# Patient Record
Sex: FEMALE | Race: WHITE | Employment: FULL TIME | URBAN - METROPOLITAN AREA
[De-identification: names, ages, dates, MRNs, and addresses within clinical notes are randomized per-mention and may not be internally consistent; named-entity substitution may affect disease eponyms.]

---

## 2017-01-06 ENCOUNTER — OFFICE VISIT (OUTPATIENT)
Dept: FAMILY MEDICINE CLINIC | Facility: CLINIC | Age: 57
End: 2017-01-06

## 2017-01-06 VITALS
TEMPERATURE: 97 F | SYSTOLIC BLOOD PRESSURE: 104 MMHG | HEART RATE: 85 BPM | HEIGHT: 67 IN | WEIGHT: 218 LBS | DIASTOLIC BLOOD PRESSURE: 67 MMHG | BODY MASS INDEX: 34.21 KG/M2

## 2017-01-06 DIAGNOSIS — G89.29 CHRONIC MIDLINE LOW BACK PAIN WITH LEFT-SIDED SCIATICA: ICD-10-CM

## 2017-01-06 DIAGNOSIS — M54.42 CHRONIC MIDLINE LOW BACK PAIN WITH LEFT-SIDED SCIATICA: ICD-10-CM

## 2017-01-06 DIAGNOSIS — M48.061 FORAMINAL STENOSIS OF LUMBAR REGION: Primary | ICD-10-CM

## 2017-01-06 PROCEDURE — 99212 OFFICE O/P EST SF 10 MIN: CPT | Performed by: FAMILY MEDICINE

## 2017-01-06 PROCEDURE — 99214 OFFICE O/P EST MOD 30 MIN: CPT | Performed by: FAMILY MEDICINE

## 2017-01-10 ENCOUNTER — APPOINTMENT (OUTPATIENT)
Dept: PHYSICAL THERAPY | Age: 57
End: 2017-01-10
Attending: FAMILY MEDICINE
Payer: COMMERCIAL

## 2017-01-12 ENCOUNTER — APPOINTMENT (OUTPATIENT)
Dept: PHYSICAL THERAPY | Age: 57
End: 2017-01-12
Attending: FAMILY MEDICINE
Payer: COMMERCIAL

## 2017-01-13 ENCOUNTER — OFFICE VISIT (OUTPATIENT)
Dept: PHYSICAL THERAPY | Facility: HOSPITAL | Age: 57
End: 2017-01-13
Attending: FAMILY MEDICINE
Payer: COMMERCIAL

## 2017-01-13 DIAGNOSIS — M54.42 CHRONIC MIDLINE LOW BACK PAIN WITH LEFT-SIDED SCIATICA: ICD-10-CM

## 2017-01-13 DIAGNOSIS — M48.061 FORAMINAL STENOSIS OF LUMBAR REGION: Primary | ICD-10-CM

## 2017-01-13 DIAGNOSIS — G89.29 CHRONIC MIDLINE LOW BACK PAIN WITH LEFT-SIDED SCIATICA: ICD-10-CM

## 2017-01-13 PROCEDURE — 97162 PT EVAL MOD COMPLEX 30 MIN: CPT | Performed by: PHYSICAL THERAPIST

## 2017-01-13 PROCEDURE — 97110 THERAPEUTIC EXERCISES: CPT | Performed by: PHYSICAL THERAPIST

## 2017-01-13 NOTE — PROGRESS NOTES
LUMBAR SPINE EVALUATION:   Referring Physician: Dr. Cara Mayberry  Diagnosis: Foraminal stenosis of lumbar region (M99.83)  Chronic midline low back pain with left-sided sciatica (M54.42,G89.29)     Date of Service: 1/13/2017  Visit # 1  Scheduled Visits 6  In 1 UE support  Standing: laterally shifted to the L    ROM:     Trunk           Flexion    75%              Extension 25%   R Sidebend 75%   L Sidebend 75%   R Rotation 100%*   L Rotation 100%   R Sideglide 75%   L Sideglide 75%     Repeated Motion Testing: treatment options and has agreed to actively participate in planning and for this course of care. Thank you for your referral. Please co-sign or sign and return this letter via fax as soon as possible to [phone].  If you have any questions, please c

## 2017-01-16 ENCOUNTER — OFFICE VISIT (OUTPATIENT)
Dept: PHYSICAL THERAPY | Facility: HOSPITAL | Age: 57
End: 2017-01-16
Attending: FAMILY MEDICINE
Payer: COMMERCIAL

## 2017-01-16 ENCOUNTER — APPOINTMENT (OUTPATIENT)
Dept: PHYSICAL THERAPY | Age: 57
End: 2017-01-16
Attending: FAMILY MEDICINE
Payer: COMMERCIAL

## 2017-01-16 PROCEDURE — 97140 MANUAL THERAPY 1/> REGIONS: CPT | Performed by: PHYSICAL THERAPIST

## 2017-01-16 PROCEDURE — 97110 THERAPEUTIC EXERCISES: CPT | Performed by: PHYSICAL THERAPIST

## 2017-01-16 NOTE — PROGRESS NOTES
Foraminal Stenosis of lumbar region  Authorized # of Visits:  9         Next MD visit: none scheduled  Fall Risk: standard         Precautions: n/a           Medication Changes since last visit?: No  Subjective: Pt feels she gets limited relief from HEP, h to be able to walk >20 min in a grocery store  5.  Pt to exhibit increase in lumbar AROM to min loss in all planes for ease of mobility/transfers in order for patient to get into/out of car    Plan: Pt to add PKB, hip ext and hip IR stretching in prone 2x/d

## 2017-01-18 ENCOUNTER — APPOINTMENT (OUTPATIENT)
Dept: PHYSICAL THERAPY | Age: 57
End: 2017-01-18
Attending: FAMILY MEDICINE
Payer: COMMERCIAL

## 2017-01-19 ENCOUNTER — OFFICE VISIT (OUTPATIENT)
Dept: PHYSICAL THERAPY | Facility: HOSPITAL | Age: 57
End: 2017-01-19
Attending: FAMILY MEDICINE
Payer: COMMERCIAL

## 2017-01-19 PROCEDURE — 97110 THERAPEUTIC EXERCISES: CPT

## 2017-01-19 PROCEDURE — 97140 MANUAL THERAPY 1/> REGIONS: CPT

## 2017-01-19 NOTE — PROGRESS NOTES
Foraminal Stenosis of lumbar region  Authorized # of Visits:  3/9         Next MD visit: none scheduled  Fall Risk: standard         Precautions: n/a           Medication Changes since last visit?: No  Subjective: Pt states that she has been in pain since 1. Pt to be independent in their home exercise program, its' progression and management of their symptoms  2. Pt to demo at least 5/5 B LE strength in order to get into/out of car.   3. Pt will exhibit proper body mechanics with floor to waist lifting of

## 2017-01-21 ENCOUNTER — APPOINTMENT (OUTPATIENT)
Dept: PHYSICAL THERAPY | Facility: HOSPITAL | Age: 57
End: 2017-01-21
Attending: FAMILY MEDICINE
Payer: COMMERCIAL

## 2017-01-24 ENCOUNTER — OFFICE VISIT (OUTPATIENT)
Dept: PHYSICAL THERAPY | Facility: HOSPITAL | Age: 57
End: 2017-01-24
Attending: FAMILY MEDICINE
Payer: COMMERCIAL

## 2017-01-24 PROCEDURE — 97110 THERAPEUTIC EXERCISES: CPT

## 2017-01-24 NOTE — PROGRESS NOTES
Foraminal Stenosis of lumbar region  Authorized # of Visits:  4/9         Next MD visit: none scheduled  Fall Risk: standard         Precautions: n/a           Medication Changes since last visit?: No  Subjective: Pt states that she was feeling really good mechanics for golfer's and squat lifts x 6 min         Standing hip ext with red TB x 15 r/l         Mini squats with abd bracing x 15            Assessment: Pt reporting relief with flexion based program.  Pt has decreased postural awareness and requires

## 2017-01-25 ENCOUNTER — APPOINTMENT (OUTPATIENT)
Dept: PHYSICAL THERAPY | Age: 57
End: 2017-01-25
Attending: FAMILY MEDICINE
Payer: COMMERCIAL

## 2017-01-26 ENCOUNTER — OFFICE VISIT (OUTPATIENT)
Dept: PHYSICAL THERAPY | Facility: HOSPITAL | Age: 57
End: 2017-01-26
Attending: FAMILY MEDICINE
Payer: COMMERCIAL

## 2017-01-26 PROCEDURE — 97110 THERAPEUTIC EXERCISES: CPT | Performed by: PHYSICAL THERAPIST

## 2017-01-26 NOTE — PROGRESS NOTES
Foraminal Stenosis of lumbar region  Authorized # of Visits:  5/9         Next MD visit: none scheduled  Fall Risk: standard         Precautions: n/a           Medication Changes since last visit?: No  Subjective: Pt states that she continues to be in pain home exercise program, its' progression and management of their symptoms  2. Pt to demo at least 5/5 B LE strength in order to get into/out of car.   3. Pt will exhibit proper body mechanics with floor to waist lifting of 10# in order to lift laundry basket

## 2017-01-27 ENCOUNTER — APPOINTMENT (OUTPATIENT)
Dept: PHYSICAL THERAPY | Age: 57
End: 2017-01-27
Attending: FAMILY MEDICINE
Payer: COMMERCIAL

## 2017-01-31 ENCOUNTER — OFFICE VISIT (OUTPATIENT)
Dept: PHYSICAL THERAPY | Facility: HOSPITAL | Age: 57
End: 2017-01-31
Attending: FAMILY MEDICINE
Payer: COMMERCIAL

## 2017-01-31 PROCEDURE — 97110 THERAPEUTIC EXERCISES: CPT | Performed by: PHYSICAL THERAPIST

## 2017-01-31 NOTE — PROGRESS NOTES
Foraminal Stenosis of lumbar region  Authorized # of Visits:  6/9         Next MD visit: none scheduled  Fall Risk: standard         Precautions: n/a           Medication Changes since last visit?: No  Subjective: Pt states that she was in a lot pain this squats x20         Assessment: Added B LE strengthening exercises        Plan: Pt to continue with flexion based program and core strengthening        Charges: 3 TE,      Total Timed Treatment: 40 min  Total Treatment Time: 40 min

## 2017-02-02 ENCOUNTER — OFFICE VISIT (OUTPATIENT)
Dept: PHYSICAL THERAPY | Facility: HOSPITAL | Age: 57
End: 2017-02-02
Attending: FAMILY MEDICINE
Payer: COMMERCIAL

## 2017-02-02 PROCEDURE — 97110 THERAPEUTIC EXERCISES: CPT | Performed by: PHYSICAL THERAPIST

## 2017-02-02 NOTE — PROGRESS NOTES
Foraminal Stenosis of lumbar region  Authorized # of Visits:  7/9         Next MD visit: none scheduled  Fall Risk: standard         Precautions: n/a           Medication Changes since last visit?: No  Subjective: Pt states she was sore after last visit. with abd bracing x 15   Mini squats x20         Assessment: Performed ext based exercises after pt stood up she had step too gait and no change in symptoms.  . Recommended pt to make an appointment with the pain center        Plan: Reassessed pt        Meredith

## 2017-02-08 ENCOUNTER — OFFICE VISIT (OUTPATIENT)
Dept: PHYSICAL THERAPY | Facility: HOSPITAL | Age: 57
End: 2017-02-08
Attending: FAMILY MEDICINE
Payer: COMMERCIAL

## 2017-02-08 PROCEDURE — 97110 THERAPEUTIC EXERCISES: CPT

## 2017-02-08 NOTE — PROGRESS NOTES
Foraminal Stenosis of lumbar region  Authorized # of Visits:  8/9         Next MD visit: none scheduled  Fall Risk: standard         Precautions: n/a           Medication Changes since last visit?: No  Subjective: Pt states that she wants to keep it light. (partial)   2 x 10    Repeated seated hip flexion 2x10 - decrease ant thigh/groin pain with walking - better.  DKTC 10\" x 5-better Body mechanics for golfer's and squat lifts x 6 min  Hamstring stretch 30sec x3  Prone quads 30sec x3 Prone quad stretch 30 s

## 2017-02-14 NOTE — PROGRESS NOTES
Patient Name: Riley Schultz, : 6/15/1960, MRN: [id number]   Date:  2017  Referring Physician:  Trey Sterling    Diagnosis: Foraminal Stenosis of lumbar region    Discharge Summary    Pt has attended 8, cancelled 1, and no shown 0 visits in

## 2017-02-15 ENCOUNTER — APPOINTMENT (OUTPATIENT)
Dept: PHYSICAL THERAPY | Facility: HOSPITAL | Age: 57
End: 2017-02-15
Attending: FAMILY MEDICINE
Payer: COMMERCIAL

## 2017-02-16 ENCOUNTER — OFFICE VISIT (OUTPATIENT)
Dept: PAIN CLINIC | Facility: HOSPITAL | Age: 57
End: 2017-02-16
Attending: ANESTHESIOLOGY
Payer: COMMERCIAL

## 2017-02-16 VITALS
WEIGHT: 200 LBS | HEART RATE: 84 BPM | HEIGHT: 67 IN | RESPIRATION RATE: 18 BRPM | SYSTOLIC BLOOD PRESSURE: 148 MMHG | BODY MASS INDEX: 31.39 KG/M2 | DIASTOLIC BLOOD PRESSURE: 98 MMHG

## 2017-02-16 DIAGNOSIS — M48.061 FORAMINAL STENOSIS OF LUMBAR REGION: Primary | ICD-10-CM

## 2017-02-16 DIAGNOSIS — M51.16 LUMBAR DISC DISEASE WITH RADICULOPATHY: ICD-10-CM

## 2017-02-16 PROCEDURE — 99201 HC OUTPT EVAL AND MGNT NEW PT LEVEL 1: CPT

## 2017-02-16 NOTE — CHRONIC PAIN
Initial Consultation Note      HISTORY OF PRESENT ILLNESS:  Clay Perez is a 49year old old female referred to the pain clinic by Dr. Cara Mayberry for lower back pain. The pain radiates into the left lower extremity. No specific inciting event recalled.  No hypertension    • Pilonidal cyst    • Dermatological disorder      per [employer]:  \"removal of cyst under the chin\"   • Dermatological disorder      per [employer]:  \"removal of cyst under left arm\"   • Back problem        FAMILY HISTORY:  Family History Foot EHL 5/5 5/5   Gastrocnemius 5/5 5/5     SLR: positive on left, negative on right  SIJ tenderness negative  Дмитрий's test: negative  Joint Exam: Normal  TPs:  Absent within lumbo-sacral paraspinal muscles    Right Deep tendon reflexes: 2/4   Left Deep foraminal narrowing. L4-L5:   There is a broad left lateralized disc bulge. No significant central stenosis. There is facet arthropathy. There is moderate to severe left, and mild right foraminal narrowing. L5-S1:  Mild disc bulge.  No significant central paralysis, death. The patient desires the proposed neuraxial anesthetic as planned. Patient to return for follow up in 3 weeks, sooner if warranted.

## 2017-02-16 NOTE — PROGRESS NOTES
WORKS IN OFFICE  GOOD SUPPORT SYSTEM    02/16/17  PRESENTS AMBULATORY TO CPM;  NEW CONSULT C/O LLBP WITH LT THIGH RADICULAR PAIN;  MRI INDICATIONS:  Lower back pain that radiates down left leg for 1 year. No injury.  Sciatica despite greater than 6

## 2017-02-23 ENCOUNTER — TELEPHONE (OUTPATIENT)
Dept: FAMILY MEDICINE CLINIC | Facility: CLINIC | Age: 57
End: 2017-02-23

## 2017-02-23 NOTE — TELEPHONE ENCOUNTER
Per pt, she is having pain on her shoulder blade, chest congestion and sometime pt is catching her breath and don't know if she has URI or sinus. Pt would like to know if VS can see her today. No available appt to any drs.    Pt would like to know what to

## 2017-02-23 NOTE — TELEPHONE ENCOUNTER
Reason for Call/Chief Complaint: Patient states has been having neck pain that travels to shoulder blade and possible Upper Respiratory Infection.    Onset: Tuesday  Nursing Assessment/Associated Symptoms: States woke up on Tuesday with neck pain, shoulder

## 2017-02-24 ENCOUNTER — OFFICE VISIT (OUTPATIENT)
Dept: FAMILY MEDICINE CLINIC | Facility: CLINIC | Age: 57
End: 2017-02-24

## 2017-02-24 VITALS
HEIGHT: 67 IN | BODY MASS INDEX: 34.06 KG/M2 | DIASTOLIC BLOOD PRESSURE: 85 MMHG | SYSTOLIC BLOOD PRESSURE: 118 MMHG | HEART RATE: 77 BPM | TEMPERATURE: 97 F | WEIGHT: 217 LBS

## 2017-02-24 DIAGNOSIS — F43.9 EMOTIONAL STRESS REACTION: ICD-10-CM

## 2017-02-24 DIAGNOSIS — S46.819A TRAPEZIUS STRAIN, UNSPECIFIED LATERALITY, INITIAL ENCOUNTER: ICD-10-CM

## 2017-02-24 DIAGNOSIS — S16.1XXA NECK STRAIN, INITIAL ENCOUNTER: Primary | ICD-10-CM

## 2017-02-24 DIAGNOSIS — R07.89 CHEST WALL PAIN: ICD-10-CM

## 2017-02-24 PROCEDURE — 99212 OFFICE O/P EST SF 10 MIN: CPT | Performed by: FAMILY MEDICINE

## 2017-02-24 PROCEDURE — 99214 OFFICE O/P EST MOD 30 MIN: CPT | Performed by: FAMILY MEDICINE

## 2017-02-24 RX ORDER — METHOCARBAMOL 750 MG/1
750 TABLET, FILM COATED ORAL 3 TIMES DAILY
Qty: 40 TABLET | Refills: 0 | Status: SHIPPED | OUTPATIENT
Start: 2017-02-24 | End: 2017-03-06

## 2017-02-24 NOTE — PROGRESS NOTES
Patient ID: Brendan Villegas is a 49year old female. HPI  Patient presents with:  Back Pain: upper back with shouler and neck  Chest Pain    On 2/21/2017 she was getting ready for work and sat down.   She stood up and felt immediate pain in the trapezius bilaterally without step-off or crepitus. Neurological: She is alert and oriented to person, place, and time. She has normal reflexes. Skin: Skin is warm and dry. Psychiatric: Her mood appears anxious. Nursing note and vitals reviewed.     Blood pre understands and agrees to plan.            Trey Sterling, DO  2/24/2017

## 2017-03-08 ENCOUNTER — TELEPHONE (OUTPATIENT)
Dept: PAIN CLINIC | Facility: HOSPITAL | Age: 57
End: 2017-03-08

## 2017-03-09 ENCOUNTER — TELEPHONE (OUTPATIENT)
Dept: PAIN CLINIC | Facility: HOSPITAL | Age: 57
End: 2017-03-09

## 2017-03-09 NOTE — TELEPHONE ENCOUNTER
Pt was instructed to return for follow up. Does she have an upcoming appt?  If not please schedule one, pt has private insurance and requires a PA

## 2017-03-10 ENCOUNTER — OFFICE VISIT (OUTPATIENT)
Dept: PAIN CLINIC | Facility: HOSPITAL | Age: 57
End: 2017-03-10
Attending: NURSE PRACTITIONER
Payer: COMMERCIAL

## 2017-03-10 VITALS
HEIGHT: 67 IN | BODY MASS INDEX: 31.39 KG/M2 | SYSTOLIC BLOOD PRESSURE: 115 MMHG | HEART RATE: 77 BPM | WEIGHT: 200 LBS | DIASTOLIC BLOOD PRESSURE: 77 MMHG

## 2017-03-10 DIAGNOSIS — M54.42 CHRONIC MIDLINE LOW BACK PAIN WITH LEFT-SIDED SCIATICA: ICD-10-CM

## 2017-03-10 DIAGNOSIS — G89.29 CHRONIC MIDLINE LOW BACK PAIN WITH LEFT-SIDED SCIATICA: ICD-10-CM

## 2017-03-10 DIAGNOSIS — M48.07 SPINAL STENOSIS OF LUMBOSACRAL REGION: Primary | ICD-10-CM

## 2017-03-10 PROBLEM — M54.50 LUMBAGO: Status: ACTIVE | Noted: 2017-03-10

## 2017-03-10 PROBLEM — M48.00 SPINAL STENOSIS: Status: ACTIVE | Noted: 2017-03-10

## 2017-03-10 PROCEDURE — 99211 OFF/OP EST MAY X REQ PHY/QHP: CPT

## 2017-03-10 NOTE — CHRONIC PAIN
Follow-up Note    HISTORY OF PRESENT ILLNESS:  Esteban Benton is a 49year old old female, originally referred to the pain clinic by Michoacano Owens, returns to the clinic for follow up.   Patient reports COLLIN provided 100% relief of her low back pain and 50% reli essential hypertension    • Pilonidal cyst    • Dermatological disorder      per [employer]:  \"removal of cyst under the chin\"   • Dermatological disorder      per [employer]:  \"removal of cyst under left arm\"   • Back problem        SURGICAL HISTORY:      P RRR, no m/r/g  Pulmonary: CTAB   Abdomen: soft, non-tender  Gait: Normal;  cane user - No  Spine: Scoliosis    ROM:   LUMBAR SPINE    Degree Pain   Flexion 90 No   Extension 30 No                         MOTOR EXAMINATION:  LOWER EXTREMITY      LEFT RIGHT

## 2017-04-11 ENCOUNTER — HOSPITAL ENCOUNTER (OUTPATIENT)
Dept: GENERAL RADIOLOGY | Age: 57
Discharge: HOME | End: 2017-04-11
Attending: FAMILY MEDICINE
Payer: COMMERCIAL

## 2017-04-11 ENCOUNTER — OFFICE VISIT (OUTPATIENT)
Dept: FAMILY MEDICINE CLINIC | Facility: CLINIC | Age: 57
End: 2017-04-11

## 2017-04-11 VITALS
SYSTOLIC BLOOD PRESSURE: 124 MMHG | TEMPERATURE: 97 F | HEIGHT: 67 IN | DIASTOLIC BLOOD PRESSURE: 85 MMHG | HEART RATE: 89 BPM | WEIGHT: 215 LBS | BODY MASS INDEX: 33.74 KG/M2

## 2017-04-11 DIAGNOSIS — M16.12 PRIMARY OSTEOARTHRITIS OF LEFT HIP: ICD-10-CM

## 2017-04-11 DIAGNOSIS — M48.07 SPINAL STENOSIS OF LUMBOSACRAL REGION: ICD-10-CM

## 2017-04-11 DIAGNOSIS — R05.9 COUGH: ICD-10-CM

## 2017-04-11 DIAGNOSIS — M79.652 LEFT THIGH PAIN: ICD-10-CM

## 2017-04-11 DIAGNOSIS — R05.9 COUGH: Primary | ICD-10-CM

## 2017-04-11 PROCEDURE — 99214 OFFICE O/P EST MOD 30 MIN: CPT | Performed by: FAMILY MEDICINE

## 2017-04-11 PROCEDURE — 71020 XR CHEST PA + LAT CHEST (CPT=71020): CPT

## 2017-04-11 PROCEDURE — 99212 OFFICE O/P EST SF 10 MIN: CPT | Performed by: FAMILY MEDICINE

## 2017-04-11 RX ORDER — BENZONATATE 200 MG/1
200 CAPSULE ORAL 3 TIMES DAILY PRN
Qty: 30 CAPSULE | Refills: 0 | Status: SHIPPED | OUTPATIENT
Start: 2017-04-11 | End: 2017-04-21

## 2017-04-11 RX ORDER — AZITHROMYCIN 250 MG/1
TABLET, FILM COATED ORAL
Qty: 6 TABLET | Refills: 0 | Status: SHIPPED | OUTPATIENT
Start: 2017-04-11 | End: 2017-04-16

## 2017-04-11 NOTE — PROGRESS NOTES
Patient ID: Cindy Black is a 49year old female. HPI  Patient presents with:  Cough    She states for her back she had 2 epidural steroid injections. The last one was on March 20, 2017. She states she still gets pain in the left thigh.   She did ha laporoscopy    OTHER SURGICAL HISTORY      Comment amniocentesis    ELECTROCARDIOGRAM, COMPLETE  06-    Comment Scanned to Media Tab - Date of Service 06-            Current Outpatient Prescriptions:  Losartan Potassium-HCTZ 100-25 MG Oral Ta Tessalon Perles. She defers anything with codeine in it. We will go ahead and do a chest x-ray on her. Spinal stenosis of lumbosacral region  She had 2 epidural steroid injections.   I wonder if the pain is actually coming from the left hip we will go ah

## 2017-04-12 ENCOUNTER — TELEPHONE (OUTPATIENT)
Dept: FAMILY MEDICINE CLINIC | Facility: CLINIC | Age: 57
End: 2017-04-12

## 2017-04-12 NOTE — TELEPHONE ENCOUNTER
----- Message from Yanna Leal DO sent at 4/11/2017  5:40 PM CDT -----  Chest x-ray shows no pneumonia.

## 2017-04-19 ENCOUNTER — TELEPHONE (OUTPATIENT)
Dept: FAMILY MEDICINE CLINIC | Facility: CLINIC | Age: 57
End: 2017-04-19

## 2017-04-19 DIAGNOSIS — M79.652 LEFT THIGH PAIN: ICD-10-CM

## 2017-04-19 DIAGNOSIS — M16.10 ARTHRITIS OF HIP: ICD-10-CM

## 2017-04-19 DIAGNOSIS — M25.552 LEFT HIP PAIN: Primary | ICD-10-CM

## 2017-04-19 NOTE — TELEPHONE ENCOUNTER
Dr. Renie Closs,    Please enter referral for xr aspir/inj major joint w/ fluoro. Also patient requesting return to work letter, letter written please review. Thanks(patient will call us back with work fax num    Manage care pt would like you to follow up with her on daily basis until     Pt had a 10am appointment at [facility] for her xr aspir/inj major joint w/ fluoro LT (hip injection for her osteoarthritis) order entered by Dr. Renie Closs on 4/11/17. Approval for the procedure was not verified from the insurance company. I attempt to get prior authorization for patient today however that was not possible because the order needs a referral itself.

## 2017-04-19 NOTE — TELEPHONE ENCOUNTER
Please call patient asap, she is having a procedure in radiology in 10min.  Was told by her insur that they have no inform on this, she is asking was this approved

## 2017-04-19 NOTE — TELEPHONE ENCOUNTER
Spoke to pt, informed her that according to [employer], a referral is needed for the procedure. Dr. Shahnaz Márquez will sign the referral and send it to [facility] for approval, it will be sent to the insurance company, however, the procedure will have to be rescheduled. Pt is very frustrated but will await approval so that procedure can be rescheduled.

## 2017-04-19 NOTE — TELEPHONE ENCOUNTER
Spoke to pt, she is awaiting a hip injection under flouroscopy. Approval for the procedure was not verifed from [employer]. Spoke to the office, they are currently on the phone awaiting approval for the procedure. Called pt back, left VM that the process is ongoing, the office staff will call her when they have a response.

## 2017-04-21 ENCOUNTER — TELEPHONE (OUTPATIENT)
Dept: FAMILY MEDICINE CLINIC | Facility: CLINIC | Age: 57
End: 2017-04-21

## 2017-04-21 NOTE — TELEPHONE ENCOUNTER
Patient called this morning upset that she has not received daily calls as promised with updates on her request. Referral necessary for approval was coded under DME instead of the correct coding needed for the procedure. Spoke with [facility] (ext [phone]) and was informed that they have not processed request d/t not being placed urgently. Also, message requesting daily calls to patient was never routed to [facility]. Patient requires referral for procedure. Please contact patient with any updates/questions/concerns.

## 2017-04-21 NOTE — TELEPHONE ENCOUNTER
Spoke with patient informed ref was corrected and sent to [facility]. I did also follow up with [facility] to ensure it was rec.

## 2017-04-21 NOTE — TELEPHONE ENCOUNTER
Spoke with Altagracia Rhodes. Advised that I was unable to give her name of person who would follow-up with her regarding the status of this request. BRUNA stated she should call back on Monday to get contact information for person in managed care who could assist her.

## 2017-05-19 ENCOUNTER — HOSPITAL ENCOUNTER (OUTPATIENT)
Dept: GENERAL RADIOLOGY | Facility: HOSPITAL | Age: 57
Discharge: HOME | End: 2017-05-19
Attending: FAMILY MEDICINE
Payer: COMMERCIAL

## 2017-05-19 DIAGNOSIS — M16.10 ARTHRITIS OF HIP: ICD-10-CM

## 2017-05-19 DIAGNOSIS — M25.552 LEFT HIP PAIN: ICD-10-CM

## 2017-05-19 DIAGNOSIS — M79.652 LEFT THIGH PAIN: ICD-10-CM

## 2017-05-19 PROCEDURE — 20610 DRAIN/INJ JOINT/BURSA W/O US: CPT | Performed by: FAMILY MEDICINE

## 2017-05-19 PROCEDURE — 77002 NEEDLE LOCALIZATION BY XRAY: CPT

## 2017-05-19 RX ORDER — METHYLPREDNISOLONE ACETATE 80 MG/ML
80 INJECTION, SUSPENSION INTRA-ARTICULAR; INTRALESIONAL; INTRAMUSCULAR; SOFT TISSUE ONCE
Status: DISCONTINUED | OUTPATIENT
Start: 2017-05-19 | End: 2017-05-23

## 2017-05-19 RX ORDER — LIDOCAINE HYDROCHLORIDE 10 MG/ML
5 INJECTION, SOLUTION EPIDURAL; INFILTRATION; INTRACAUDAL; PERINEURAL ONCE
Status: DISCONTINUED | OUTPATIENT
Start: 2017-05-19 | End: 2017-05-23

## 2017-05-19 RX ORDER — LIDOCAINE HYDROCHLORIDE 10 MG/ML
INJECTION, SOLUTION EPIDURAL; INFILTRATION; INTRACAUDAL; PERINEURAL
Status: DISPENSED
Start: 2017-05-19 | End: 2017-05-19

## 2017-05-19 RX ORDER — METHYLPREDNISOLONE ACETATE 40 MG/ML
INJECTION, SUSPENSION INTRA-ARTICULAR; INTRALESIONAL; INTRAMUSCULAR; SOFT TISSUE
Status: DISCONTINUED
Start: 2017-05-19 | End: 2017-05-19

## 2017-06-28 ENCOUNTER — TELEPHONE (OUTPATIENT)
Dept: INTERNAL MEDICINE CLINIC | Facility: CLINIC | Age: 57
End: 2017-06-28

## 2017-06-28 NOTE — TELEPHONE ENCOUNTER
Actions Requested: Pt requesting Rx for Z Ross  Situation/Background   Problem: dry cough and chest congestion   Onset: 1 week   Associated Symptoms: Pt stts has dry cough and chest congestion. Denies fever,body aches or nasal congestion.     History of Same Fever > 100.5 F (38.1 C) and bedridden (e.g., nursing home patient, stroke, chronic illness, recovering from surgery)  * Increasing ankle swelling  * Wheezing is present  * Severe coughing spells (e.g., whooping sound after coughing, vomiting after coughin

## 2017-06-29 ENCOUNTER — OFFICE VISIT (OUTPATIENT)
Dept: FAMILY MEDICINE CLINIC | Facility: CLINIC | Age: 57
End: 2017-06-29

## 2017-06-29 VITALS
WEIGHT: 214.63 LBS | DIASTOLIC BLOOD PRESSURE: 88 MMHG | HEIGHT: 67 IN | TEMPERATURE: 98 F | BODY MASS INDEX: 33.69 KG/M2 | HEART RATE: 88 BPM | RESPIRATION RATE: 16 BRPM | SYSTOLIC BLOOD PRESSURE: 123 MMHG

## 2017-06-29 DIAGNOSIS — M48.07 SPINAL STENOSIS OF LUMBOSACRAL REGION: ICD-10-CM

## 2017-06-29 DIAGNOSIS — M79.652 PAIN IN LEFT THIGH: ICD-10-CM

## 2017-06-29 DIAGNOSIS — J32.9 SINUSITIS, UNSPECIFIED CHRONICITY, UNSPECIFIED LOCATION: Primary | ICD-10-CM

## 2017-06-29 DIAGNOSIS — R09.82 POST-NASAL DRIP: ICD-10-CM

## 2017-06-29 PROCEDURE — 99212 OFFICE O/P EST SF 10 MIN: CPT | Performed by: FAMILY MEDICINE

## 2017-06-29 PROCEDURE — 99214 OFFICE O/P EST MOD 30 MIN: CPT | Performed by: FAMILY MEDICINE

## 2017-06-29 RX ORDER — FLUTICASONE PROPIONATE 50 MCG
2 SPRAY, SUSPENSION (ML) NASAL DAILY
Qty: 1 BOTTLE | Refills: 3 | Status: SHIPPED | OUTPATIENT
Start: 2017-06-29 | End: 2017-10-27

## 2017-06-29 RX ORDER — AMOXICILLIN AND CLAVULANATE POTASSIUM 875; 125 MG/1; MG/1
1 TABLET, FILM COATED ORAL 2 TIMES DAILY
Qty: 20 TABLET | Refills: 0 | Status: SHIPPED | OUTPATIENT
Start: 2017-06-29 | End: 2017-07-09

## 2017-06-29 NOTE — PROGRESS NOTES
Patient ID: Mariah Peace is a 56year old female. Actions Requested: Pt requesting Rx for Z Ross  Situation/Background   Problem: dry cough and chest congestion   Onset: 1 week   Associated Symptoms: Pt stts has dry cough and chest congestion.  Denies : 200 lb (90.7 kg)  01/06/17 : 218 lb (98.9 kg)      Body mass index is 33.61 kg/m².     BP Readings from Last 6 Encounters:  06/29/17 : 123/88  04/11/17 : 124/85  03/10/17 : 115/77  02/24/17 : 118/85  02/16/17 : 148/98  01/06/17 : 104/67        Review of S rhinorrhea. Turbinates: moderate congestion. Oropharynx: clear post nasal drainage without cobblestoning. Tonsils: normal   Eyes: Conjunctivae and EOM are normal.   Neck: Neck supple. No thyromegaly present.    Cardiovascular: Normal rate, regular rhythm

## 2017-07-06 ENCOUNTER — TELEPHONE (OUTPATIENT)
Dept: INTERNAL MEDICINE CLINIC | Facility: CLINIC | Age: 57
End: 2017-07-06

## 2017-07-06 RX ORDER — AZITHROMYCIN 250 MG/1
TABLET, FILM COATED ORAL
Qty: 6 TABLET | Refills: 0 | Status: SHIPPED | OUTPATIENT
Start: 2017-07-06 | End: 2017-07-13

## 2017-07-06 NOTE — TELEPHONE ENCOUNTER
KASSIDY-Ross sent in but if no better in 1 week see me. I would have expected the antibiotic to work. I again suspect this will be allergy related.

## 2017-07-06 NOTE — TELEPHONE ENCOUNTER
Patient was seen a week ago for a cough and cold. Was given Augmentin and Flonase. She stated she is not any better. She continues to cough up clear phlegm and has constant phlegm in her throat.   She only took the Flonase once and did not like the

## 2017-07-11 ENCOUNTER — TELEPHONE (OUTPATIENT)
Dept: INTERNAL MEDICINE CLINIC | Facility: CLINIC | Age: 57
End: 2017-07-11

## 2017-07-11 NOTE — TELEPHONE ENCOUNTER
LOV 6/29/17and given augementin took for 7 days and did not help. Switched to zpack and completed today. Continues to cough inconsistently and at times clear productive congested. Requesting advise or other meds/ appt for F/U?

## 2017-07-12 NOTE — TELEPHONE ENCOUNTER
See me either Thursday or Friday. Like I thought this must be allergy related. I just do not think it is infectious.

## 2017-07-13 ENCOUNTER — HOSPITAL ENCOUNTER (OUTPATIENT)
Dept: GENERAL RADIOLOGY | Age: 57
Discharge: HOME | End: 2017-07-13
Attending: FAMILY MEDICINE
Payer: COMMERCIAL

## 2017-07-13 ENCOUNTER — OFFICE VISIT (OUTPATIENT)
Dept: FAMILY MEDICINE CLINIC | Facility: CLINIC | Age: 57
End: 2017-07-13

## 2017-07-13 VITALS
BODY MASS INDEX: 33 KG/M2 | WEIGHT: 213 LBS | DIASTOLIC BLOOD PRESSURE: 77 MMHG | TEMPERATURE: 98 F | SYSTOLIC BLOOD PRESSURE: 116 MMHG | HEART RATE: 94 BPM

## 2017-07-13 DIAGNOSIS — R05.9 COUGH: Primary | ICD-10-CM

## 2017-07-13 DIAGNOSIS — R09.89 CHEST CONGESTION: ICD-10-CM

## 2017-07-13 DIAGNOSIS — R05.9 COUGH: ICD-10-CM

## 2017-07-13 PROCEDURE — 99212 OFFICE O/P EST SF 10 MIN: CPT | Performed by: FAMILY MEDICINE

## 2017-07-13 PROCEDURE — 99214 OFFICE O/P EST MOD 30 MIN: CPT | Performed by: FAMILY MEDICINE

## 2017-07-13 PROCEDURE — 71020 XR CHEST PA + LAT CHEST (CPT=71020): CPT | Performed by: FAMILY MEDICINE

## 2017-07-13 RX ORDER — BUDESONIDE AND FORMOTEROL FUMARATE DIHYDRATE 160; 4.5 UG/1; UG/1
2 AEROSOL RESPIRATORY (INHALATION) 2 TIMES DAILY
Qty: 1 INHALER | Refills: 3 | Status: SHIPPED | OUTPATIENT
Start: 2017-07-13 | End: 2017-12-07 | Stop reason: ALTCHOICE

## 2017-07-13 RX ORDER — MONTELUKAST SODIUM 10 MG/1
10 TABLET ORAL DAILY
Qty: 30 TABLET | Refills: 3 | Status: SHIPPED | OUTPATIENT
Start: 2017-07-13 | End: 2018-01-09

## 2017-07-13 NOTE — PROGRESS NOTES
Patient ID: Aruna Melton is a 56year old female. HPI  Patient presents with:  Cough  Chest Congestion    I had her on Augmentin and that we put on Zithromax but she is back due to cough and chest congestion. She still is coughing.   She states it is TONSILLECTOMY       Current Outpatient Prescriptions:  Fluticasone Propionate 50 MCG/ACT Nasal Suspension 2 sprays by Nasal route daily. Disp: 1 Bottle Rfl: 3   Losartan Potassium-HCTZ 100-25 MG Oral Tab Take 1 tablet by mouth once daily.  Disp: 90 tablet R daily and I also want her to see the allergist.  Chest congestion  -     ALLERGY - INTERNAL  -     XR CHEST PA + LAT CHEST (CPT=71020); Future  -     Montelukast Sodium (SINGULAIR) 10 MG Oral Tab; Take 1 tablet (10 mg total) by mouth daily.     Other orders

## 2017-07-15 ENCOUNTER — TELEPHONE (OUTPATIENT)
Dept: FAMILY MEDICINE CLINIC | Facility: CLINIC | Age: 57
End: 2017-07-15

## 2017-09-12 ENCOUNTER — NURSE TRIAGE (OUTPATIENT)
Dept: FAMILY MEDICINE CLINIC | Facility: CLINIC | Age: 57
End: 2017-09-12

## 2017-09-12 NOTE — TELEPHONE ENCOUNTER
Action Requested: Summary for Provider     []  Critical Lab, Recommendations Needed  [x] Need Additional Advice  []   FYI    []   Need Orders  [] Need Medications Sent to Pharmacy  []  Other     SUMMARY: NEEDS IR REFERRAL, left hip pain  Pt states she has

## 2017-09-13 ENCOUNTER — TELEPHONE (OUTPATIENT)
Dept: FAMILY MEDICINE CLINIC | Facility: CLINIC | Age: 57
End: 2017-09-13

## 2017-09-13 DIAGNOSIS — M16.10 DEGENERATIVE JOINT DISEASE OF PELVIC REGION: ICD-10-CM

## 2017-09-13 DIAGNOSIS — M79.652 LEFT THIGH PAIN: ICD-10-CM

## 2017-09-13 DIAGNOSIS — M25.552 LEFT HIP PAIN: Primary | ICD-10-CM

## 2017-09-13 NOTE — TELEPHONE ENCOUNTER
Abigail Caceres, let us hold off on getting this approved because it only helped for 3 weeks. Her pain may be coming from her hip. I want her to see Dr. Stuart Mueller, the physiatrist or 1 of her partners as I asked her sometime ago.   I think 1 of our nurses already discu

## 2017-09-13 NOTE — TELEPHONE ENCOUNTER
Dr. Frankie Gonsales,    Pt called [facility] dept requesting an urgent referral for a hip injection. Per pt had previous injection in May. Please advise and sign off on referral.      Thank you, [facility].

## 2017-09-13 NOTE — TELEPHONE ENCOUNTER
Did she have her see Dr. Babita Cardenas, the physiatrist?  Considering that the shot only lasted 3 weeks, that may mean that there may be a different reason for her pain such as the pain coming from the back instead.   That is why we did the MRI of her low back and sh

## 2017-09-13 NOTE — TELEPHONE ENCOUNTER
Spoke with pt she will call Dr. Donovan Taylor office for an appointment. Thank you, [facility].

## 2017-09-14 NOTE — TELEPHONE ENCOUNTER
Hi Dr. Zulay Bernstein,     The patient would like to know if you are ok if she sees Dr Jasmyne Yoder partner Dr. Burke Avila one of her partners as it was a 3 week wait to see Dr. Alyssa Gomez. Per your note below I advised this would be ok.   I have only heard of Dr. Alyssa Gomez and Dr. Quintanilla Fruits

## 2017-09-18 ENCOUNTER — TELEPHONE (OUTPATIENT)
Dept: NEUROLOGY | Facility: CLINIC | Age: 57
End: 2017-09-18

## 2017-09-19 ENCOUNTER — OFFICE VISIT (OUTPATIENT)
Dept: NEUROLOGY | Facility: CLINIC | Age: 57
End: 2017-09-19

## 2017-09-19 VITALS
DIASTOLIC BLOOD PRESSURE: 84 MMHG | HEIGHT: 66 IN | BODY MASS INDEX: 34.23 KG/M2 | SYSTOLIC BLOOD PRESSURE: 126 MMHG | HEART RATE: 80 BPM | RESPIRATION RATE: 16 BRPM | WEIGHT: 213 LBS

## 2017-09-19 DIAGNOSIS — M16.12 OSTEOARTHRITIS OF LEFT HIP, UNSPECIFIED OSTEOARTHRITIS TYPE: Primary | ICD-10-CM

## 2017-09-19 PROCEDURE — 99204 OFFICE O/P NEW MOD 45 MIN: CPT | Performed by: PHYSICAL MEDICINE & REHABILITATION

## 2017-09-19 RX ORDER — METHYLPREDNISOLONE 4 MG/1
TABLET ORAL
Qty: 1 PACKAGE | Refills: 0 | Status: SHIPPED | OUTPATIENT
Start: 2017-09-19 | End: 2017-10-10 | Stop reason: ALTCHOICE

## 2017-09-19 NOTE — H&P
Larry Mott DO  [address]  [address]  [place] [address]  Rosaline Saucedo DO    PHYSICAL MEDICINE and REHABILITATION NEW PATIENT    Chief Complaint: left hip pain    HPI: Koby Peña is a 56year old female who presents with a chief complaint below the knee   Numbness/Tingling:  No numbness or tingling   Weakness:  None   Worsening factors: Standing aggravates the low back. Walking aggravates both the left hip and the back; this affects the leg far more than the back.  No pain with sitting or pr Rfl: 3   Losartan Potassium-HCTZ 100-25 MG Oral Tab Take 1 tablet by mouth once daily.  Disp: 90 tablet Rfl: 2     No Known Allergies    Social History  Social History   Marital status:   Spouse name: N/A    Years of education: N/A  Number of childre Examination  Ambulation: Normal  Observation: No obvious atrophy in the lower extremity muscles  Lumbar range of motion: Full lumbar flexion and extension without pain   Hip range of motion: Mild pain reported on internal and external rotation of the left There is a mild broad disc bulge. There is effacement of the ventral thecal sac without significant central stenosis. There is facet arthropathy and mild dorsal epidural lipomatosis. There is mild left and moderate to severe right foraminal   narrowing.   L PM  9/19/2017

## 2017-09-19 NOTE — PATIENT INSTRUCTIONS
1) Take the medrol dosepak as directed. 2) If you continue to have pain then we can consider a hip injection. 3) Follow up in 3 weeks as needed.     As of October 6th 2014, the Drug Enforcement Agency [facility]) is reclassifying all hydrocodone combinatio individual in advance and they must present an ID as well. · The name of the person picking up your prescription must be documented in your chart.

## 2017-10-04 DIAGNOSIS — I10 ESSENTIAL HYPERTENSION, BENIGN: ICD-10-CM

## 2017-10-06 RX ORDER — LOSARTAN POTASSIUM AND HYDROCHLOROTHIAZIDE 25; 100 MG/1; MG/1
TABLET ORAL
Qty: 90 TABLET | Refills: 0 | Status: SHIPPED | OUTPATIENT
Start: 2017-10-06 | End: 2018-01-03

## 2017-10-06 NOTE — TELEPHONE ENCOUNTER
Refill fails protocol - please advise.     Pt states she knows she has labs drawn more recently than 2015

## 2017-10-06 NOTE — TELEPHONE ENCOUNTER
Hypertensive Medications  Protocol Criteria:  · Appointment scheduled in the past 6 months or in the next 3 months  · BMP or CMP in the past 12 months  · Creatinine result < 2  Recent Outpatient Visits            2 weeks ago Osteoarthritis of left hip, uns

## 2017-10-10 ENCOUNTER — OFFICE VISIT (OUTPATIENT)
Dept: NEUROLOGY | Facility: CLINIC | Age: 57
End: 2017-10-10

## 2017-10-10 VITALS
DIASTOLIC BLOOD PRESSURE: 72 MMHG | SYSTOLIC BLOOD PRESSURE: 108 MMHG | BODY MASS INDEX: 32.96 KG/M2 | WEIGHT: 210 LBS | RESPIRATION RATE: 16 BRPM | HEIGHT: 67 IN | HEART RATE: 84 BPM

## 2017-10-10 DIAGNOSIS — M25.552 LEFT HIP PAIN: Primary | ICD-10-CM

## 2017-10-10 PROCEDURE — 99213 OFFICE O/P EST LOW 20 MIN: CPT | Performed by: PHYSICAL MEDICINE & REHABILITATION

## 2017-10-10 RX ORDER — DICLOFENAC SODIUM 75 MG/1
75 TABLET, DELAYED RELEASE ORAL 2 TIMES DAILY PRN
Qty: 60 TABLET | Refills: 0 | Status: SHIPPED | OUTPATIENT
Start: 2017-10-10 | End: 2017-12-04

## 2017-10-10 NOTE — PATIENT INSTRUCTIONS
1) Take voltaren 75mg twice a day as needed. Do not take this with advil. Take this with food. 2) I have ordered a left hip injection under x ray guidance. We will contact you with the approval and then you can get scheduled.     As of October 6th 2014, family member will be picking up prescription, office must be given name of individual in advance and they must present an ID as well. · The name of the person picking up your prescription must be documented in your chart.

## 2017-10-10 NOTE — PROGRESS NOTES
Yury Escamilla DO  [address]  [place] [address]  Trip Ignacio DO    Chief Complaint: left hip and thigh pain    HPI:  Edd Kirby is a 56year old female who presents with complaints of Hip Pain (LOV: 9/19/17.  F/U on left hip and t Range of motion: limited lumbar extension without pain. Full lumbar flexion without pain. + thigh pain on external rotation of the left hip. No pain on internal rotation of the left hip.   Palpation: No pain with palpation of the left lumbar paraspinals or

## 2017-10-16 ENCOUNTER — TELEPHONE (OUTPATIENT)
Dept: NEUROLOGY | Facility: CLINIC | Age: 57
End: 2017-10-16

## 2017-10-16 NOTE — TELEPHONE ENCOUNTER
Received inbasket from [email or web address] advising of approval for left intra-articular hip injection for one unit/DOS. Will inform Nursing.

## 2017-11-06 ENCOUNTER — TELEPHONE (OUTPATIENT)
Dept: NEUROLOGY | Facility: CLINIC | Age: 57
End: 2017-11-06

## 2017-11-06 DIAGNOSIS — M25.552 LEFT HIP PAIN: Primary | ICD-10-CM

## 2017-11-06 NOTE — TELEPHONE ENCOUNTER
S/w pt she stated she started taking Diclofenac on 10/10/17 w/ no relief. Pt will call back to schedule injection for next month. Pt would like to know if their is an alternative medication she could try. Please advise.

## 2017-11-07 RX ORDER — MELOXICAM 7.5 MG/1
7.5 TABLET ORAL DAILY
Qty: 60 TABLET | Refills: 0 | Status: SHIPPED | OUTPATIENT
Start: 2017-11-07 | End: 2018-04-23

## 2017-11-07 NOTE — TELEPHONE ENCOUNTER
I prefer mobic 7.5mg; may increase the dose to twice a day if the AM dose does not work. Indocin is a good anti-inflammatory but it has the highest GI side effects, and if she is going to take an NSAID on a regular basis I recommend Mobic over Indocin.  Ple

## 2017-11-07 NOTE — TELEPHONE ENCOUNTER
S/w pt to suggest Tramadol medication. Pt stated she has not tried Tramadol in the past and would prefer to try another anti-inflammatory. Pt would like to know if she could try Indomethacin. Please advise.

## 2017-11-07 NOTE — TELEPHONE ENCOUNTER
S/w pt to notify her of recommendations stated below per Dr. Rena Bowens. Pt stated she would like to take Mobic. Script pended.

## 2017-12-04 ENCOUNTER — TELEPHONE (OUTPATIENT)
Dept: NEUROLOGY | Facility: CLINIC | Age: 57
End: 2017-12-04

## 2017-12-04 NOTE — TELEPHONE ENCOUNTER
Patient has been scheduled for a Left intra-articular hip injection local on 12/13/17 at [address] [place] [address]. Medications and allergies reviewed. Patient informed to hold aspirins, nsaids, blood thinners, vitamins and fish oils 7 days prior to procedure.  Patient informed

## 2017-12-07 ENCOUNTER — OFFICE VISIT (OUTPATIENT)
Dept: FAMILY MEDICINE CLINIC | Facility: CLINIC | Age: 57
End: 2017-12-07

## 2017-12-07 VITALS
DIASTOLIC BLOOD PRESSURE: 63 MMHG | HEART RATE: 106 BPM | BODY MASS INDEX: 31.86 KG/M2 | WEIGHT: 203 LBS | HEIGHT: 67 IN | SYSTOLIC BLOOD PRESSURE: 94 MMHG | TEMPERATURE: 99 F

## 2017-12-07 DIAGNOSIS — J40 BRONCHITIS: Primary | ICD-10-CM

## 2017-12-07 PROCEDURE — 99213 OFFICE O/P EST LOW 20 MIN: CPT | Performed by: NURSE PRACTITIONER

## 2017-12-07 RX ORDER — PREDNISONE 20 MG/1
TABLET ORAL
Qty: 15 TABLET | Refills: 0 | Status: SHIPPED | OUTPATIENT
Start: 2017-12-07 | End: 2018-01-15

## 2017-12-07 RX ORDER — ALBUTEROL SULFATE 90 UG/1
2 AEROSOL, METERED RESPIRATORY (INHALATION) EVERY 4 HOURS PRN
Qty: 18 G | Refills: 5 | Status: SHIPPED | OUTPATIENT
Start: 2017-12-07 | End: 2018-11-20

## 2017-12-07 RX ORDER — CODEINE PHOSPHATE AND GUAIFENESIN 10; 100 MG/5ML; MG/5ML
5 SOLUTION ORAL EVERY 6 HOURS PRN
Qty: 180 ML | Refills: 0 | Status: SHIPPED | OUTPATIENT
Start: 2017-12-07 | End: 2017-12-14

## 2017-12-07 NOTE — PROGRESS NOTES
HPI  Pt here for congestion, sore throat, cough with clear sputum; pt became very short of breath last night. No history of asthma. S/s started about 5 days ago an are worsening.      Review of Systems   Constitutional: Positive for activity change and fat status:   Spouse name: N/A    Years of education: N/A  Number of children: N/A     Occupational History  None on file     Social History Main Topics   Smoking status: Former Smoker  1.00 Packs/day  For 16.00 Years     Types: Cigarettes    Quit date: well-nourished. No distress. HENT:   Head: Normocephalic and atraumatic. Right Ear: External ear normal.   Left Ear: External ear normal.   Nose: Mucosal edema and rhinorrhea present.    Mouth/Throat: Uvula is midline and mucous membranes are normal. Po Inhale 2 puffs into the lungs every 4 (four) hours as needed for Wheezing or Shortness of Breath.           Dispense:  18 g          Refill:  5      Spacer/Aero Chamber Mouthpiece Does not apply Misc          Sig: Use with HFA inhaler as directed          D

## 2017-12-07 NOTE — H&P
HPI  Pt here for congestion, sore throat, cough with clear sputum; pt became very short of breath last night.   No history of asthma    Review of Systems     12/07/17  1027   BP: 94/63   Pulse: 106   Temp: 98.6 °F (37 °C)   TempSrc: Oral   Weight: 203 lb (9 Comment: 20 years ago    Hx of Radiation Treatments No    Regular use of sun block No    Caffeine Concern Yes    Comment: Soda    Exercise No     Social History Narrative    The patient does not use an assistive device. .      The patient does live in a raffy

## 2017-12-07 NOTE — PATIENT INSTRUCTIONS
What Is Acute Bronchitis? Acute bronchitis is when the airways in your lungs (bronchial tubes) become red and swollen (inflamed). It is usually caused by a viral infection. But it can also occur because of a bacteria or allergen.  Symptoms include a coug · A chest X-ray. This is done if your healthcare provider thinks you have pneumonia. · Tests to check for an underlying condition. Other tests may be done to check for things such as allergies, asthma, or COPD (chronic obstructive pulmonary disease).  You · Take the medicines as directed. For instance, some medicines should be taken with food. · Ask about side effects. Ask your provider or pharmacist what side effects are common, and what to do about them.   Follow-up care  You should see your provider preston Colds are due to viral infections and are very common. Sore throat is a prominent, and often the first, symptom. The cough that accompanies most colds is annoying but helps physiologically to protect the lungs and clear them of secretions.  Antibiotics are

## 2017-12-11 ENCOUNTER — TELEPHONE (OUTPATIENT)
Dept: NEUROLOGY | Facility: CLINIC | Age: 57
End: 2017-12-11

## 2017-12-11 NOTE — TELEPHONE ENCOUNTER
Spoke to patient. She states that she was on prednisone last week because she was sick. She wanted to get better before having injection. Cancelled patient for 12/13 and rescheduled for 1/10/18.

## 2018-01-03 DIAGNOSIS — I10 ESSENTIAL HYPERTENSION, BENIGN: ICD-10-CM

## 2018-01-03 RX ORDER — LOSARTAN POTASSIUM AND HYDROCHLOROTHIAZIDE 25; 100 MG/1; MG/1
1 TABLET ORAL
Qty: 90 TABLET | Refills: 0 | Status: SHIPPED | OUTPATIENT
Start: 2018-01-03 | End: 2018-03-29

## 2018-01-03 NOTE — TELEPHONE ENCOUNTER
Failed protocol no recent labs. Appt made for January 27th.      Hypertensive Medications  Protocol Criteria:  · Appointment scheduled in the past 6 months or in the next 3 months  · BMP or CMP in the past 12 months  · Creatinine result < 2  Recent Outpat

## 2018-01-04 ENCOUNTER — TELEPHONE (OUTPATIENT)
Dept: NEUROLOGY | Facility: CLINIC | Age: 58
End: 2018-01-04

## 2018-01-04 NOTE — TELEPHONE ENCOUNTER
Patient requesting [facility] paperwork. Placed on Dr Vivek Grijalva desrussel for review.

## 2018-01-04 NOTE — TELEPHONE ENCOUNTER
Spoke to mark Gill to fill out [place] paperwork. Advised for patient to ask primary care to do so. Message left on patient voice mail with Dr Keith Fritz instructions. [place] paperwork left in office shredded.

## 2018-01-05 ENCOUNTER — TELEPHONE (OUTPATIENT)
Dept: OTHER | Age: 58
End: 2018-01-05

## 2018-01-05 NOTE — TELEPHONE ENCOUNTER
Pt needs to fax form to Dr. Tamara Lopez, provided pt with fax number for [place] office.      Please respond to pool: NERI JUAN ADO LPN/TOBY

## 2018-01-10 ENCOUNTER — OFFICE VISIT (OUTPATIENT)
Dept: SURGERY | Facility: CLINIC | Age: 58
End: 2018-01-10

## 2018-01-10 DIAGNOSIS — M25.552 LEFT HIP PAIN: Primary | ICD-10-CM

## 2018-01-10 PROCEDURE — 77002 NEEDLE LOCALIZATION BY XRAY: CPT | Performed by: PHYSICAL MEDICINE & REHABILITATION

## 2018-01-10 PROCEDURE — 20610 DRAIN/INJ JOINT/BURSA W/O US: CPT | Performed by: PHYSICAL MEDICINE & REHABILITATION

## 2018-01-10 NOTE — PROCEDURES
left intra-articular hip injection    Indication: left hip osteoarthritis, left leg pain    Description of procedure: After discussion of the risks and benefits of the procedure, informed consent was signed and the patient was marked to confirm the correct

## 2018-01-10 NOTE — TELEPHONE ENCOUNTER
Patient calling to check status of FMLA paperwork and has appointment scheduled for 01/15/2018.      Please Advise

## 2018-01-15 ENCOUNTER — OFFICE VISIT (OUTPATIENT)
Dept: FAMILY MEDICINE CLINIC | Facility: CLINIC | Age: 58
End: 2018-01-15

## 2018-01-15 VITALS
RESPIRATION RATE: 16 BRPM | DIASTOLIC BLOOD PRESSURE: 83 MMHG | HEIGHT: 67 IN | TEMPERATURE: 98 F | SYSTOLIC BLOOD PRESSURE: 125 MMHG | HEART RATE: 84 BPM | BODY MASS INDEX: 32.52 KG/M2 | WEIGHT: 207.19 LBS

## 2018-01-15 DIAGNOSIS — R68.89 CHRONIC THROAT CLEARING: Primary | ICD-10-CM

## 2018-01-15 DIAGNOSIS — J30.89 OTHER ALLERGIC RHINITIS: ICD-10-CM

## 2018-01-15 PROCEDURE — 99214 OFFICE O/P EST MOD 30 MIN: CPT | Performed by: FAMILY MEDICINE

## 2018-01-15 PROCEDURE — 99212 OFFICE O/P EST SF 10 MIN: CPT | Performed by: FAMILY MEDICINE

## 2018-01-15 RX ORDER — AMOXICILLIN 875 MG/1
875 TABLET, COATED ORAL 2 TIMES DAILY
Qty: 20 TABLET | Refills: 0 | Status: SHIPPED | OUTPATIENT
Start: 2018-01-15 | End: 2018-01-25

## 2018-01-15 RX ORDER — MONTELUKAST SODIUM 10 MG/1
10 TABLET ORAL NIGHTLY
Qty: 90 TABLET | Refills: 1 | Status: SHIPPED | OUTPATIENT
Start: 2018-01-15 | End: 2021-06-12

## 2018-01-15 NOTE — H&P
[facility]    [facility] and Physical    Liss De Jesus Patient Status:  Outpatient    6/15/1960 MRN [id number]   Location [address] Attending No att. providers found   Hosp Day # 0 PCP Abner South, 3.2 oz (94 kg), not currently breastfeeding.   Physical Exam  Cervical Papanicolaou {Cervical Pap:5397}    Results:     Lab Results  Component Value Date   WBC 9.1 05/30/2015   HGB 14.4 05/30/2015   HCT 42.1 05/30/2015    05/30/2015   CREATSERUM 0.75

## 2018-01-15 NOTE — PROGRESS NOTES
Patient ID: Lisa Scanlon is a 56year old female. HPI  Patient presents with:  Hypertension  Cough    She saw the nurse practitioner Radha Keen on December 7, 2017. She was sick 5 days prior.   She is placed on prednisone and an inhaler along with some Ro HISTORY      Comment: amniocentesis  No date: SKIN SURGERY      Comment: excision of pilonidal cyst X2`  No date: SKIN SURGERY      Comment: several cyst excision   No date: TONSILLECTOMY       Current Outpatient Prescriptions:  Losartan Potassium-HCTZ 100 Diagnoses and all orders for this visit:    Chronic throat clearing  -     amoxicillin 875 MG Oral Tab; Take 1 tablet (875 mg total) by mouth 2 (two) times daily.   He case there is a remnant of a sinus infection I will start her on amoxicillin for 10 d

## 2018-01-16 ENCOUNTER — TELEPHONE (OUTPATIENT)
Dept: ADMINISTRATIVE | Age: 58
End: 2018-01-16

## 2018-01-16 NOTE — TELEPHONE ENCOUNTER
Dr. Omid Werner pending in [place]. Pt is requesting 1-3 days intermittent time off starting on 1/10/18 due to Hip pain for 12 months. Do you approve? Please advise.     Thank you,  [facility]

## 2018-02-01 ENCOUNTER — OFFICE VISIT (OUTPATIENT)
Dept: NEUROLOGY | Facility: CLINIC | Age: 58
End: 2018-02-01

## 2018-02-01 VITALS
RESPIRATION RATE: 16 BRPM | WEIGHT: 207 LBS | HEART RATE: 81 BPM | DIASTOLIC BLOOD PRESSURE: 78 MMHG | HEIGHT: 67 IN | BODY MASS INDEX: 32.49 KG/M2 | SYSTOLIC BLOOD PRESSURE: 104 MMHG

## 2018-02-01 DIAGNOSIS — M25.552 LEFT HIP PAIN: Primary | ICD-10-CM

## 2018-02-01 PROCEDURE — 99213 OFFICE O/P EST LOW 20 MIN: CPT | Performed by: PHYSICAL MEDICINE & REHABILITATION

## 2018-02-01 NOTE — PATIENT INSTRUCTIONS
As of October 6th 2014, the Drug Enforcement Agency [facility]) is reclassifying all hydrocodone combination medications from Schedule III to Schedule II. This includes medications such as Norco, Vicodin, Lortab, Zohydro, and Vicoprofen.     What this means for y

## 2018-02-01 NOTE — PROGRESS NOTES
HPI:    Patient ID: Mariela Contreras is a 56year old female. This is a 56year old female who presents for follow up of left anterior thigh pain.  She underwent the following treatment:    1) left intraarticular hip injection by radiology, 5/2017  2) left 0     Allergies:No Known Allergies   PHYSICAL EXAM:   Physical Exam   Constitutional: She appears well-developed and well-nourished. HENT:   Head: Normocephalic and atraumatic.    Eyes: Conjunctivae and EOM are normal.   Musculoskeletal:   Lumbar range of

## 2018-02-02 ENCOUNTER — TELEPHONE (OUTPATIENT)
Dept: NEUROLOGY | Facility: CLINIC | Age: 58
End: 2018-02-02

## 2018-02-02 NOTE — TELEPHONE ENCOUNTER
Per [facility] referral team patient must have arthrogram done at hospital, need radiologist to perform arthrogram. Left detailed message for patient with above directions.

## 2018-03-29 ENCOUNTER — OFFICE VISIT (OUTPATIENT)
Dept: FAMILY MEDICINE CLINIC | Facility: CLINIC | Age: 58
End: 2018-03-29

## 2018-03-29 ENCOUNTER — HOSPITAL ENCOUNTER (OUTPATIENT)
Dept: GENERAL RADIOLOGY | Age: 58
Discharge: HOME | End: 2018-03-29
Attending: FAMILY MEDICINE
Payer: COMMERCIAL

## 2018-03-29 VITALS
HEIGHT: 67 IN | BODY MASS INDEX: 32.65 KG/M2 | DIASTOLIC BLOOD PRESSURE: 87 MMHG | TEMPERATURE: 98 F | WEIGHT: 208 LBS | SYSTOLIC BLOOD PRESSURE: 129 MMHG | HEART RATE: 84 BPM

## 2018-03-29 DIAGNOSIS — M20.11 HALLUX VALGUS (ACQUIRED), RIGHT FOOT: ICD-10-CM

## 2018-03-29 DIAGNOSIS — I10 ESSENTIAL HYPERTENSION, BENIGN: ICD-10-CM

## 2018-03-29 DIAGNOSIS — M25.552 LEFT HIP PAIN: ICD-10-CM

## 2018-03-29 DIAGNOSIS — B35.1 ONYCHOMYCOSIS OF TOENAIL: ICD-10-CM

## 2018-03-29 DIAGNOSIS — M21.42 PES PLANUS OF BOTH FEET: ICD-10-CM

## 2018-03-29 DIAGNOSIS — M79.671 RIGHT FOOT PAIN: Primary | ICD-10-CM

## 2018-03-29 DIAGNOSIS — M79.671 RIGHT FOOT PAIN: ICD-10-CM

## 2018-03-29 DIAGNOSIS — M21.41 PES PLANUS OF BOTH FEET: ICD-10-CM

## 2018-03-29 DIAGNOSIS — M16.12 PRIMARY OSTEOARTHRITIS OF LEFT HIP: ICD-10-CM

## 2018-03-29 PROCEDURE — 99214 OFFICE O/P EST MOD 30 MIN: CPT | Performed by: FAMILY MEDICINE

## 2018-03-29 PROCEDURE — 99212 OFFICE O/P EST SF 10 MIN: CPT | Performed by: FAMILY MEDICINE

## 2018-03-29 PROCEDURE — 73502 X-RAY EXAM HIP UNI 2-3 VIEWS: CPT | Performed by: FAMILY MEDICINE

## 2018-03-29 PROCEDURE — 73630 X-RAY EXAM OF FOOT: CPT | Performed by: FAMILY MEDICINE

## 2018-03-29 RX ORDER — LOSARTAN POTASSIUM AND HYDROCHLOROTHIAZIDE 25; 100 MG/1; MG/1
1 TABLET ORAL
Qty: 90 TABLET | Refills: 1 | Status: ON HOLD | OUTPATIENT
Start: 2018-03-29 | End: 2018-08-09

## 2018-03-29 NOTE — PROGRESS NOTES
Patient ID: Edd Kirby is a 56year old female. HPI  Patient presents with: Foot Pain  Medication Request    She states yesterday she noticed that the top of right foot was very painful. She had no trauma. She was able to wiggle her toes.   He wa SKIN SURGERY      Comment: excision of pilonidal cyst X2`  No date: SKIN SURGERY      Comment: several cyst excision   No date: TONSILLECTOMY       Current Outpatient Prescriptions:  Montelukast Sodium (SINGULAIR) 10 MG Oral Tab Take 1 tablet (10 mg total) that she may need her inserts but also she may need to discuss the fungus of the toes. Pes planus of both feet  -     PODIATRY - INTERNAL  -     XR FOOT, COMPLETE (MIN 3 VIEWS), RIGHT (CPT=73990);  Future    Hallux valgus (acquired), right foot  -     PODI agrees to plan. No Follow-up on file.       Jermain Mcmahon,   3/29/2018

## 2018-03-29 NOTE — PATIENT INSTRUCTIONS
Have to see the foot doctor come back and see me for a full physical exam.  Come in fasting that day and only a water that morning so we can do all of your blood work.

## 2018-04-03 NOTE — PROGRESS NOTES
Spoke with patient (identified name and ), results reviewed and agrees with plan.   Discussed R foot xray DJD 1 and 2nd metatarsal and relayed Dr Jackelin Pedroza message about L hip xray, referrals are authorized, patient will f/u with Dr Ángela Muniz and Dr Jose Granger

## 2018-04-03 NOTE — PROGRESS NOTES
Spoke with patient (identified name and ), results reviewed and agrees with plan.   Patient plans to f/u with Dr Brayan Rodriguez for Hip pain and Dr Brennen Cook for foot pain, referrals are authorized

## 2018-04-23 ENCOUNTER — OFFICE VISIT (OUTPATIENT)
Dept: FAMILY MEDICINE CLINIC | Facility: CLINIC | Age: 58
End: 2018-04-23

## 2018-04-23 ENCOUNTER — LAB ENCOUNTER (OUTPATIENT)
Dept: LAB | Age: 58
End: 2018-04-23
Attending: FAMILY MEDICINE
Payer: COMMERCIAL

## 2018-04-23 ENCOUNTER — APPOINTMENT (OUTPATIENT)
Dept: LAB | Age: 58
End: 2018-04-23
Attending: FAMILY MEDICINE
Payer: COMMERCIAL

## 2018-04-23 ENCOUNTER — OFFICE VISIT (OUTPATIENT)
Dept: PODIATRY CLINIC | Facility: CLINIC | Age: 58
End: 2018-04-23

## 2018-04-23 VITALS
TEMPERATURE: 98 F | BODY MASS INDEX: 32.83 KG/M2 | HEART RATE: 81 BPM | RESPIRATION RATE: 12 BRPM | WEIGHT: 209.19 LBS | DIASTOLIC BLOOD PRESSURE: 77 MMHG | SYSTOLIC BLOOD PRESSURE: 112 MMHG | HEIGHT: 67 IN

## 2018-04-23 DIAGNOSIS — I10 ESSENTIAL HYPERTENSION, BENIGN: ICD-10-CM

## 2018-04-23 DIAGNOSIS — E78.2 MIXED HYPERLIPIDEMIA: ICD-10-CM

## 2018-04-23 DIAGNOSIS — Z12.11 SCREENING FOR COLON CANCER: ICD-10-CM

## 2018-04-23 DIAGNOSIS — B35.1 ONYCHOMYCOSIS: Primary | ICD-10-CM

## 2018-04-23 DIAGNOSIS — Z00.00 ADULT GENERAL MEDICAL EXAM: ICD-10-CM

## 2018-04-23 DIAGNOSIS — Z12.31 VISIT FOR SCREENING MAMMOGRAM: ICD-10-CM

## 2018-04-23 DIAGNOSIS — Z80.0 FAMILY HISTORY OF COLON CANCER: ICD-10-CM

## 2018-04-23 DIAGNOSIS — E66.09 NON MORBID OBESITY DUE TO EXCESS CALORIES: ICD-10-CM

## 2018-04-23 DIAGNOSIS — Z12.4 CERVICAL CANCER SCREENING: ICD-10-CM

## 2018-04-23 DIAGNOSIS — Z00.00 ADULT GENERAL MEDICAL EXAM: Primary | ICD-10-CM

## 2018-04-23 PROCEDURE — 82306 VITAMIN D 25 HYDROXY: CPT

## 2018-04-23 PROCEDURE — 85025 COMPLETE CBC W/AUTO DIFF WBC: CPT

## 2018-04-23 PROCEDURE — 93010 ELECTROCARDIOGRAM REPORT: CPT | Performed by: FAMILY MEDICINE

## 2018-04-23 PROCEDURE — 80050 GENERAL HEALTH PANEL: CPT

## 2018-04-23 PROCEDURE — 99212 OFFICE O/P EST SF 10 MIN: CPT | Performed by: PODIATRIST

## 2018-04-23 PROCEDURE — 93005 ELECTROCARDIOGRAM TRACING: CPT

## 2018-04-23 PROCEDURE — 80061 LIPID PANEL: CPT

## 2018-04-23 PROCEDURE — 99396 PREV VISIT EST AGE 40-64: CPT | Performed by: FAMILY MEDICINE

## 2018-04-23 PROCEDURE — 99243 OFF/OP CNSLTJ NEW/EST LOW 30: CPT | Performed by: PODIATRIST

## 2018-04-23 PROCEDURE — 36415 COLL VENOUS BLD VENIPUNCTURE: CPT

## 2018-04-23 NOTE — PROGRESS NOTES
Patient ID: Abby Oconnor is a 56year old female. HPI  Patient presents with:  Routine Physical    She states she had to stop smoking in 2006. She is . 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 She has no tonsils.     She has Neurological: Negative for dizziness, seizures, speech difficulty and light-headedness. Hematological: Negative for adenopathy. Does not bruise/bleed easily. Psychiatric/Behavioral: Negative for dysphoric mood and hallucinations.  The patient is not n stairs. Current Outpatient Prescriptions:  Losartan Potassium-HCTZ 100-25 MG Oral Tab Take 1 tablet by mouth once daily. Disp: 90 tablet Rfl: 1   Montelukast Sodium (SINGULAIR) 10 MG Oral Tab Take 1 tablet (10 mg total) by mouth nightly.  Dis Location: Right arm    Patient Position: Sitting    Cuff Size: adult    Pulse: 81    Resp: 12    Temp: 98.2 °F (36.8 °C)    TempSrc: Oral    Weight: 209 lb 3.2 oz (94.9 kg)    Height: 5' 7\" (1.702 m)             ASSESSMENT/PLAN:     Diagnoses and all orde

## 2018-04-23 NOTE — PROGRESS NOTES
HPI:    Patient ID: Nani Rojas is a 56year old female. HPI  This 56-year-old female presents as a new patient to me on consult from [address] Patient states that she is here because of frustrations associated with her toenails.   She also reports present signs of infection. I discussed and reviewed the etiology, progression, and considerations of care for fungus toenails.   After a lengthy discussion and based on the degree of change patient's only option of care is to consider the oral antifungal

## 2018-04-30 ENCOUNTER — OFFICE VISIT (OUTPATIENT)
Dept: ORTHOPEDICS CLINIC | Facility: CLINIC | Age: 58
End: 2018-04-30

## 2018-04-30 DIAGNOSIS — M16.12 ARTHRITIS OF LEFT HIP: Primary | ICD-10-CM

## 2018-04-30 PROCEDURE — 99243 OFF/OP CNSLTJ NEW/EST LOW 30: CPT | Performed by: ORTHOPAEDIC SURGERY

## 2018-04-30 PROCEDURE — 99212 OFFICE O/P EST SF 10 MIN: CPT | Performed by: ORTHOPAEDIC SURGERY

## 2018-04-30 NOTE — H&P
Chief Complaint: Left hip pain    NURSING INTAKE COMMENTS: Patient presents with:  Hip Pain: Left - onset a couple years ago - no injury - has pain in the groin area rated as 5-8/10 with walking, no numbness or tingling, has x-rays in the system       Hist Packs/day: 1.00      Years: 16.00        Types: Cigarettes     Quit date: 7/10/2006  Smokeless tobacco: Never Used                      Alcohol use:  No               Comment: occasionally        A comprehensive 10 point review of systems was completed an back and the hip and the hip injections by far helped more. This pain affects her activities of daily living and she has been dealing with it for many years. She would like a hip replacement. I think that is reasonable.   In addition to the usual risks w

## 2018-05-10 ENCOUNTER — TELEPHONE (OUTPATIENT)
Dept: ORTHOPEDICS CLINIC | Facility: CLINIC | Age: 58
End: 2018-05-10

## 2018-05-10 NOTE — TELEPHONE ENCOUNTER
Call to [place]. No answer. Left voice message. Requesting call back to set up an appointment.  Follow up on nail culture

## 2018-05-11 NOTE — TELEPHONE ENCOUNTER
Call to Luis Manuel [facility]erta   No answer. Left message .  REquested call back for a follow up appointment

## 2018-05-30 ENCOUNTER — TELEPHONE (OUTPATIENT)
Dept: FAMILY MEDICINE CLINIC | Facility: CLINIC | Age: 58
End: 2018-05-30

## 2018-05-30 NOTE — TELEPHONE ENCOUNTER
Pt stts she is going to have hip surgery 6/19 and would like a presurgical visit with Charo Gonsales. When I tried to book the next available appt was on 6/25.  Please advise

## 2018-05-31 NOTE — TELEPHONE ENCOUNTER
Dr Perlita Davis, please advise on lab/testing needed. Patient scheduled for pre-op on Thurs 6/7/18 at 3:40 pm with Dr Alfonzo Banks. She asked about lab work, EKG, tests needed.     Please respond to pool: EM DRAKE ADO LPN/TOBY

## 2018-06-01 ENCOUNTER — TELEPHONE (OUTPATIENT)
Dept: ORTHOPEDICS CLINIC | Facility: CLINIC | Age: 58
End: 2018-06-01

## 2018-06-01 NOTE — TELEPHONE ENCOUNTER
Surgery for 6-19-18  Surgery with Dr. John Ordonez  Left hip arthroplasty  In patient  [facility]  [phone]    Diagnosis code M16.12  Insurance   [employer] advantage [facility]  Referral in system  Authorization pending

## 2018-06-07 ENCOUNTER — OFFICE VISIT (OUTPATIENT)
Dept: FAMILY MEDICINE CLINIC | Facility: CLINIC | Age: 58
End: 2018-06-07

## 2018-06-07 ENCOUNTER — APPOINTMENT (OUTPATIENT)
Dept: LAB | Age: 58
End: 2018-06-07
Attending: FAMILY MEDICINE
Payer: COMMERCIAL

## 2018-06-07 ENCOUNTER — TELEPHONE (OUTPATIENT)
Dept: ORTHOPEDICS CLINIC | Facility: CLINIC | Age: 58
End: 2018-06-07

## 2018-06-07 VITALS
DIASTOLIC BLOOD PRESSURE: 81 MMHG | TEMPERATURE: 98 F | WEIGHT: 207.31 LBS | HEART RATE: 92 BPM | SYSTOLIC BLOOD PRESSURE: 121 MMHG | HEIGHT: 67 IN | BODY MASS INDEX: 32.54 KG/M2 | RESPIRATION RATE: 14 BRPM

## 2018-06-07 DIAGNOSIS — G89.29 CHRONIC HIP PAIN, LEFT: ICD-10-CM

## 2018-06-07 DIAGNOSIS — Z01.818 PREOP EXAMINATION: ICD-10-CM

## 2018-06-07 DIAGNOSIS — M16.12 PRIMARY OSTEOARTHRITIS OF LEFT HIP: Primary | ICD-10-CM

## 2018-06-07 DIAGNOSIS — E78.2 MIXED HYPERLIPIDEMIA: ICD-10-CM

## 2018-06-07 DIAGNOSIS — M25.552 CHRONIC HIP PAIN, LEFT: ICD-10-CM

## 2018-06-07 DIAGNOSIS — R79.1 ELEVATED PARTIAL THROMBOPLASTIN TIME (PTT): ICD-10-CM

## 2018-06-07 DIAGNOSIS — I10 ESSENTIAL HYPERTENSION, BENIGN: ICD-10-CM

## 2018-06-07 PROCEDURE — 87081 CULTURE SCREEN ONLY: CPT

## 2018-06-07 PROCEDURE — 80053 COMPREHEN METABOLIC PANEL: CPT

## 2018-06-07 PROCEDURE — 85610 PROTHROMBIN TIME: CPT

## 2018-06-07 PROCEDURE — 85730 THROMBOPLASTIN TIME PARTIAL: CPT

## 2018-06-07 PROCEDURE — 36415 COLL VENOUS BLD VENIPUNCTURE: CPT

## 2018-06-07 PROCEDURE — 99212 OFFICE O/P EST SF 10 MIN: CPT | Performed by: FAMILY MEDICINE

## 2018-06-07 PROCEDURE — 99244 OFF/OP CNSLTJ NEW/EST MOD 40: CPT | Performed by: FAMILY MEDICINE

## 2018-06-07 PROCEDURE — 81001 URINALYSIS AUTO W/SCOPE: CPT

## 2018-06-07 NOTE — PROGRESS NOTES
Patient ID: Carmela Patricia is a 56year old female. HPI  Patient presents with:  Pre-Op Exam: left hip surgery on 06-19-18    Patient is here for preoperative physical exam for clearance for left hip arthroplasty on June 19, 2018 by Dr. Laya De La Cruz. 04/23/2018   BUNCREA 15.7 04/23/2018   CREATSERUM 0.70 04/23/2018   ANIONGAP 8 04/23/2018   GFRNAA >60 04/23/2018   GFRAA >60 04/23/2018   CA 9.7 04/23/2018   OSMOCALC 287 04/23/2018   ALKPHO 55 04/23/2018   AST 21 04/23/2018   ALT 22 04/23/2018   ALKPHOS for: SAT    No results found for: IRON, IRONTOT, TIBC, IRONSAT, TRANSFERRIN, TIBCP, IRONBIND, SAT, SATUR    No results found for: ANNE      Lab Results  Component Value Date   UQZG58TD 13.1 04/23/2018     No results found for: PSA, QPSA, TOTPSASCREEN    === TONSILLECTOMY      Social History  Social History   Marital status:   Spouse name: N/A    Years of education: N/A  Number of children: N/A     Occupational History  None on file     Social History Main Topics   Smoking status: Former Smoker  1.00 Pa are normal.   Eyes: Conjunctivae and EOM are normal. Pupils are equal, round, and reactive to light. Neck: Normal range of motion. Neck supple. No thyromegaly present. Cardiovascular: Normal rate, regular rhythm and no murmur heard.   Pulmonary/Chest: E PTT I do not think her surgery needs to be postponed. Her INR, CBC, platelets were normal.  Thank you Remi for allowing me to be a part of Vinita's care. Patient may proceed with her surgery on June 19, 2018 for her left hip arthroplasty.     Preop ex

## 2018-06-07 NOTE — TELEPHONE ENCOUNTER
s/w pt and she states she no longer has a job and will lose her insurance by the end of the month.  Made PO appt on 6/28/18 @ 120pm.  KAREN julien

## 2018-06-07 NOTE — TELEPHONE ENCOUNTER
Pt calling to see if she can schedule her post op appt prior to 2 weeks after surgery. Pt would like to come in the last week of June. Sx is scheduled for 6/19. Please advise.

## 2018-06-07 NOTE — TELEPHONE ENCOUNTER
Spoke with patient and told her that it would be very advantageous for her to obtain extended insurance for at least 2 months so that she can have her therapy covered and potentially if any complication were to occur have coverage as well.   She will do cole

## 2018-06-11 NOTE — TELEPHONE ENCOUNTER
Pt called stating pt's insurance coverage will be continued. No break in coverage. Pt requesting a call back.

## 2018-06-12 ENCOUNTER — TELEPHONE (OUTPATIENT)
Dept: ORTHOPEDICS CLINIC | Facility: CLINIC | Age: 58
End: 2018-06-12

## 2018-06-12 ENCOUNTER — TELEPHONE (OUTPATIENT)
Dept: FAMILY MEDICINE CLINIC | Facility: CLINIC | Age: 58
End: 2018-06-12

## 2018-06-12 DIAGNOSIS — R79.1 ELEVATED PARTIAL THROMBOPLASTIN TIME (PTT): Primary | ICD-10-CM

## 2018-06-12 NOTE — TELEPHONE ENCOUNTER
Called patient. She was able to arrange for extended insurance coverage and we will proceed with surgery as scheduled.

## 2018-06-15 ENCOUNTER — TELEPHONE (OUTPATIENT)
Dept: FAMILY MEDICINE CLINIC | Facility: CLINIC | Age: 58
End: 2018-06-15

## 2018-06-15 ENCOUNTER — LAB ENCOUNTER (OUTPATIENT)
Dept: LAB | Age: 58
End: 2018-06-15
Attending: FAMILY MEDICINE
Payer: COMMERCIAL

## 2018-06-15 DIAGNOSIS — Z01.818 PREOP TESTING: ICD-10-CM

## 2018-06-15 DIAGNOSIS — R79.1 ELEVATED PARTIAL THROMBOPLASTIN TIME (PTT): ICD-10-CM

## 2018-06-15 PROCEDURE — 86901 BLOOD TYPING SEROLOGIC RH(D): CPT

## 2018-06-15 PROCEDURE — 85250 CLOT FACTOR IX PTC/CHRSTMAS: CPT

## 2018-06-15 PROCEDURE — 85610 PROTHROMBIN TIME: CPT

## 2018-06-15 PROCEDURE — 85246 CLOT FACTOR VIII VW ANTIGEN: CPT

## 2018-06-15 PROCEDURE — 86900 BLOOD TYPING SEROLOGIC ABO: CPT

## 2018-06-15 PROCEDURE — 85613 RUSSELL VIPER VENOM DILUTED: CPT

## 2018-06-15 PROCEDURE — 85240 CLOT FACTOR VIII AHG 1 STAGE: CPT

## 2018-06-15 PROCEDURE — 85730 THROMBOPLASTIN TIME PARTIAL: CPT

## 2018-06-15 PROCEDURE — 81241 F5 GENE: CPT

## 2018-06-15 PROCEDURE — 86850 RBC ANTIBODY SCREEN: CPT

## 2018-06-15 PROCEDURE — 85390 FIBRINOLYSINS SCREEN I&R: CPT

## 2018-06-15 PROCEDURE — 36415 COLL VENOUS BLD VENIPUNCTURE: CPT

## 2018-06-15 NOTE — TELEPHONE ENCOUNTER
Advised patient of Dr Isabella Ding notes. Patient verbalized understanding. Patient will go to lab now to complete additional bloodwork. Please follow up tomorrow on this.       Notes recorded by Aditya Cherry DO on 6/13/2018 at 10:33 PM CDT  PROTHROMBIN TI

## 2018-06-15 NOTE — TELEPHONE ENCOUNTER
Papi Abreu states that the patient was not aware that the doctor ordered addition testing for her. Papi Abreu would like the patient to be phoned to inform her of this order.  Pt is having surgery on 6-19-18

## 2018-06-16 NOTE — TELEPHONE ENCOUNTER
Patient did additional labs yesterday. Spoke with reference lab [facility] is a send out test and was sent to [facility] [place] today, they test on Monday. Rest of labs are done in house and should be resulted by Monday 6/18/18.   Patient surgery quan

## 2018-06-18 ENCOUNTER — TELEPHONE (OUTPATIENT)
Dept: FAMILY MEDICINE CLINIC | Facility: CLINIC | Age: 58
End: 2018-06-18

## 2018-06-18 NOTE — TELEPHONE ENCOUNTER
Sebas Khalil,      Note is in the computer. Labs are all in the computer as well. Patient can proceed with surgery. Go ahead and take a look at the information below.     Patient's PTT is mildly elevated but antiphospholipid antibody was negative and so far

## 2018-06-18 NOTE — TELEPHONE ENCOUNTER
No, if the antiphospholipid antibody came back positive then definitely yes but it does not look like she is at increased risk for clotting as it was neg.

## 2018-06-19 ENCOUNTER — SURGERY (OUTPATIENT)
Age: 58
End: 2018-06-19

## 2018-06-19 ENCOUNTER — HOSPITAL ENCOUNTER (INPATIENT)
Facility: HOSPITAL | Age: 58
LOS: 2 days | Discharge: HOME HEALTH | DRG: 470 | End: 2018-06-21
Attending: ORTHOPAEDIC SURGERY | Admitting: ORTHOPAEDIC SURGERY
Payer: COMMERCIAL

## 2018-06-19 ENCOUNTER — APPOINTMENT (OUTPATIENT)
Dept: GENERAL RADIOLOGY | Facility: HOSPITAL | Age: 58
DRG: 470 | End: 2018-06-19
Attending: ORTHOPAEDIC SURGERY
Payer: COMMERCIAL

## 2018-06-19 ENCOUNTER — ANESTHESIA (OUTPATIENT)
Dept: SURGERY | Facility: HOSPITAL | Age: 58
DRG: 470 | End: 2018-06-19
Payer: COMMERCIAL

## 2018-06-19 ENCOUNTER — ANESTHESIA EVENT (OUTPATIENT)
Dept: SURGERY | Facility: HOSPITAL | Age: 58
DRG: 470 | End: 2018-06-19
Payer: COMMERCIAL

## 2018-06-19 DIAGNOSIS — M16.12 ARTHRITIS OF LEFT HIP: ICD-10-CM

## 2018-06-19 DIAGNOSIS — Z01.818 PREOP TESTING: Primary | ICD-10-CM

## 2018-06-19 PROCEDURE — 73501 X-RAY EXAM HIP UNI 1 VIEW: CPT | Performed by: ORTHOPAEDIC SURGERY

## 2018-06-19 PROCEDURE — 0SRB04A REPLACEMENT OF LEFT HIP JOINT WITH CERAMIC ON POLYETHYLENE SYNTHETIC SUBSTITUTE, UNCEMENTED, OPEN APPROACH: ICD-10-PCS | Performed by: ORTHOPAEDIC SURGERY

## 2018-06-19 PROCEDURE — 99232 SBSQ HOSP IP/OBS MODERATE 35: CPT | Performed by: HOSPITALIST

## 2018-06-19 PROCEDURE — 76001 XR C-ARM FLUORO >1 HOUR  (CPT=76001): CPT | Performed by: ORTHOPAEDIC SURGERY

## 2018-06-19 DEVICE — PINNACLE GRIPTION ACETABULAR SHELL SECTOR 52MM OD
Type: IMPLANTABLE DEVICE | Site: HIP | Status: FUNCTIONAL
Brand: PINNACLE GRIPTION

## 2018-06-19 DEVICE — PINNACLE HIP SOLUTIONS ALTRX POLYETHYLENE ACETABULAR LINER NEUTRAL 36MM ID 52MM OD
Type: IMPLANTABLE DEVICE | Site: HIP | Status: FUNCTIONAL
Brand: PINNACLE ALTRX

## 2018-06-19 DEVICE — CORAIL HIP SYSTEM CEMENTLESS FEMORAL STEM 12/14 AMT 135 DEGREES KS SIZE 10 HA COATED STANDARD NO COLLAR
Type: IMPLANTABLE DEVICE | Site: HIP | Status: FUNCTIONAL
Brand: CORAIL

## 2018-06-19 DEVICE — BIOLOX DELTA CERAMIC FEMORAL HEAD +1.5 36MM DIA 12/14 TAPER
Type: IMPLANTABLE DEVICE | Site: HIP | Status: FUNCTIONAL
Brand: BIOLOX DELTA

## 2018-06-19 RX ORDER — SODIUM PHOSPHATE, DIBASIC AND SODIUM PHOSPHATE, MONOBASIC 7; 19 G/133ML; G/133ML
1 ENEMA RECTAL ONCE AS NEEDED
Status: DISCONTINUED | OUTPATIENT
Start: 2018-06-19 | End: 2018-06-21

## 2018-06-19 RX ORDER — ASPIRIN 325 MG
325 TABLET ORAL 2 TIMES DAILY
Qty: 30 TABLET | Refills: 0 | Status: SHIPPED | OUTPATIENT
Start: 2018-06-19 | End: 2018-11-20

## 2018-06-19 RX ORDER — ASPIRIN 325 MG
325 TABLET, DELAYED RELEASE (ENTERIC COATED) ORAL ONCE
Status: COMPLETED | OUTPATIENT
Start: 2018-06-19 | End: 2018-06-19

## 2018-06-19 RX ORDER — FAMOTIDINE 20 MG/1
20 TABLET ORAL ONCE
Status: COMPLETED | OUTPATIENT
Start: 2018-06-19 | End: 2018-06-19

## 2018-06-19 RX ORDER — CELECOXIB 200 MG/1
200 CAPSULE ORAL EVERY 12 HOURS SCHEDULED
Status: DISCONTINUED | OUTPATIENT
Start: 2018-06-19 | End: 2018-06-21

## 2018-06-19 RX ORDER — HALOPERIDOL 5 MG/ML
0.25 INJECTION INTRAMUSCULAR ONCE AS NEEDED
Status: DISCONTINUED | OUTPATIENT
Start: 2018-06-19 | End: 2018-06-19 | Stop reason: HOSPADM

## 2018-06-19 RX ORDER — ACETAMINOPHEN 500 MG
1000 TABLET ORAL ONCE
Status: DISCONTINUED | OUTPATIENT
Start: 2018-06-19 | End: 2018-06-19

## 2018-06-19 RX ORDER — KETOROLAC TROMETHAMINE 15 MG/ML
15 INJECTION, SOLUTION INTRAMUSCULAR; INTRAVENOUS EVERY 8 HOURS SCHEDULED
Status: COMPLETED | OUTPATIENT
Start: 2018-06-20 | End: 2018-06-20

## 2018-06-19 RX ORDER — OXYCODONE HCL 10 MG/1
10 TABLET, FILM COATED, EXTENDED RELEASE ORAL ONCE
Status: COMPLETED | OUTPATIENT
Start: 2018-06-19 | End: 2018-06-19

## 2018-06-19 RX ORDER — SODIUM CHLORIDE, SODIUM LACTATE, POTASSIUM CHLORIDE, CALCIUM CHLORIDE 600; 310; 30; 20 MG/100ML; MG/100ML; MG/100ML; MG/100ML
INJECTION, SOLUTION INTRAVENOUS CONTINUOUS
Status: DISCONTINUED | OUTPATIENT
Start: 2018-06-19 | End: 2018-06-19

## 2018-06-19 RX ORDER — SODIUM CHLORIDE, SODIUM LACTATE, POTASSIUM CHLORIDE, CALCIUM CHLORIDE 600; 310; 30; 20 MG/100ML; MG/100ML; MG/100ML; MG/100ML
INJECTION, SOLUTION INTRAVENOUS CONTINUOUS
Status: DISCONTINUED | OUTPATIENT
Start: 2018-06-19 | End: 2018-06-19 | Stop reason: HOSPADM

## 2018-06-19 RX ORDER — DEXAMETHASONE SODIUM PHOSPHATE 4 MG/ML
VIAL (ML) INJECTION AS NEEDED
Status: DISCONTINUED | OUTPATIENT
Start: 2018-06-19 | End: 2018-06-19 | Stop reason: SURG

## 2018-06-19 RX ORDER — POLYETHYLENE GLYCOL 3350 17 G/17G
17 POWDER, FOR SOLUTION ORAL DAILY PRN
Status: DISCONTINUED | OUTPATIENT
Start: 2018-06-19 | End: 2018-06-21

## 2018-06-19 RX ORDER — ACETAMINOPHEN 500 MG
1000 TABLET ORAL ONCE
Status: COMPLETED | OUTPATIENT
Start: 2018-06-19 | End: 2018-06-19

## 2018-06-19 RX ORDER — SENNOSIDES 8.6 MG
17.2 TABLET ORAL NIGHTLY
Status: DISCONTINUED | OUTPATIENT
Start: 2018-06-19 | End: 2018-06-21

## 2018-06-19 RX ORDER — METOCLOPRAMIDE HYDROCHLORIDE 5 MG/ML
10 INJECTION INTRAMUSCULAR; INTRAVENOUS EVERY 6 HOURS PRN
Status: DISCONTINUED | OUTPATIENT
Start: 2018-06-19 | End: 2018-06-21

## 2018-06-19 RX ORDER — CEFAZOLIN SODIUM/WATER 2 G/20 ML
2 SYRINGE (ML) INTRAVENOUS EVERY 8 HOURS
Status: COMPLETED | OUTPATIENT
Start: 2018-06-19 | End: 2018-06-20

## 2018-06-19 RX ORDER — MAGNESIUM HYDROXIDE 1200 MG/15ML
LIQUID ORAL CONTINUOUS PRN
Status: DISCONTINUED | OUTPATIENT
Start: 2018-06-19 | End: 2018-06-19

## 2018-06-19 RX ORDER — CELECOXIB 200 MG/1
200 CAPSULE ORAL 2 TIMES DAILY
Qty: 30 CAPSULE | Refills: 0 | Status: ON HOLD | OUTPATIENT
Start: 2018-06-19 | End: 2018-08-09

## 2018-06-19 RX ORDER — ASPIRIN 325 MG
325 TABLET ORAL 2 TIMES DAILY
Status: DISCONTINUED | OUTPATIENT
Start: 2018-06-19 | End: 2018-06-21

## 2018-06-19 RX ORDER — DIPHENHYDRAMINE HYDROCHLORIDE 50 MG/ML
25 INJECTION INTRAMUSCULAR; INTRAVENOUS ONCE AS NEEDED
Status: ACTIVE | OUTPATIENT
Start: 2018-06-19 | End: 2018-06-19

## 2018-06-19 RX ORDER — DIPHENHYDRAMINE HCL 25 MG
25 CAPSULE ORAL EVERY 4 HOURS PRN
Status: DISCONTINUED | OUTPATIENT
Start: 2018-06-19 | End: 2018-06-21

## 2018-06-19 RX ORDER — LABETALOL HYDROCHLORIDE 5 MG/ML
INJECTION, SOLUTION INTRAVENOUS AS NEEDED
Status: DISCONTINUED | OUTPATIENT
Start: 2018-06-19 | End: 2018-06-19 | Stop reason: SURG

## 2018-06-19 RX ORDER — LOSARTAN POTASSIUM AND HYDROCHLOROTHIAZIDE 25; 100 MG/1; MG/1
1 TABLET ORAL
Status: DISCONTINUED | OUTPATIENT
Start: 2018-06-19 | End: 2018-06-19

## 2018-06-19 RX ORDER — SODIUM CHLORIDE 0.9 % (FLUSH) 0.9 %
10 SYRINGE (ML) INJECTION AS NEEDED
Status: DISCONTINUED | OUTPATIENT
Start: 2018-06-19 | End: 2018-06-21

## 2018-06-19 RX ORDER — NALOXONE HYDROCHLORIDE 0.4 MG/ML
80 INJECTION, SOLUTION INTRAMUSCULAR; INTRAVENOUS; SUBCUTANEOUS AS NEEDED
Status: DISCONTINUED | OUTPATIENT
Start: 2018-06-19 | End: 2018-06-19 | Stop reason: HOSPADM

## 2018-06-19 RX ORDER — HYDROMORPHONE HYDROCHLORIDE 1 MG/ML
0.6 INJECTION, SOLUTION INTRAMUSCULAR; INTRAVENOUS; SUBCUTANEOUS EVERY 5 MIN PRN
Status: DISCONTINUED | OUTPATIENT
Start: 2018-06-19 | End: 2018-06-19 | Stop reason: HOSPADM

## 2018-06-19 RX ORDER — METOCLOPRAMIDE 10 MG/1
10 TABLET ORAL ONCE
Status: COMPLETED | OUTPATIENT
Start: 2018-06-19 | End: 2018-06-19

## 2018-06-19 RX ORDER — HYDROCODONE BITARTRATE AND ACETAMINOPHEN 5; 325 MG/1; MG/1
2 TABLET ORAL AS NEEDED
Status: DISCONTINUED | OUTPATIENT
Start: 2018-06-19 | End: 2018-06-19 | Stop reason: HOSPADM

## 2018-06-19 RX ORDER — HYDROMORPHONE HYDROCHLORIDE 1 MG/ML
0.4 INJECTION, SOLUTION INTRAMUSCULAR; INTRAVENOUS; SUBCUTANEOUS EVERY 5 MIN PRN
Status: DISCONTINUED | OUTPATIENT
Start: 2018-06-19 | End: 2018-06-19 | Stop reason: HOSPADM

## 2018-06-19 RX ORDER — DEXTROSE, SODIUM CHLORIDE, AND POTASSIUM CHLORIDE 5; .45; .15 G/100ML; G/100ML; G/100ML
INJECTION INTRAVENOUS CONTINUOUS
Status: DISCONTINUED | OUTPATIENT
Start: 2018-06-19 | End: 2018-06-21

## 2018-06-19 RX ORDER — MIDAZOLAM HYDROCHLORIDE 1 MG/ML
INJECTION INTRAMUSCULAR; INTRAVENOUS AS NEEDED
Status: DISCONTINUED | OUTPATIENT
Start: 2018-06-19 | End: 2018-06-19 | Stop reason: SURG

## 2018-06-19 RX ORDER — HYDROCODONE BITARTRATE AND ACETAMINOPHEN 10; 325 MG/1; MG/1
2 TABLET ORAL EVERY 4 HOURS PRN
Status: DISCONTINUED | OUTPATIENT
Start: 2018-06-19 | End: 2018-06-21

## 2018-06-19 RX ORDER — MONTELUKAST SODIUM 10 MG/1
10 TABLET ORAL NIGHTLY
Status: DISCONTINUED | OUTPATIENT
Start: 2018-06-19 | End: 2018-06-21

## 2018-06-19 RX ORDER — ONDANSETRON 2 MG/ML
INJECTION INTRAMUSCULAR; INTRAVENOUS AS NEEDED
Status: DISCONTINUED | OUTPATIENT
Start: 2018-06-19 | End: 2018-06-19 | Stop reason: SURG

## 2018-06-19 RX ORDER — ALBUTEROL SULFATE 90 UG/1
2 AEROSOL, METERED RESPIRATORY (INHALATION) EVERY 4 HOURS PRN
Status: DISCONTINUED | OUTPATIENT
Start: 2018-06-19 | End: 2018-06-21

## 2018-06-19 RX ORDER — ONDANSETRON 2 MG/ML
4 INJECTION INTRAMUSCULAR; INTRAVENOUS EVERY 4 HOURS PRN
Status: DISCONTINUED | OUTPATIENT
Start: 2018-06-19 | End: 2018-06-21

## 2018-06-19 RX ORDER — CEFAZOLIN SODIUM/WATER 2 G/20 ML
2 SYRINGE (ML) INTRAVENOUS ONCE
Status: COMPLETED | OUTPATIENT
Start: 2018-06-19 | End: 2018-06-19

## 2018-06-19 RX ORDER — ACETAMINOPHEN 325 MG/1
650 TABLET ORAL EVERY 4 HOURS PRN
Status: DISCONTINUED | OUTPATIENT
Start: 2018-06-19 | End: 2018-06-21

## 2018-06-19 RX ORDER — HYDROCODONE BITARTRATE AND ACETAMINOPHEN 10; 325 MG/1; MG/1
1 TABLET ORAL EVERY 4 HOURS PRN
Status: DISCONTINUED | OUTPATIENT
Start: 2018-06-19 | End: 2018-06-21

## 2018-06-19 RX ORDER — HYDROMORPHONE HYDROCHLORIDE 1 MG/ML
0.2 INJECTION, SOLUTION INTRAMUSCULAR; INTRAVENOUS; SUBCUTANEOUS EVERY 5 MIN PRN
Status: DISCONTINUED | OUTPATIENT
Start: 2018-06-19 | End: 2018-06-19 | Stop reason: HOSPADM

## 2018-06-19 RX ORDER — HYDROCODONE BITARTRATE AND ACETAMINOPHEN 5; 325 MG/1; MG/1
1 TABLET ORAL AS NEEDED
Status: DISCONTINUED | OUTPATIENT
Start: 2018-06-19 | End: 2018-06-19 | Stop reason: HOSPADM

## 2018-06-19 RX ORDER — BISACODYL 10 MG
10 SUPPOSITORY, RECTAL RECTAL
Status: DISCONTINUED | OUTPATIENT
Start: 2018-06-19 | End: 2018-06-21

## 2018-06-19 RX ORDER — DIPHENHYDRAMINE HYDROCHLORIDE 50 MG/ML
12.5 INJECTION INTRAMUSCULAR; INTRAVENOUS EVERY 4 HOURS PRN
Status: DISCONTINUED | OUTPATIENT
Start: 2018-06-19 | End: 2018-06-21

## 2018-06-19 RX ORDER — GABAPENTIN 300 MG/1
600 CAPSULE ORAL ONCE
Status: COMPLETED | OUTPATIENT
Start: 2018-06-19 | End: 2018-06-19

## 2018-06-19 RX ORDER — CELECOXIB 200 MG/1
200 CAPSULE ORAL ONCE
Status: COMPLETED | OUTPATIENT
Start: 2018-06-19 | End: 2018-06-19

## 2018-06-19 RX ORDER — ONDANSETRON 2 MG/ML
4 INJECTION INTRAMUSCULAR; INTRAVENOUS ONCE AS NEEDED
Status: DISCONTINUED | OUTPATIENT
Start: 2018-06-19 | End: 2018-06-19 | Stop reason: HOSPADM

## 2018-06-19 RX ORDER — GABAPENTIN 300 MG/1
300 CAPSULE ORAL NIGHTLY
Status: DISCONTINUED | OUTPATIENT
Start: 2018-06-19 | End: 2018-06-21

## 2018-06-19 RX ORDER — DOCUSATE SODIUM 100 MG/1
100 CAPSULE, LIQUID FILLED ORAL 2 TIMES DAILY
Status: DISCONTINUED | OUTPATIENT
Start: 2018-06-19 | End: 2018-06-21

## 2018-06-19 RX ORDER — HYDROCODONE BITARTRATE AND ACETAMINOPHEN 10; 325 MG/1; MG/1
1-2 TABLET ORAL EVERY 6 HOURS PRN
Qty: 60 TABLET | Refills: 0 | Status: SHIPPED | OUTPATIENT
Start: 2018-06-19 | End: 2018-07-10

## 2018-06-19 RX ADMIN — SODIUM CHLORIDE, SODIUM LACTATE, POTASSIUM CHLORIDE, CALCIUM CHLORIDE: 600; 310; 30; 20 INJECTION, SOLUTION INTRAVENOUS at 16:43:00

## 2018-06-19 RX ADMIN — SODIUM CHLORIDE, SODIUM LACTATE, POTASSIUM CHLORIDE, CALCIUM CHLORIDE: 600; 310; 30; 20 INJECTION, SOLUTION INTRAVENOUS at 13:42:00

## 2018-06-19 RX ADMIN — ONDANSETRON 4 MG: 2 INJECTION INTRAMUSCULAR; INTRAVENOUS at 16:15:00

## 2018-06-19 RX ADMIN — DEXAMETHASONE SODIUM PHOSPHATE 4 MG: 4 MG/ML VIAL (ML) INJECTION at 14:11:00

## 2018-06-19 RX ADMIN — MIDAZOLAM HYDROCHLORIDE 2 MG: 1 INJECTION INTRAMUSCULAR; INTRAVENOUS at 13:42:00

## 2018-06-19 RX ADMIN — LABETALOL HYDROCHLORIDE 5 MG: 5 INJECTION, SOLUTION INTRAVENOUS at 15:15:00

## 2018-06-19 RX ADMIN — SODIUM CHLORIDE, SODIUM LACTATE, POTASSIUM CHLORIDE, CALCIUM CHLORIDE: 600; 310; 30; 20 INJECTION, SOLUTION INTRAVENOUS at 15:05:00

## 2018-06-19 RX ADMIN — CEFAZOLIN SODIUM/WATER 2 G: 2 G/20 ML SYRINGE (ML) INTRAVENOUS at 14:13:00

## 2018-06-19 NOTE — ANESTHESIA POSTPROCEDURE EVALUATION
Patient: Paulo Murphy    Procedure Summary     Date:  06/19/18 Room / Location:  [facility] OR 06 / [facility] OR    Anesthesia Start:  1593 Anesthesia Stop:  9028    Procedure:  HIP TOTAL ANTERIOR APPROACH (Left ) Diagnosis:       Arthritis of left hip

## 2018-06-19 NOTE — INTERVAL H&P NOTE
Pre-op Diagnosis: Arthritis of left hip [M16.12]    The above referenced H&P was reviewed by Ana Nunes MD on 6/19/2018, the patient was examined and no significant changes have occurred in the patient's condition since the H&P was performed.   I d

## 2018-06-19 NOTE — ANESTHESIA PREPROCEDURE EVALUATION
Anesthesia PreOp Note    HPI:     Angi Edwards is a 66year old female who presents for preoperative consultation requested by:  Chapis Hill MD    Date of Surgery: 6/19/2018    Procedure(s):  HIP TOTAL ANTERIOR APPROACH  Indication: Arthritis o Montelukast Sodium (SINGULAIR) 10 MG Oral Tab Take 1 tablet (10 mg total) by mouth nightly.  (Patient taking differently: Take 10 mg by mouth nightly.  ) Disp: 90 tablet Rfl: 1 6/15/2018   Albuterol Sulfate  (90 Base) MCG/ACT Inhalation Aero Soln I tobacco: Never Used    Alcohol use No    Comment: occasionally    Drug use: No    Sexual activity: Not on file     Other Topics Concern    Currently spends a great deal of time in the sun No    Past Sunlamp Treatments for Acne No    History of tanning No Cardiovascular - negative ROS and normal exam  Exercise tolerance: good  (+) hypertension well controlled,     Neuro/Psych - negative ROS     GI/Hepatic/Renal - negative ROS     Endo/Other - negative ROS   Abdominal  - normal exam             Anesthesia Pl

## 2018-06-19 NOTE — BRIEF OP NOTE
Pre-Operative Diagnosis: Arthritis of left hip [M16.12]     Post-Operative Diagnosis: Arthritis of left hip [M16.12]      Procedure Performed:   Procedure(s):  LEFT TOTAL HIP REPLACEMENT, ANTERIOR APPROACH    Surgeon(s) and Role:     * Paulo Valencia,

## 2018-06-19 NOTE — TELEPHONE ENCOUNTER
Advised patient of Dr. Silvano Daigle notes. Patient verbalized understanding. Notes recorded by Yury Escamilla DO on 6/18/2018 at 1:54 PM CDT  Von Willebrand factor antigen was also negative.  You will be able to proceed with surgery.   ------    Notes rec

## 2018-06-19 NOTE — ANESTHESIA PROCEDURE NOTES
Airway  Date/Time: 6/19/2018 1:59 PM  Urgency: elective    Airway not difficult    General Information and Staff    Patient location during procedure: OR  Anesthesiologist: Lee Hernandez  Resident/CRNA: Truman Glynn  Performed: anesthesiologist

## 2018-06-19 NOTE — H&P
Copy of Dr. Osmar Rodriguez preop H&P from June 6. Elevated PTT worked up. Unremarkable at this time.       Reason for Visit     Pre-Op Exam left hip surgery on 06-19-18   Reason for Visit History   Addendum Note        Addended by: Aileen Rivas on: 6/18/2018 05/30/2015   EOS 0.3 05/30/2015   BASO 0.0 05/30/2015   NEPERCENT 61 04/23/2018   LYPERCENT 29 04/23/2018   MOPERCENT 6 04/23/2018   EOPERCENT 4 04/23/2018   BAPERCENT 1 04/23/2018   NE 5.1 04/23/2018   LYMABS 2.4 04/23/2018   MOABSO 0.5 04/23/2018   EOABS TRIG 51 04/23/2018   HDL 77 04/23/2018    (H) 04/23/2018   NONHDLC 158 (H) 04/23/2018   CALCNONHDL 168 (H) 05/30/2015   CALCNONHDL 192 (H) 07/24/2014             TSH (uIU/mL)   Date Value   04/23/2018 1.84   ----------      TSH (S) (uIU/mL)   Date       Past Surgical History:  06-: ELECTROCARDIOGRAM, COMPLETE      Comment: Scanned to Media Tab - Date of Service                06-  No date: OTHER SURGICAL HISTORY      Comment: laporoscopy  No date: OTHER SURGICAL HISTORY      Comment: (SINGULAIR) 10 MG Oral Tab Take 1 tablet (10 mg total) by mouth nightly.  Disp: 90 tablet Rfl: 1   Albuterol Sulfate  (90 Base) MCG/ACT Inhalation Aero Soln Inhale 2 puffs into the lungs every 4 (four) hours as needed for Wheezing or Shortness of [place] 6/15/2018 6/15/2018           3:21 PM  3:21 PM   Lupus Anticoag Screen Interp       Conclusion: Negative . . .      PT      11.8 - 14.5 seconds 13.7     APTT      23.2 - 35.3 seconds 39.7 (H) 39.7 (H)   Hexagonal Phase Staclot LA      Negative Negative

## 2018-06-19 NOTE — PROGRESS NOTES
[facility]    Progress Note    Julian Knowles Patient Status:  Surgery Admit    6/15/1960 MRN [id number]   Location [address] [facility] Attending Parish Jones MD   Hosp Day # 0 PCP [address] [place] [address],      Kasey Bhatt WBC 8.3 04/23/2018   HGB 14.0 04/23/2018   HCT 42.2 04/23/2018    04/23/2018   CREATSERUM 0.88 06/07/2018   BUN 15 06/07/2018    06/07/2018   K 3.8 06/07/2018    06/07/2018   CO2 28 06/07/2018   GLU 93 06/07/2018   CA 9.6 06/07/2018

## 2018-06-20 PROCEDURE — 99232 SBSQ HOSP IP/OBS MODERATE 35: CPT | Performed by: HOSPITALIST

## 2018-06-20 RX ORDER — 0.9 % SODIUM CHLORIDE 0.9 %
VIAL (ML) INJECTION
Status: COMPLETED
Start: 2018-06-20 | End: 2018-06-20

## 2018-06-20 RX ORDER — PSEUDOEPHEDRINE HCL 30 MG
100 TABLET ORAL 2 TIMES DAILY
Qty: 60 CAPSULE | Refills: 0 | Status: SHIPPED
Start: 2018-06-20 | End: 2018-07-02 | Stop reason: CLARIF

## 2018-06-20 NOTE — CM/SW NOTE
DE met with the pt. At bedside. The pt. Lives with her  and dtr in a split level home with 6 stairs between levels. The pt. Reports being independent prior to admission with adls, ambulaiton and driving. The pt.  Is planning to return home when me

## 2018-06-20 NOTE — PHYSICAL THERAPY NOTE
PHYSICAL THERAPY HIP TREATMENT NOTE - INPATIENT    Room Number: 423/423-A       Number of Visits to Meet Established Goals: 8    Presenting Problem:  (L Ant TRAVIS)    Problem List  Principal Problem:    Primary osteoarthritis of left hip  Active Problems: chair (including a wheelchair)?: A Little   -   Need to walk in hospital room?: A Little   -   Climbing 3-5 steps with a railing?: A Little     AM-PAC Score:  Raw Score: 20   PT Approx Degree of Impairment Score: 35.83%   Standardized Score (AM-PAC Scale):

## 2018-06-20 NOTE — DISCHARGE SUMMARY
[facility]  Discharge Summary     Mo Rodrigues  : 6/15/1960    Status: Inpatient  Day #: 1    Attending: Jhonatan Hill MD  PCP: Didier Funez DO     Date of Admission: 2018  Date of Discharge: 2018     Hospital Discharge Diagn celecoxib 200 MG Caps  Commonly known as:  CELEBREX      Take 1 capsule (200 mg total) by mouth 2 (two) times daily.    Quantity:  30 capsule  Refills:  0     docusate sodium 100 MG Caps  Commonly known as:  COLACE      Take 100 mg by mouth 2 (two) times Low Risk. TCM Follow-Up Recommendation:  LACE < 29: Low Risk of readmission after discharge from the hospital. No TCM follow-up needed. I spent >30 minutes on this discharge, counseling patient and discussing discharge plans.  All questions answer

## 2018-06-20 NOTE — OPERATIVE REPORT
[facility]    PATIENT'S NAME: Shandra Spicer   ATTENDING PHYSICIAN: Dustin Garland MD   OPERATING PHYSICIAN: Phebe Bernheim, MD   PATIENT ACCOUNT#:   [id number]    LOCATION:  [address] [place] [address] RECORD #:   [id number]       DATE OF BIRTH expressed full understanding of my explanations and plan of care. Informed consent was obtained. FINDINGS:  Left hip degenerative joint disease with femoral head flattening, osteophytes, joint space narrowing, and labral tearing.     PROCEDURE:  The pa found good canal fit, stability, leg length, and offset with a standard neck and a +1.5, 36 mm trial femoral head. We removed all trial instrumentation from the proximal femur. The true instrumentation was placed and the hip reduced.   We closed the capsu

## 2018-06-20 NOTE — PHYSICAL THERAPY NOTE
PHYSICAL THERAPY HIP EVALUATION - INPATIENT     Room Number: 423/423-A  Evaluation Date: 6/20/2018  Type of Evaluation: Initial  Physician Order: PT Eval and Treat    Presenting Problem:  (L Ant TRAVIS)  Reason for Therapy: Mobility Dysfunction and Discharge pilonidal cyst X2`  No date: SKIN SURGERY      Comment: several cyst excision   No date: TONSILLECTOMY    HOME SITUATION      Home Layout: Multi-level  Stairs to Enter :  (0)     Stairs to Bedroom:  (6)  Railing: Yes       Drives: Yes  Patient Owned Equipm Assessment   Gait Assistance: Supervision  Distance (ft):  (250)  Assistive Device: Rolling walker        Comment : L heel wip    Bed Mobility: SBA    Transfers: SBA    Exercise/Education Provided:  Bed mobility  Gait training  Strengthening  Lower therape

## 2018-06-20 NOTE — PROGRESS NOTES
Jigna Benavidez Patient Status:  Inpatient    6/15/1960 MRN [id number]   Location [facility] 4W/SW/SE Attending Roxann Smith MD   Hosp Day # 1 PCP Capo Willis DO     SUBJECTIVE:  Interval History: Patient has no current complaints.   Pain: 3

## 2018-06-20 NOTE — PROGRESS NOTES
[facility]  Progress Note     Pauline Bobby  : 6/15/1960    Status: Inpatient  Day #: 1    Attending: Anna Townsend MD  PCP: Porsche East,       Assessment and Plan     Primary osteoarthritis of the left hip  -s/p left hip anterior rubio View) Unilateral Em    Result Date: 6/19/2018  CONCLUSION:  1. Status post left hip arthroplasty.      Dictated by (CST): Vivi Fleming MD on 6/19/2018 at 16:39     Approved by (CST): Blossom Pabon MD on 6/19/2018 at 16:40          Xr Hi

## 2018-06-20 NOTE — OCCUPATIONAL THERAPY NOTE
OCCUPATIONAL THERAPY EVALUATION - INPATIENT      Room Number: 423/423-A  Evaluation Date: 6/20/2018  Type of Evaluation: Initial       Physician Order: IP Consult to Occupational Therapy  Reason for Therapy: ADL/IADL Dysfunction and Discharge Planning    O chin\"   • Dermatological disorder     per [employer]:  \"removal of cyst under left arm\"   • High blood pressure    • Osteoarthritis    • Pilonidal cyst    • Unspecified essential hypertension        Past Surgical History  Past Surgical History:  06- currently need…  -   Putting on and taking off regular lower body clothing?: A Little  -   Bathing (including washing, rinsing, drying)?: A Little  -   Toileting, which includes using toilet, bedpan or urinal? : None  -   Putting on and taking off regular

## 2018-06-21 VITALS
DIASTOLIC BLOOD PRESSURE: 69 MMHG | RESPIRATION RATE: 18 BRPM | TEMPERATURE: 98 F | HEIGHT: 67 IN | BODY MASS INDEX: 32.33 KG/M2 | OXYGEN SATURATION: 98 % | SYSTOLIC BLOOD PRESSURE: 119 MMHG | WEIGHT: 206 LBS | HEART RATE: 97 BPM

## 2018-06-21 PROCEDURE — 99239 HOSP IP/OBS DSCHRG MGMT >30: CPT | Performed by: HOSPITALIST

## 2018-06-21 NOTE — PHYSICAL THERAPY NOTE
PHYSICAL THERAPY HIP TREATMENT NOTE - INPATIENT    Room Number: 423/423-A       Number of Visits to Meet Established Goals: 8    Presenting Problem:  (L Ant TRAVIS)    Problem List  Principal Problem:    Primary osteoarthritis of left hip  Active Problems: commode, etc.): None   -   Moving from lying on back to sitting on the side of the bed?: None   How much help from another person does the patient currently need. ..   -   Moving to and from a bed to a chair (including a wheelchair)?: None   -   Need to wal precautions and safety concerns independently   Goal #5   Current Status In progress   Goal #6 Patient independently performs home exercise program for ROM/strengthening per the instructions provided in preparation for discharge.    Goal #6  Current Status

## 2018-06-21 NOTE — HOME CARE LIAISON
Met with patient at the bedside. Patient is agreeable to [facility]. Brochure and liaison's business card provided with contact information. All questions addressed and answered.

## 2018-06-21 NOTE — CM/SW NOTE
Plan is for discharge home today 6/21 with [facility]. [facility] is aware of discharge and [facility] orders.        [facility] ext [phone]

## 2018-06-22 ENCOUNTER — TELEPHONE (OUTPATIENT)
Dept: ORTHOPEDICS CLINIC | Facility: CLINIC | Age: 58
End: 2018-06-22

## 2018-06-22 ENCOUNTER — TELEPHONE (OUTPATIENT)
Dept: FAMILY MEDICINE CLINIC | Facility: CLINIC | Age: 58
End: 2018-06-22

## 2018-06-22 RX ORDER — ONDANSETRON HYDROCHLORIDE 8 MG/1
8 TABLET, FILM COATED ORAL EVERY 8 HOURS PRN
Qty: 20 TABLET | Refills: 0 | Status: SHIPPED | OUTPATIENT
Start: 2018-06-22 | End: 2018-06-29

## 2018-06-22 RX ORDER — ONDANSETRON 4 MG/1
4 TABLET, FILM COATED ORAL EVERY 8 HOURS PRN
Qty: 20 TABLET | Refills: 0 | Status: SHIPPED | OUTPATIENT
Start: 2018-06-22 | End: 2018-11-20

## 2018-06-22 NOTE — TELEPHONE ENCOUNTER
Felecia anna [facility] ci wene to see patient today and patient is having lots of nausea and constant belching and is uncomfortable. Felecia is requesting if something can be prescribed.      Please advise

## 2018-06-22 NOTE — TELEPHONE ENCOUNTER
S/w pt and she states nausea started 6/21/18 evening. She denies any emesis or any other complaints. She admits she hasn't been eating much due to nausea. I did advise her that the Nabil Cage can cause some nausea and she should try to take with food.  Advised h

## 2018-06-22 NOTE — TELEPHONE ENCOUNTER
[facility] Felecia on Dr. Cortney Mack script. Nurse verbalized understanding. She said that she'll speak with patient and family about the 2 scripts--4 mg and 8 mg--and see which one patient wants to fill.

## 2018-06-22 NOTE — TELEPHONE ENCOUNTER
Called Patient and offered and appointment for a Hospital Follow up, Patient declined and stated she will see Dr Nabil Fox this coming week,for post op.

## 2018-06-22 NOTE — TELEPHONE ENCOUNTER
Felecia WEBER RN calling. pt is 6/19 post op hip replacement. Asking for rx to help with nausea. Thank you.

## 2018-06-28 ENCOUNTER — OFFICE VISIT (OUTPATIENT)
Dept: ORTHOPEDICS CLINIC | Facility: CLINIC | Age: 58
End: 2018-06-28

## 2018-06-28 ENCOUNTER — TELEPHONE (OUTPATIENT)
Dept: ORTHOPEDICS CLINIC | Facility: CLINIC | Age: 58
End: 2018-06-28

## 2018-06-28 DIAGNOSIS — Z96.642 S/P HIP REPLACEMENT, LEFT: Primary | ICD-10-CM

## 2018-06-28 PROCEDURE — 99024 POSTOP FOLLOW-UP VISIT: CPT | Performed by: ORTHOPAEDIC SURGERY

## 2018-06-28 PROCEDURE — 99212 OFFICE O/P EST SF 10 MIN: CPT | Performed by: ORTHOPAEDIC SURGERY

## 2018-06-28 NOTE — TELEPHONE ENCOUNTER
Pt had appt today - forgot to ask if she still needs to take celebrex and asprin     Asking if she needs referral for outpt PT

## 2018-06-28 NOTE — PROGRESS NOTES
NURSING INTAKE COMMENTS: Patient presents with:  Post-Op: Pt is here for a left total hip Surgery on 6-20-18. Pt's s1st post operative Pain scale 1-2. Pt has not taken pain medication for  1 day. Pt using walker.  Therapy in her home      Patient presents 1

## 2018-06-28 NOTE — TELEPHONE ENCOUNTER
Pt notified of VT message and that ref may take wk or so to process. It should be sent via [employer] once approved.

## 2018-06-29 ENCOUNTER — TELEPHONE (OUTPATIENT)
Dept: ORTHOPEDICS CLINIC | Facility: CLINIC | Age: 58
End: 2018-06-29

## 2018-06-29 NOTE — TELEPHONE ENCOUNTER
Pt asking if her inpatient PT appt for Monday will still be covered or should she cancel it - because  is having her start outpt PT soon

## 2018-07-02 ENCOUNTER — OFFICE VISIT (OUTPATIENT)
Dept: ORTHOPEDICS CLINIC | Facility: CLINIC | Age: 58
End: 2018-07-02

## 2018-07-02 ENCOUNTER — TELEPHONE (OUTPATIENT)
Dept: ORTHOPEDICS CLINIC | Facility: CLINIC | Age: 58
End: 2018-07-02

## 2018-07-02 DIAGNOSIS — Z96.642 S/P HIP REPLACEMENT, LEFT: Primary | ICD-10-CM

## 2018-07-02 PROCEDURE — 99212 OFFICE O/P EST SF 10 MIN: CPT | Performed by: ORTHOPAEDIC SURGERY

## 2018-07-02 PROCEDURE — 99024 POSTOP FOLLOW-UP VISIT: CPT | Performed by: ORTHOPAEDIC SURGERY

## 2018-07-02 NOTE — TELEPHONE ENCOUNTER
RN calling to inform that the Pt. has low grade fever of 99.6 and pt. has drainage at top of hip area. RN states that the hip area is covered and that the pt. Is sched to come in today 7/2, for her post op appt.  At 1:00pm. I tried to reach RN, but not kesha

## 2018-07-02 NOTE — TELEPHONE ENCOUNTER
S/w [place] RN- she states pt had temp of 99.6 today and pt reported d/c at top of incision, but RN did not remove bandage. She reports increased pain today.  Pt has appt today @ 120pm. CLOTILDE HOWARD

## 2018-07-02 NOTE — PROGRESS NOTES
Patient presents for follow-up. Since I last saw her, she had increased drainage from her wound over the weekend. The more she walks the more the wound drains. It is relatively okay when she is lying down. She did have a low level fever of 99.6 today.

## 2018-07-03 ENCOUNTER — ANESTHESIA (OUTPATIENT)
Dept: SURGERY | Facility: HOSPITAL | Age: 58
DRG: 464 | End: 2018-07-03
Payer: COMMERCIAL

## 2018-07-03 ENCOUNTER — APPOINTMENT (OUTPATIENT)
Dept: GENERAL RADIOLOGY | Facility: HOSPITAL | Age: 58
DRG: 464 | End: 2018-07-03
Attending: ORTHOPAEDIC SURGERY
Payer: COMMERCIAL

## 2018-07-03 ENCOUNTER — ANESTHESIA EVENT (OUTPATIENT)
Dept: SURGERY | Facility: HOSPITAL | Age: 58
DRG: 464 | End: 2018-07-03
Payer: COMMERCIAL

## 2018-07-03 ENCOUNTER — HOSPITAL ENCOUNTER (INPATIENT)
Facility: HOSPITAL | Age: 58
LOS: 4 days | Discharge: NURSING, REHAB OR LONG-TERM CARE | DRG: 464 | End: 2018-07-07
Attending: ORTHOPAEDIC SURGERY | Admitting: ORTHOPAEDIC SURGERY
Payer: COMMERCIAL

## 2018-07-03 ENCOUNTER — SURGERY (OUTPATIENT)
Age: 58
End: 2018-07-03

## 2018-07-03 DIAGNOSIS — M25.559 HIP PAIN: ICD-10-CM

## 2018-07-03 PROBLEM — M96.842 POSTOPERATIVE SEROMA OF MUSCULOSKELETAL STRUCTURE AFTER MUSCULOSKELETAL PROCEDURE: Status: ACTIVE | Noted: 2018-07-03

## 2018-07-03 PROCEDURE — 73501 X-RAY EXAM HIP UNI 1 VIEW: CPT | Performed by: ORTHOPAEDIC SURGERY

## 2018-07-03 PROCEDURE — 0SPB0JZ REMOVAL OF SYNTHETIC SUBSTITUTE FROM LEFT HIP JOINT, OPEN APPROACH: ICD-10-PCS | Performed by: ORTHOPAEDIC SURGERY

## 2018-07-03 PROCEDURE — 76001 XR C-ARM FLUORO >1 HOUR  (CPT=76001): CPT | Performed by: ORTHOPAEDIC SURGERY

## 2018-07-03 PROCEDURE — 0SHB08Z INSERTION OF SPACER INTO LEFT HIP JOINT, OPEN APPROACH: ICD-10-PCS | Performed by: ORTHOPAEDIC SURGERY

## 2018-07-03 DEVICE — BONE CEMENT HI VISCOSITY R: Type: IMPLANTABLE DEVICE | Site: HIP | Status: FUNCTIONAL

## 2018-07-03 DEVICE — IMPLANTABLE DEVICE: Type: IMPLANTABLE DEVICE | Site: HIP | Status: FUNCTIONAL

## 2018-07-03 RX ORDER — BISACODYL 10 MG
10 SUPPOSITORY, RECTAL RECTAL
Status: DISCONTINUED | OUTPATIENT
Start: 2018-07-03 | End: 2018-07-07

## 2018-07-03 RX ORDER — GLYCOPYRROLATE 0.2 MG/ML
INJECTION, SOLUTION INTRAMUSCULAR; INTRAVENOUS AS NEEDED
Status: DISCONTINUED | OUTPATIENT
Start: 2018-07-03 | End: 2018-07-03 | Stop reason: SURG

## 2018-07-03 RX ORDER — METOCLOPRAMIDE HYDROCHLORIDE 5 MG/ML
10 INJECTION INTRAMUSCULAR; INTRAVENOUS EVERY 6 HOURS PRN
Status: DISPENSED | OUTPATIENT
Start: 2018-07-03 | End: 2018-07-05

## 2018-07-03 RX ORDER — ERGOCALCIFEROL 1.25 MG/1
50000 CAPSULE ORAL WEEKLY
Status: DISCONTINUED | OUTPATIENT
Start: 2018-07-09 | End: 2018-07-07

## 2018-07-03 RX ORDER — DIPHENHYDRAMINE HYDROCHLORIDE 50 MG/ML
25 INJECTION INTRAMUSCULAR; INTRAVENOUS ONCE AS NEEDED
Status: ACTIVE | OUTPATIENT
Start: 2018-07-03 | End: 2018-07-03

## 2018-07-03 RX ORDER — SODIUM CHLORIDE 0.9 % (FLUSH) 0.9 %
10 SYRINGE (ML) INJECTION AS NEEDED
Status: DISCONTINUED | OUTPATIENT
Start: 2018-07-03 | End: 2018-07-07

## 2018-07-03 RX ORDER — HYDROCODONE BITARTRATE AND ACETAMINOPHEN 10; 325 MG/1; MG/1
2 TABLET ORAL EVERY 4 HOURS PRN
Status: DISCONTINUED | OUTPATIENT
Start: 2018-07-03 | End: 2018-07-04

## 2018-07-03 RX ORDER — DEXTROSE, SODIUM CHLORIDE, AND POTASSIUM CHLORIDE 5; .45; .15 G/100ML; G/100ML; G/100ML
INJECTION INTRAVENOUS CONTINUOUS
Status: DISCONTINUED | OUTPATIENT
Start: 2018-07-03 | End: 2018-07-07

## 2018-07-03 RX ORDER — ACETAMINOPHEN 500 MG
1000 TABLET ORAL ONCE
Status: COMPLETED | OUTPATIENT
Start: 2018-07-03 | End: 2018-07-03

## 2018-07-03 RX ORDER — NALOXONE HYDROCHLORIDE 0.4 MG/ML
80 INJECTION, SOLUTION INTRAMUSCULAR; INTRAVENOUS; SUBCUTANEOUS AS NEEDED
Status: DISCONTINUED | OUTPATIENT
Start: 2018-07-03 | End: 2018-07-03 | Stop reason: HOSPADM

## 2018-07-03 RX ORDER — MONTELUKAST SODIUM 10 MG/1
10 TABLET ORAL NIGHTLY
Status: DISCONTINUED | OUTPATIENT
Start: 2018-07-03 | End: 2018-07-06

## 2018-07-03 RX ORDER — DIPHENHYDRAMINE HYDROCHLORIDE 50 MG/ML
12.5 INJECTION INTRAMUSCULAR; INTRAVENOUS EVERY 4 HOURS PRN
Status: DISCONTINUED | OUTPATIENT
Start: 2018-07-03 | End: 2018-07-07

## 2018-07-03 RX ORDER — CEFAZOLIN SODIUM 1 G/3ML
INJECTION, POWDER, FOR SOLUTION INTRAMUSCULAR; INTRAVENOUS AS NEEDED
Status: DISCONTINUED | OUTPATIENT
Start: 2018-07-03 | End: 2018-07-03 | Stop reason: SURG

## 2018-07-03 RX ORDER — PHENYLEPHRINE HCL 10 MG/ML
VIAL (ML) INJECTION AS NEEDED
Status: DISCONTINUED | OUTPATIENT
Start: 2018-07-03 | End: 2018-07-03 | Stop reason: SURG

## 2018-07-03 RX ORDER — CELECOXIB 200 MG/1
200 CAPSULE ORAL DAILY
Status: DISCONTINUED | OUTPATIENT
Start: 2018-07-03 | End: 2018-07-04

## 2018-07-03 RX ORDER — HYDROCODONE BITARTRATE AND ACETAMINOPHEN 5; 325 MG/1; MG/1
1 TABLET ORAL AS NEEDED
Status: DISCONTINUED | OUTPATIENT
Start: 2018-07-03 | End: 2018-07-03 | Stop reason: HOSPADM

## 2018-07-03 RX ORDER — ALBUTEROL SULFATE 90 UG/1
2 AEROSOL, METERED RESPIRATORY (INHALATION) EVERY 4 HOURS PRN
Status: DISCONTINUED | OUTPATIENT
Start: 2018-07-03 | End: 2018-07-07

## 2018-07-03 RX ORDER — SODIUM CHLORIDE, SODIUM LACTATE, POTASSIUM CHLORIDE, CALCIUM CHLORIDE 600; 310; 30; 20 MG/100ML; MG/100ML; MG/100ML; MG/100ML
INJECTION, SOLUTION INTRAVENOUS CONTINUOUS
Status: DISCONTINUED | OUTPATIENT
Start: 2018-07-03 | End: 2018-07-03 | Stop reason: HOSPADM

## 2018-07-03 RX ORDER — HYDROMORPHONE HYDROCHLORIDE 1 MG/ML
0.6 INJECTION, SOLUTION INTRAMUSCULAR; INTRAVENOUS; SUBCUTANEOUS EVERY 5 MIN PRN
Status: DISCONTINUED | OUTPATIENT
Start: 2018-07-03 | End: 2018-07-03 | Stop reason: HOSPADM

## 2018-07-03 RX ORDER — MIDAZOLAM HYDROCHLORIDE 1 MG/ML
INJECTION INTRAMUSCULAR; INTRAVENOUS AS NEEDED
Status: DISCONTINUED | OUTPATIENT
Start: 2018-07-03 | End: 2018-07-03 | Stop reason: SURG

## 2018-07-03 RX ORDER — DEXAMETHASONE SODIUM PHOSPHATE 4 MG/ML
VIAL (ML) INJECTION AS NEEDED
Status: DISCONTINUED | OUTPATIENT
Start: 2018-07-03 | End: 2018-07-03 | Stop reason: SURG

## 2018-07-03 RX ORDER — FAMOTIDINE 20 MG/1
20 TABLET ORAL ONCE
Status: COMPLETED | OUTPATIENT
Start: 2018-07-03 | End: 2018-07-03

## 2018-07-03 RX ORDER — LOSARTAN POTASSIUM AND HYDROCHLOROTHIAZIDE 25; 100 MG/1; MG/1
1 TABLET ORAL
Status: DISCONTINUED | OUTPATIENT
Start: 2018-07-03 | End: 2018-07-03

## 2018-07-03 RX ORDER — HYDROMORPHONE HYDROCHLORIDE 1 MG/ML
0.2 INJECTION, SOLUTION INTRAMUSCULAR; INTRAVENOUS; SUBCUTANEOUS EVERY 2 HOUR PRN
Status: DISCONTINUED | OUTPATIENT
Start: 2018-07-03 | End: 2018-07-07

## 2018-07-03 RX ORDER — HYDROCODONE BITARTRATE AND ACETAMINOPHEN 10; 325 MG/1; MG/1
1 TABLET ORAL EVERY 4 HOURS PRN
Status: DISCONTINUED | OUTPATIENT
Start: 2018-07-03 | End: 2018-07-04

## 2018-07-03 RX ORDER — HYDROMORPHONE HYDROCHLORIDE 1 MG/ML
0.8 INJECTION, SOLUTION INTRAMUSCULAR; INTRAVENOUS; SUBCUTANEOUS EVERY 2 HOUR PRN
Status: DISCONTINUED | OUTPATIENT
Start: 2018-07-03 | End: 2018-07-07

## 2018-07-03 RX ORDER — HALOPERIDOL 5 MG/ML
0.25 INJECTION INTRAMUSCULAR ONCE AS NEEDED
Status: DISCONTINUED | OUTPATIENT
Start: 2018-07-03 | End: 2018-07-03 | Stop reason: HOSPADM

## 2018-07-03 RX ORDER — ONDANSETRON 2 MG/ML
4 INJECTION INTRAMUSCULAR; INTRAVENOUS ONCE AS NEEDED
Status: COMPLETED | OUTPATIENT
Start: 2018-07-03 | End: 2018-07-03

## 2018-07-03 RX ORDER — DOCUSATE SODIUM 100 MG/1
100 CAPSULE, LIQUID FILLED ORAL 2 TIMES DAILY
Status: DISCONTINUED | OUTPATIENT
Start: 2018-07-03 | End: 2018-07-07

## 2018-07-03 RX ORDER — DIPHENHYDRAMINE HCL 25 MG
25 CAPSULE ORAL EVERY 4 HOURS PRN
Status: DISCONTINUED | OUTPATIENT
Start: 2018-07-03 | End: 2018-07-07

## 2018-07-03 RX ORDER — MAGNESIUM HYDROXIDE 1200 MG/15ML
LIQUID ORAL CONTINUOUS PRN
Status: DISCONTINUED | OUTPATIENT
Start: 2018-07-03 | End: 2018-07-03

## 2018-07-03 RX ORDER — ONDANSETRON 2 MG/ML
4 INJECTION INTRAMUSCULAR; INTRAVENOUS EVERY 4 HOURS PRN
Status: DISPENSED | OUTPATIENT
Start: 2018-07-03 | End: 2018-07-05

## 2018-07-03 RX ORDER — HYDROCODONE BITARTRATE AND ACETAMINOPHEN 5; 325 MG/1; MG/1
2 TABLET ORAL AS NEEDED
Status: DISCONTINUED | OUTPATIENT
Start: 2018-07-03 | End: 2018-07-03 | Stop reason: HOSPADM

## 2018-07-03 RX ORDER — ONDANSETRON 2 MG/ML
INJECTION INTRAMUSCULAR; INTRAVENOUS AS NEEDED
Status: DISCONTINUED | OUTPATIENT
Start: 2018-07-03 | End: 2018-07-03 | Stop reason: SURG

## 2018-07-03 RX ORDER — ZOLPIDEM TARTRATE 5 MG/1
5 TABLET ORAL NIGHTLY PRN
Status: DISCONTINUED | OUTPATIENT
Start: 2018-07-03 | End: 2018-07-07

## 2018-07-03 RX ORDER — HYDROMORPHONE HYDROCHLORIDE 1 MG/ML
0.4 INJECTION, SOLUTION INTRAMUSCULAR; INTRAVENOUS; SUBCUTANEOUS EVERY 2 HOUR PRN
Status: DISCONTINUED | OUTPATIENT
Start: 2018-07-03 | End: 2018-07-07

## 2018-07-03 RX ORDER — SODIUM PHOSPHATE, DIBASIC AND SODIUM PHOSPHATE, MONOBASIC 7; 19 G/133ML; G/133ML
1 ENEMA RECTAL ONCE AS NEEDED
Status: DISCONTINUED | OUTPATIENT
Start: 2018-07-03 | End: 2018-07-07

## 2018-07-03 RX ORDER — SENNOSIDES 8.6 MG
17.2 TABLET ORAL NIGHTLY
Status: DISCONTINUED | OUTPATIENT
Start: 2018-07-03 | End: 2018-07-07

## 2018-07-03 RX ORDER — LIDOCAINE HYDROCHLORIDE 10 MG/ML
INJECTION, SOLUTION EPIDURAL; INFILTRATION; INTRACAUDAL; PERINEURAL AS NEEDED
Status: DISCONTINUED | OUTPATIENT
Start: 2018-07-03 | End: 2018-07-03 | Stop reason: SURG

## 2018-07-03 RX ORDER — METOCLOPRAMIDE 10 MG/1
10 TABLET ORAL ONCE
Status: COMPLETED | OUTPATIENT
Start: 2018-07-03 | End: 2018-07-03

## 2018-07-03 RX ORDER — TOBRAMYCIN 1.2 G/30ML
INJECTION, POWDER, LYOPHILIZED, FOR SOLUTION INTRAVENOUS AS NEEDED
Status: DISCONTINUED | OUTPATIENT
Start: 2018-07-03 | End: 2018-07-03

## 2018-07-03 RX ORDER — POLYETHYLENE GLYCOL 3350 17 G/17G
17 POWDER, FOR SOLUTION ORAL DAILY PRN
Status: DISCONTINUED | OUTPATIENT
Start: 2018-07-03 | End: 2018-07-07

## 2018-07-03 RX ORDER — HYDROMORPHONE HYDROCHLORIDE 1 MG/ML
0.2 INJECTION, SOLUTION INTRAMUSCULAR; INTRAVENOUS; SUBCUTANEOUS EVERY 5 MIN PRN
Status: DISCONTINUED | OUTPATIENT
Start: 2018-07-03 | End: 2018-07-03 | Stop reason: HOSPADM

## 2018-07-03 RX ORDER — HYDROMORPHONE HYDROCHLORIDE 1 MG/ML
0.4 INJECTION, SOLUTION INTRAMUSCULAR; INTRAVENOUS; SUBCUTANEOUS EVERY 5 MIN PRN
Status: DISCONTINUED | OUTPATIENT
Start: 2018-07-03 | End: 2018-07-03 | Stop reason: HOSPADM

## 2018-07-03 RX ORDER — SODIUM CHLORIDE, SODIUM LACTATE, POTASSIUM CHLORIDE, CALCIUM CHLORIDE 600; 310; 30; 20 MG/100ML; MG/100ML; MG/100ML; MG/100ML
INJECTION, SOLUTION INTRAVENOUS CONTINUOUS
Status: DISCONTINUED | OUTPATIENT
Start: 2018-07-03 | End: 2018-07-07 | Stop reason: ALTCHOICE

## 2018-07-03 RX ORDER — ACETAMINOPHEN 325 MG/1
650 TABLET ORAL EVERY 4 HOURS PRN
Status: DISCONTINUED | OUTPATIENT
Start: 2018-07-03 | End: 2018-07-07

## 2018-07-03 RX ADMIN — PHENYLEPHRINE HCL 100 MCG: 10 MG/ML VIAL (ML) INJECTION at 14:53:00

## 2018-07-03 RX ADMIN — MIDAZOLAM HYDROCHLORIDE 2 MG: 1 INJECTION INTRAMUSCULAR; INTRAVENOUS at 12:14:00

## 2018-07-03 RX ADMIN — ONDANSETRON 4 MG: 2 INJECTION INTRAMUSCULAR; INTRAVENOUS at 12:18:00

## 2018-07-03 RX ADMIN — GLYCOPYRROLATE 0.4 MG: 0.2 INJECTION, SOLUTION INTRAMUSCULAR; INTRAVENOUS at 12:18:00

## 2018-07-03 RX ADMIN — DEXAMETHASONE SODIUM PHOSPHATE 4 MG: 4 MG/ML VIAL (ML) INJECTION at 12:18:00

## 2018-07-03 RX ADMIN — LIDOCAINE HYDROCHLORIDE 100 MG: 10 INJECTION, SOLUTION EPIDURAL; INFILTRATION; INTRACAUDAL; PERINEURAL at 12:18:00

## 2018-07-03 RX ADMIN — SODIUM CHLORIDE, SODIUM LACTATE, POTASSIUM CHLORIDE, CALCIUM CHLORIDE: 600; 310; 30; 20 INJECTION, SOLUTION INTRAVENOUS at 14:42:00

## 2018-07-03 RX ADMIN — SODIUM CHLORIDE, SODIUM LACTATE, POTASSIUM CHLORIDE, CALCIUM CHLORIDE: 600; 310; 30; 20 INJECTION, SOLUTION INTRAVENOUS at 14:27:00

## 2018-07-03 RX ADMIN — CEFAZOLIN SODIUM 2 G: 1 INJECTION, POWDER, FOR SOLUTION INTRAMUSCULAR; INTRAVENOUS at 12:54:00

## 2018-07-03 RX ADMIN — SODIUM CHLORIDE, SODIUM LACTATE, POTASSIUM CHLORIDE, CALCIUM CHLORIDE: 600; 310; 30; 20 INJECTION, SOLUTION INTRAVENOUS at 12:14:00

## 2018-07-03 NOTE — ANESTHESIA PROCEDURE NOTES
Airway  Urgency: elective      General Information and Staff    Patient location during procedure: OR  Anesthesiologist: Nati Bill  Resident/CRNA: Spring Rodrigues  Performed: CRNA     Indications and Patient Condition  Indications for airway manag

## 2018-07-03 NOTE — OPERATIVE REPORT
[facility]    PATIENT'S NAME: Rosendo Veronica   ATTENDING PHYSICIAN: Julia Fields MD   OPERATING PHYSICIAN: Julia Fields MD   PATIENT ACCOUNT#:   [id number]    LOCATION:  [place] [address]  MEDICAL RECORD #:   [id number] expressed understanding of my explanations of plan of care. Informed consent was obtained. PROCEDURE:  The patient was met in the preoperative holding area and the correct site was identified with her help.   She was then brought back to the operating r remove the polyethylene liner; however, when removing the polyethylene liner, the acetabular shell was found to be grossly loose. The acetabular shell and liner were then removed easily. There was biofilm in the acetabular region.       At this point in t place, as there seemed to be a good press fit. We took some of the cement that we mixed and placed it around the calcar to fill the proximal femur and also secure the modular head to the stem. We copiously irrigated and drained the area.   We used Aricept

## 2018-07-03 NOTE — ANESTHESIA PREPROCEDURE EVALUATION
Anesthesia PreOp Note    HPI:     Melonie Mirza is a 66year old female who presents for preoperative consultation requested by:  Charli Camacho MD    Date of Surgery: 7/3/2018    Procedure(s):  EXTREMITY LOWER IRRIGATION & DEBRIDEMENT  Indication: Tab Take 1-2 tablets by mouth every 6 (six) hours as needed. Disp: 60 tablet Rfl: 0 Not Taking   aspirin 325 MG Oral Tab Take 1 tablet (325 mg total) by mouth 2 (two) times daily.  Disp: 30 tablet Rfl: 0 Not Taking   celecoxib (CELEBREX) 200 MG Oral Cap [place] sun No    Past Sunlamp Treatments for Acne No    History of tanning No    Hx of Spending [address] of Time in [place] No    Bad sunburns in the past No    Tanning Salons in the Past Yes    Comment: 20 years ago    Hx of Radiation Treatments No    Regular use legal guardian or family member of the nature of the anesthetic plan, benefits, risks including possible dental damage if relevant, major complications, and any alternative forms of anesthetic management.    All of the patient's questions were answered to t

## 2018-07-03 NOTE — BRIEF OP NOTE
Pre-Operative Diagnosis: Left hip pain [M25.559], hip seroma     Post-Operative Diagnosis: Left hip periprosthetic infection     Procedure Performed:   Procedure(s):  left hip revision with antibiotic spacer placement    Surgeon(s) and Role:     * Sincere

## 2018-07-03 NOTE — CONSULTS
INFECTIOUS DISEASE CONSULT NOTE    Bruce Dash Patient Status:  [facility]    6/15/1960 MRN [id number]   [place] [address] Attending Helen Marrufo Father      CAD (cause of death)      reports that she quit smoking about 11 years ago. Her smoking use included Cigarettes. She has a 16.00 pack-year smoking history.  She has never used smokeless tobacco. She reports that she does not drink alcohol or use pressure or chest discomfort  RESPIRATORY:  No shortness of breath, cough or sputum. GASTROINTESTINAL:  No anorexia, nausea, vomiting or diarrhea. No abdominal pain or blood. GENITOURINARY:  No Burning on urination.    NEUROLOGICAL:  No headache  MUSCULOS early onset with surgery 6/20/18 now with OR 7/3/18 with gross purulence intra-op tracking to prosthesis s/p KAYKAY and spacer placement - OR cx pending    PLAN:    - start IV vancomycin and cefepime  - place PICC  - will need IV abx on discharge  - follow up

## 2018-07-03 NOTE — ANESTHESIA POSTPROCEDURE EVALUATION
Patient: Vazquez Meneses    Procedure Summary     Date:  07/03/18 Room / Location:  [address] [place] [address] MAIN OR [address] / [address] [place] [address] MAIN OR    Anesthesia Start:  2046 Anesthesia Stop:  8422    Procedure:  EXTREMITY LOWER IRRIGATION & DEBRIDEMENT (Left Hip) Diagnosis:       Hip pain

## 2018-07-03 NOTE — H&P
CC: Post operative drainage and seroma from a prior left total hip arthroplasty    HPI: 66yo female approximately 2 weeks post a left hip replacement with persistent drainage and increased pain.     Past Medical History:   Diagnosis Date   • Anxiety state 108 (90 Base) MCG/ACT Inhalation Aero Soln Inhale 2 puffs into the lungs every 4 (four) hours as needed for Wheezing or Shortness of Breath.  Disp: 18 g Rfl: 5   HYDROcodone-acetaminophen (NORCO)  MG Oral Tab Take 1-2 tablets by mouth every 6 (six) ho patient does live in a home with stairs. A comprehensive 10 point review of systems was completed. Pertinent positives and negatives noted in the the HPI.     Heart, lung, and abdomen are normal: The patient has good peripheral perfusion, non-labor

## 2018-07-04 LAB
ANION GAP SERPL CALC-SCNC: 3 MMOL/L (ref 0–18)
BUN SERPL-MCNC: 13 MG/DL (ref 8–20)
BUN/CREAT SERPL: 14.1 (ref 10–20)
CALCIUM SERPL-MCNC: 8 MG/DL (ref 8.5–10.5)
CHLORIDE SERPL-SCNC: 103 MMOL/L (ref 95–110)
CO2 SERPL-SCNC: 27 MMOL/L (ref 22–32)
CREAT SERPL-MCNC: 0.92 MG/DL (ref 0.5–1.5)
ERYTHROCYTE [DISTWIDTH] IN BLOOD BY AUTOMATED COUNT: 12.9 % (ref 11–15)
GLUCOSE SERPL-MCNC: 158 MG/DL (ref 70–99)
HCT VFR BLD AUTO: 24.8 % (ref 35–48)
HGB BLD-MCNC: 8 G/DL (ref 12–16)
MCH RBC QN AUTO: 30.4 PG (ref 27–32)
MCHC RBC AUTO-ENTMCNC: 32.2 G/DL (ref 32–37)
MCV RBC AUTO: 94.4 FL (ref 80–100)
OSMOLALITY UR CALC.SUM OF ELEC: 279 MOSM/KG (ref 275–295)
PLATELET # BLD AUTO: 396 K/UL (ref 140–400)
PMV BLD AUTO: 7.9 FL (ref 7.4–10.3)
POTASSIUM SERPL-SCNC: 4.1 MMOL/L (ref 3.3–5.1)
RBC # BLD AUTO: 2.62 M/UL (ref 3.7–5.4)
SODIUM SERPL-SCNC: 133 MMOL/L (ref 136–144)
WBC # BLD AUTO: 15.9 K/UL (ref 4–11)

## 2018-07-04 PROCEDURE — 99233 SBSQ HOSP IP/OBS HIGH 50: CPT | Performed by: HOSPITALIST

## 2018-07-04 RX ORDER — DIAZEPAM 5 MG/1
5 TABLET ORAL EVERY 6 HOURS PRN
Status: DISCONTINUED | OUTPATIENT
Start: 2018-07-04 | End: 2018-07-07

## 2018-07-04 RX ORDER — CYCLOBENZAPRINE HCL 10 MG
10 TABLET ORAL EVERY 8 HOURS PRN
Status: DISCONTINUED | OUTPATIENT
Start: 2018-07-04 | End: 2018-07-04

## 2018-07-04 RX ORDER — MAGNESIUM HYDROXIDE/ALUMINUM HYDROXICE/SIMETHICONE 120; 1200; 1200 MG/30ML; MG/30ML; MG/30ML
30 SUSPENSION ORAL 4 TIMES DAILY PRN
Status: DISCONTINUED | OUTPATIENT
Start: 2018-07-04 | End: 2018-07-07

## 2018-07-04 RX ORDER — KETOROLAC TROMETHAMINE 15 MG/ML
15 INJECTION, SOLUTION INTRAMUSCULAR; INTRAVENOUS EVERY 6 HOURS
Status: COMPLETED | OUTPATIENT
Start: 2018-07-04 | End: 2018-07-04

## 2018-07-04 RX ORDER — CELECOXIB 200 MG/1
200 CAPSULE ORAL DAILY
Status: DISCONTINUED | OUTPATIENT
Start: 2018-07-05 | End: 2018-07-07

## 2018-07-04 RX ORDER — OXYCODONE AND ACETAMINOPHEN 10; 325 MG/1; MG/1
1 TABLET ORAL EVERY 4 HOURS PRN
Status: DISCONTINUED | OUTPATIENT
Start: 2018-07-04 | End: 2018-07-07

## 2018-07-04 RX ORDER — OXYCODONE AND ACETAMINOPHEN 10; 325 MG/1; MG/1
2 TABLET ORAL EVERY 6 HOURS PRN
Status: DISCONTINUED | OUTPATIENT
Start: 2018-07-04 | End: 2018-07-07

## 2018-07-04 NOTE — PROGRESS NOTES
[facility] ID PROGRESS NOTE    Lenette Gain Patient Status:  Inpatient    6/15/1960 MRN [id number]   Location [facility] 4W/SW/SE Attending Bg Simeon MD   Hosp Day # 1 PCP Nico Herr DO     Subjective:  Afebrile. NAD. Awake.  Eating w were sent.   Given IV cefazolin perioperatively.     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

## 2018-07-04 NOTE — PLAN OF CARE
PAIN - ADULT    • Verbalizes/displays adequate comfort level or patient's stated pain goal Not Progressing          DISCHARGE PLANNING    • Discharge to home or other facility with appropriate resources Progressing        Patient Centered Care    • Patient

## 2018-07-04 NOTE — PROGRESS NOTES
Duong Riggs Patient Status:  Inpatient    6/15/1960 MRN [id number]   Location [facility] 4W/SW/SE Attending Ulisses Castillo MD   Hosp Day # 1 PCP Jordan Clancy,      SUBJECTIVE:  Interval History: Patient has no current complaints.

## 2018-07-04 NOTE — PROGRESS NOTES
[facility] dosing service    Initial Pharmacokinetic Consult for Vancomycin Dosing     Krysta Mcrae is a 66year old female admitted on 7/3 who is being treated for left hip infection .   Pharmacy has been asked to dose Vancomycin by Dr. Adrian Martinez dose, followed by 1 gm Q 12 hours  based upon, adjusted weight, renal function, and pharmacokinetics. 2.  Pharmacy ordered Vancomycin trough level(s) prior to 4th dose. Goal trough level 15-20 ug/mL.     3.  Pharmacy will need BUN/Scr daily while on Va

## 2018-07-04 NOTE — PHYSICAL THERAPY NOTE
PHYSICAL THERAPY HIP EVALUATION - INPATIENT     Room Number: 425/425-A  Evaluation Date: 7/4/2018  Type of Evaluation: Initial  Physician Order: PT Eval and Treat    Presenting Problem: post-op seroma of musculoskeletal structure s/ L TRAVIS with persistent d High fall risk    WEIGHT BEARING RESTRICTION  Weight Bearing Restriction: L lower extremity           L Lower Extremity: Partial Weight Bearing (30%)    PAIN ASSESSMENT  Rating: 10  Location: L hip  Management Techniques:  Activity promotion;Relaxation;Repo sets  [facility] sets  Transfer training    Patient End of Session: Up in chair;Needs met;Call light within reach;RN aware of session/findings; All patient questions and concerns addressed; Alarm set    ASSESSMENT   Patient is a 66year old female admitted on Goal #4    Patient will negotiate 6 stairs, 1 rail, min A   Goal #5    Patient verbalizes and/or demonstrates all precautions and safety concerns independently   Goal #6        Goal Comments: Goals established on 7/4/2018

## 2018-07-04 NOTE — PROGRESS NOTES
[facility]    Progress Note    Jigna Tolbert Patient Status:  Inpatient    6/15/1960 MRN [id number]   Location [facility] 4W/SW/SE Attending Gabrielle Bautista MD   Hosp Day # 1 PCP Donna Dugan DO       Subjective:   Felix Marino 7/3/2018  CONCLUSION:   Postsurgical changes of left hip arthroplasty with radiographically uncomplicated hardware.      Dictated by (CST): Anju Hale MD on 7/03/2018 at 16:11     Approved by (CST): Anju Hale MD on 7/03/2018 at 16:13          Xr

## 2018-07-04 NOTE — PROGRESS NOTES
07/04/18 1150   Clinical Encounter Type   Visited With Patient   Routine Visit Follow-up   Continue Visiting Yes   Surgical Visit Post-op   Referral From    Referral To    Patient Spiritual Encounters   Spiritual Assessment Completed Yes

## 2018-07-04 NOTE — OCCUPATIONAL THERAPY NOTE
OCCUPATIONAL THERAPY EVALUATION - INPATIENT      Room Number: 425/425-A  Evaluation Date: 7/4/2018  Type of Evaluation: Initial  Presenting Problem:  (Left Hip spacer placement )    Physician Order: IP Consult to Occupational Therapy  Reason for Therapy: A education;Patient/Family training;Neuromuscluar reeducation; Compensatory technique education       OCCUPATIONAL THERAPY MEDICAL/SOCIAL HISTORY     Problem List   Active Problems:    Postoperative seroma of musculoskeletal structure after musculoskeletal pr ACTIVITY TOLERANCE  Poor d/t anxious behavior and muscle spasm    COGNITION  Able to redirect   Alert and O x 4    RANGE OF MOTION   Upper extremity ROM is within functional limits     STRENGTH ASSESSMENT  Upper extremity strength is within functional MOD I    Comment:    Patient will independently recall global prec   Comment:         Goals  on:    Frequency:  5 x week

## 2018-07-05 ENCOUNTER — TELEPHONE (OUTPATIENT)
Dept: ORTHOPEDICS CLINIC | Facility: CLINIC | Age: 58
End: 2018-07-05

## 2018-07-05 LAB
ERYTHROCYTE [DISTWIDTH] IN BLOOD BY AUTOMATED COUNT: 12.9 % (ref 11–15)
HCT VFR BLD AUTO: 21.4 % (ref 35–48)
HGB BLD-MCNC: 7.1 G/DL (ref 12–16)
MCH RBC QN AUTO: 31.6 PG (ref 27–32)
MCHC RBC AUTO-ENTMCNC: 33.4 G/DL (ref 32–37)
MCV RBC AUTO: 94.4 FL (ref 80–100)
PLATELET # BLD AUTO: 398 K/UL (ref 140–400)
PMV BLD AUTO: 8.4 FL (ref 7.4–10.3)
RBC # BLD AUTO: 2.26 M/UL (ref 3.7–5.4)
VANCOMYCIN TROUGH SERPL-MCNC: 17.2 MCG/ML (ref 10–20)
WBC # BLD AUTO: 9.4 K/UL (ref 4–11)

## 2018-07-05 PROCEDURE — 99232 SBSQ HOSP IP/OBS MODERATE 35: CPT | Performed by: HOSPITALIST

## 2018-07-05 PROCEDURE — 02HV33Z INSERTION OF INFUSION DEVICE INTO SUPERIOR VENA CAVA, PERCUTANEOUS APPROACH: ICD-10-PCS | Performed by: HOSPITALIST

## 2018-07-05 RX ORDER — CEFAZOLIN SODIUM/WATER 2 G/20 ML
2 SYRINGE (ML) INTRAVENOUS EVERY 8 HOURS
Status: DISCONTINUED | OUTPATIENT
Start: 2018-07-05 | End: 2018-07-07

## 2018-07-05 RX ORDER — LIDOCAINE HYDROCHLORIDE 10 MG/ML
0.5 INJECTION, SOLUTION INFILTRATION; PERINEURAL ONCE AS NEEDED
Status: ACTIVE | OUTPATIENT
Start: 2018-07-05 | End: 2018-07-05

## 2018-07-05 RX ORDER — SODIUM CHLORIDE 0.9 % (FLUSH) 0.9 %
10 SYRINGE (ML) INJECTION AS NEEDED
Status: DISCONTINUED | OUTPATIENT
Start: 2018-07-05 | End: 2018-07-07

## 2018-07-05 NOTE — OCCUPATIONAL THERAPY NOTE
OCCUPATIONAL THERAPY TREATMENT NOTE - INPATIENT    Room Number: 425/425-A         Presenting Problem:  (Left Hip spacer placement )     Problem List  Active Problems:    Postoperative seroma of musculoskeletal structure after musculoskeletal procedure    H RESTRICTION  Weight Bearing Restriction: L lower extremity           L Lower Extremity: Partial Weight Bearing    PAIN ASSESSMENT  Rating: Unable to rate  Location:  (L hip-- fear of anticipated pain)        ACTIVITY TOLERANCE  O2 Saturation: 100%  Room ai session/findings; Family present    OT Goals:    Patient will complete LE dressing with MOD I   Comment: min assist with instruction in use of LH reacher and sockaid with mod encouragement    Patient will complete toilet transfer with MOD I   Comment: min a

## 2018-07-05 NOTE — CM/SW NOTE
DE met with the pt. And her dtr. At bedside. The pt. Lives with her  and dtr. In a split level home with 7 stairs between levels. The pt. Reports being independent prior to admission with adls, ambulation and driving (but not recently). The pt.  H

## 2018-07-05 NOTE — PROGRESS NOTES
Melonie Mirza Patient Status:  Inpatient    6/15/1960 MRN [id number]   Location [facility] 4W/SW/SE Attending Pawel Figueredo MD   Hosp Day # 2 PCP Marlena Pillai DO     SUBJECTIVE:  Interval History: Patient has no current complaints.   Pain my clinic in 2 weeks, call [phone] to schedule    Briana Renee  7/5/2018  7:52 AM

## 2018-07-05 NOTE — PHYSICAL THERAPY NOTE
PHYSICAL THERAPY HIP TREATMENT NOTE - INPATIENT    Room Number: 425/425-A       Number of Visits to Meet Established Goals:  (7-14-18)    Presenting Problem: post-op seroma of musculoskeletal structure s/ L TRAVIS with persistent drainage and increased pain Total     AM-PAC Score:  Raw Score: 12   PT Approx Degree of Impairment Score: 68.66%   Standardized Score (AM-PAC Scale): 35.33   CMS Modifier (G-Code): CL    FUNCTIONAL ABILITY STATUS  Gait Assessment   Gait Assistance: Minimum assistance  Distance (ft):

## 2018-07-05 NOTE — PROGRESS NOTES
[facility]    Progress Note    Marley  Patient Status:  Inpatient    6/15/1960 MRN [id number]   Location [facility] 4W/SW/SE Attending Bishnu Curran MD   [facility] Day # 2 PCP Mihaela Min, DO       Subjective:   Duy Postsurgical changes of left hip arthroplasty with radiographically uncomplicated hardware.      Dictated by (CST): Cara Coker MD on 7/03/2018 at 16:11     Approved by (CST): Cara Coker MD on 7/03/2018 at 16:13          Xr Hip W Or Wo Pelvis 1 Vie

## 2018-07-06 LAB
ERYTHROCYTE [DISTWIDTH] IN BLOOD BY AUTOMATED COUNT: 13.2 % (ref 11–15)
HCT VFR BLD AUTO: 21.1 % (ref 35–48)
HGB BLD-MCNC: 7.2 G/DL (ref 12–16)
MCH RBC QN AUTO: 31.8 PG (ref 27–32)
MCHC RBC AUTO-ENTMCNC: 34.1 G/DL (ref 32–37)
MCV RBC AUTO: 93.3 FL (ref 80–100)
PLATELET # BLD AUTO: 413 K/UL (ref 140–400)
PMV BLD AUTO: 8.2 FL (ref 7.4–10.3)
RBC # BLD AUTO: 2.26 M/UL (ref 3.7–5.4)
WBC # BLD AUTO: 8.5 K/UL (ref 4–11)

## 2018-07-06 PROCEDURE — 99232 SBSQ HOSP IP/OBS MODERATE 35: CPT | Performed by: HOSPITALIST

## 2018-07-06 RX ORDER — ONDANSETRON 4 MG/1
8 TABLET, FILM COATED ORAL ONCE
Status: COMPLETED | OUTPATIENT
Start: 2018-07-06 | End: 2018-07-06

## 2018-07-06 RX ORDER — MONTELUKAST SODIUM 10 MG/1
10 TABLET ORAL NIGHTLY PRN
Status: DISCONTINUED | OUTPATIENT
Start: 2018-07-06 | End: 2018-07-07

## 2018-07-06 RX ORDER — OXYCODONE AND ACETAMINOPHEN 10; 325 MG/1; MG/1
1-2 TABLET ORAL EVERY 6 HOURS PRN
Qty: 60 TABLET | Refills: 0 | Status: ON HOLD | OUTPATIENT
Start: 2018-07-06 | End: 2018-08-09

## 2018-07-06 NOTE — CM/SW NOTE
415pm- SW received a call from [place] with the pt's insurance and she stated she will contact [facility] with approval to transfer to rehab over the weekend if medically stable.   DE notified liaison Laverne De León of the above.    ----------------------------------

## 2018-07-06 NOTE — PROGRESS NOTES
[facility]  Progress Note    Donnamae Levels Patient Status:  Inpatient    6/15/1960 MRN [id number]   Location [facility] 4W/SW/SE Attending Kelsey Bernal MD   Hosp Day # 3 PCP Chandrakant Kelley DO     SUBJECTIVE:  INTERVAL HISTORY: PO qd. Script in chart  3. Continue PT/OT  4. Discharge planning: rehab when medically stable  5. Continue medical management  6. Dressing changes with mepilex prn   7.  Follow up in office with Dixon Cushing, MD in 2 weeks at [facility]

## 2018-07-06 NOTE — OCCUPATIONAL THERAPY NOTE
OCCUPATIONAL THERAPY TREATMENT NOTE - INPATIENT    Room Number: 425/425-A    Presenting Problem:  (Left Hip spacer placement )     Problem List  Active Problems:    Postoperative seroma of musculoskeletal structure after musculoskeletal procedure    Hip pa air    ACTIVITIES OF DAILY LIVING ASSESSMENT  AM-PAC ‘6-Clicks’ Inpatient Daily Activity Short Form  How much help from another person does the patient currently need…  -   Putting on and taking off regular lower body clothing?: A Lot  -   Bathing (serjioi

## 2018-07-06 NOTE — PROGRESS NOTES
[facility] ID PROGRESS NOTE    Cyndi Wilde Patient Status:  Inpatient    6/15/1960 MRN [id number]   Location [facility] 4W/SW/SE Attending Jessica Higgins MD   Hosp Day # 3 PCP Chente Bui DO     Subjective:  Afebrile. LLE pain.  No fevers, and tobramycin were placed. Cultures were sent.   Given IV cefazolin perioperatively.     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

## 2018-07-06 NOTE — PHYSICAL THERAPY NOTE
PHYSICAL THERAPY HIP TREATMENT NOTE - INPATIENT    Room Number: 425/425-A       Number of Visits to Meet Established Goals:  (7-14-18)    Presenting Problem: post-op seroma of musculoskeletal structure s/ L TRAVIS with persistent drainage and increased pain Approx Degree of Impairment Score: 68.66%   Standardized Score (AM-PAC Scale): 35.33   CMS Modifier (G-Code): CL    FUNCTIONAL ABILITY STATUS  Gait Assessment   Gait Assistance: Not tested  Distance (ft): 20  Assistive Device: Rolling walker     Stoop/Curb

## 2018-07-06 NOTE — PROGRESS NOTES
[facility] ID PROGRESS NOTE    Yodit Pruett Patient Status:  Inpatient    6/15/1960 MRN [id number]   Location [facility] 4W/SW/SE Attending Rosa Maria Butt MD   Hosp Day # 2 PCP Eileen Rolle DO     Subjective:  Afebrile. Tolerating abx.  Naus onset with surgery 6/20/18 now with OR 7/3/18 with gross purulence intra-op tracking to prosthesis s/p KAYKAY and spacer placement - OR cx MSSA     PLAN:     - discontinue IV vancomycin and cefepime  - start cefazolin - discharge on same - EOT 8/14/18  - foll

## 2018-07-07 VITALS
HEIGHT: 67 IN | WEIGHT: 197 LBS | BODY MASS INDEX: 30.92 KG/M2 | TEMPERATURE: 98 F | HEART RATE: 73 BPM | DIASTOLIC BLOOD PRESSURE: 67 MMHG | RESPIRATION RATE: 20 BRPM | OXYGEN SATURATION: 97 % | SYSTOLIC BLOOD PRESSURE: 109 MMHG

## 2018-07-07 LAB
BASOPHILS # BLD: 0.1 K/UL (ref 0–0.2)
BASOPHILS NFR BLD: 1 %
EOSINOPHIL # BLD: 0.6 K/UL (ref 0–0.7)
EOSINOPHIL NFR BLD: 7 %
ERYTHROCYTE [DISTWIDTH] IN BLOOD BY AUTOMATED COUNT: 13.1 % (ref 11–15)
HCT VFR BLD AUTO: 23.1 % (ref 35–48)
HGB BLD-MCNC: 7.8 G/DL (ref 12–16)
LYMPHOCYTES # BLD: 2 K/UL (ref 1–4)
LYMPHOCYTES NFR BLD: 23 %
MCH RBC QN AUTO: 31.8 PG (ref 27–32)
MCHC RBC AUTO-ENTMCNC: 33.8 G/DL (ref 32–37)
MCV RBC AUTO: 94 FL (ref 80–100)
MONOCYTES # BLD: 0.6 K/UL (ref 0–1)
MONOCYTES NFR BLD: 7 %
NEUTROPHILS # BLD AUTO: 5.5 K/UL (ref 1.8–7.7)
NEUTROPHILS NFR BLD: 63 %
PLATELET # BLD AUTO: 429 K/UL (ref 140–400)
PMV BLD AUTO: 8 FL (ref 7.4–10.3)
RBC # BLD AUTO: 2.45 M/UL (ref 3.7–5.4)
WBC # BLD AUTO: 8.8 K/UL (ref 4–11)

## 2018-07-07 PROCEDURE — 99239 HOSP IP/OBS DSCHRG MGMT >30: CPT | Performed by: HOSPITALIST

## 2018-07-07 NOTE — CM/SW NOTE
11:54am Update- RN states a dc order has been obtained. SW confirmed bed at [facility] for today. They are prepared for next available transport. Recommendation by RN is for [address] RN will inform pt of the costs ($30 plus $3/mile).  This has been arrange

## 2018-07-07 NOTE — PHYSICAL THERAPY NOTE
PHYSICAL THERAPY HIP TREATMENT NOTE - INPATIENT    Room Number: 425/425-A       Number of Visits to Meet Established Goals:  (7-14-18)    Presenting Problem: post-op seroma of musculoskeletal structure s/ L TRAVIS with persistent drainage and increased pain room?: A Lot   -   Climbing 3-5 steps with a railing?: Total     AM-PAC Score:  Raw Score: 12   PT Approx Degree of Impairment Score: 68.66%   Standardized Score (AM-PAC Scale): 35.33   CMS Modifier (G-Code): CL    FUNCTIONAL ABILITY STATUS  Gait Assessmen

## 2018-07-07 NOTE — DISCHARGE SUMMARY
[facility]    Discharge Summary    Connie Shelby Patient Status:  Inpatient    6/15/1960 MRN [id number]   Location [facility] 4W/SW/SE Attending No att. providers found   Hosp Day # 4 PCP Micaela Hargrove, DO     Date of Admissi revision TRAVIS with antibiotic spacer placement POD 4  - drain removed 7/5  - pain control adequate  - Xarelto for DVT proph   - initially was on IV vanc + cefepime, now started on ancef  - PICC line to be placed 7/5  - OR cx growing MSSA  - deemed stable fo Potassium-HCTZ 100-25 MG Tabs  Commonly known as:  HYZAAR      Take 1 tablet by mouth once daily. Quantity:  90 tablet  Refills:  1     Montelukast Sodium 10 MG Tabs  Commonly known as:  SINGULAIR      Take 1 tablet (10 mg total) by mouth nightly.    Rashida Shelby

## 2018-07-09 ENCOUNTER — TELEPHONE (OUTPATIENT)
Dept: ORTHOPEDICS CLINIC | Facility: CLINIC | Age: 58
End: 2018-07-09

## 2018-07-09 NOTE — TELEPHONE ENCOUNTER
pt called,  sx 7/3/18 with VT. She is asking when she can go home and when should she should follow up with VT. Thank you.

## 2018-07-09 NOTE — TELEPHONE ENCOUNTER
Patient can go home from rehab once her pain is well controlled and she is able to get up and move around well. The doctors over there should make that determination.   As far as follow-up goes, I should see her on July 18

## 2018-07-09 NOTE — TELEPHONE ENCOUNTER
Pt notified per VT message. F/u appt made 7/18/18 @ 210pm. Pt inquired about antibiotics via PICC line when she gets d/c and per VT that would be ordered through rehab or ID and rehab should correlate that for pt.  She had no further q's

## 2018-07-10 ENCOUNTER — SNF VISIT (OUTPATIENT)
Dept: INTERNAL MEDICINE CLINIC | Facility: SKILLED NURSING FACILITY | Age: 58
End: 2018-07-10

## 2018-07-10 ENCOUNTER — TELEPHONE (OUTPATIENT)
Dept: ORTHOPEDICS CLINIC | Facility: CLINIC | Age: 58
End: 2018-07-10

## 2018-07-10 VITALS
SYSTOLIC BLOOD PRESSURE: 127 MMHG | TEMPERATURE: 97 F | DIASTOLIC BLOOD PRESSURE: 68 MMHG | RESPIRATION RATE: 18 BRPM | HEART RATE: 84 BPM

## 2018-07-10 DIAGNOSIS — R53.81 PHYSICAL DECONDITIONING: ICD-10-CM

## 2018-07-10 DIAGNOSIS — I10 ESSENTIAL HYPERTENSION, BENIGN: ICD-10-CM

## 2018-07-10 DIAGNOSIS — M96.842 POSTOPERATIVE SEROMA OF MUSCULOSKELETAL STRUCTURE AFTER MUSCULOSKELETAL PROCEDURE: Primary | ICD-10-CM

## 2018-07-10 DIAGNOSIS — M25.552 PAIN OF LEFT HIP JOINT: ICD-10-CM

## 2018-07-10 DIAGNOSIS — M25.552 LEFT HIP PAIN: ICD-10-CM

## 2018-07-10 PROCEDURE — 99310 SBSQ NF CARE HIGH MDM 45: CPT | Performed by: NURSE PRACTITIONER

## 2018-07-10 NOTE — TELEPHONE ENCOUNTER
Marv Rodriguez and spoke to [place], [place]. Per [place], bandage is being changed about once per day. States that ABD pad over site that was changed today was fully soaked and went through to her pants. States drainage is clear brown.  Denies any odor t

## 2018-07-10 NOTE — TELEPHONE ENCOUNTER
Spoke to pt and she states she is concerned about leakage from surgical site. Pt states that the nurses have been changing her bandages once per day. Pt not sure about color or amount of leakage. Pt not sure about status of surgical site or fever.    Pt sta

## 2018-07-10 NOTE — PROGRESS NOTES
Sanjay Phillips  : 6/15/1960  Age 66year old  female patient is admitted to [place] [address] for strengthening and rehab on 18    Chief complaint: L hip wound infection s/p revision TRAVIS w/ antibiotic spacer/ and Discharge     Admitted at [facility]: 7/3/201 • Unspecified essential hypertension      Past Surgical History:  06-: ELECTROCARDIOGRAM, COMPLETE      Comment: Scanned to Media Tab - Date of Service                06-  No date: OTHER SURGICAL HISTORY      Comment: laporoscopy  No date: ergocalciferol 09429 units Oral Cap Take 1 capsule (50,000 Units total) by mouth once a week. For the next 3 months. Disp: 12 capsule Rfl: 0   Losartan Potassium-HCTZ 100-25 MG Oral Tab Take 1 tablet by mouth once daily.  Disp: 90 tablet Rfl: 1   [place] PERRLA, EOMI, sclera anicteric, conjunctiva normal; there is no nystagmus, no drainage from eyes  HENT: normocephalic; normal nose, no nasal drainage, mucous membranes pink, moist, pharynx no exudate, no visible cerumen.   NECK: supple; FROM; no JVD, no TMG infection  -F/U with Dr Aubrey Elder on 7/11/18      This is a 35 minute visit and greater than 50% of the time was spent counseling the patient and/or coordinating care.     JULIANNE Galeano  07/10/18   12:22 PM

## 2018-07-10 NOTE — TELEPHONE ENCOUNTER
Spoke to pt and informed her of VT message. Informed pt that I will call her nurse, Eugenio Tapia, and notify her as well and for her to get transportation arranged for appt. Pt verbalized understanding.

## 2018-07-10 NOTE — TELEPHONE ENCOUNTER
Stephanie and spoke to Yanet Lilly [place]. Informed her of VT message. Yanet Lilly to arrange for transportation tomorrow for appt with VT at 1:00 PM at [address] address and discussed directions to [facility]. Yanet Lilly verbalized understanding.

## 2018-07-11 ENCOUNTER — OFFICE VISIT (OUTPATIENT)
Dept: ORTHOPEDICS CLINIC | Facility: CLINIC | Age: 58
End: 2018-07-11

## 2018-07-11 DIAGNOSIS — Z96.649 INFECTION OF PROSTHETIC HIP JOINT, INITIAL ENCOUNTER (HCC): ICD-10-CM

## 2018-07-11 DIAGNOSIS — Z96.642 S/P HIP REPLACEMENT, LEFT: Primary | ICD-10-CM

## 2018-07-11 DIAGNOSIS — T84.59XA INFECTION OF PROSTHETIC HIP JOINT, INITIAL ENCOUNTER (HCC): ICD-10-CM

## 2018-07-11 PROCEDURE — 99212 OFFICE O/P EST SF 10 MIN: CPT | Performed by: ORTHOPAEDIC SURGERY

## 2018-07-11 PROCEDURE — 99024 POSTOP FOLLOW-UP VISIT: CPT | Performed by: ORTHOPAEDIC SURGERY

## 2018-07-11 NOTE — PROGRESS NOTES
Patient presents 1 week after revision left hip replacement with conversion with an antibiotic spacer due to deep infection. She currently is on IV antibiotics for MSSA. There had been some drainage noted from the wound and she is here for evaluation.

## 2018-07-12 ENCOUNTER — TELEPHONE (OUTPATIENT)
Dept: FAMILY MEDICINE CLINIC | Facility: CLINIC | Age: 58
End: 2018-07-12

## 2018-07-12 DIAGNOSIS — IMO0001: Primary | ICD-10-CM

## 2018-07-12 NOTE — TELEPHONE ENCOUNTER
Dr. Michael Matamoros,     Pt called requesting a referral for Dr. Blaze Lala. Per pt Dr. Andreas Dubin is referring her. Pt had surgery last week. Please advise and sign off on referral if you agree.     Thank you, James Nicole Small-Referral Specialist.

## 2018-07-12 NOTE — TELEPHONE ENCOUNTER
Per Ania/RN, started [facility] for pt today, would like to know if Dr Bree Chris will sign [facility] Orders.

## 2018-07-16 ENCOUNTER — TELEPHONE (OUTPATIENT)
Dept: ORTHOPEDICS CLINIC | Facility: CLINIC | Age: 58
End: 2018-07-16

## 2018-07-16 ENCOUNTER — LAB REQUISITION (OUTPATIENT)
Dept: LAB | Facility: HOSPITAL | Age: 58
End: 2018-07-16
Payer: COMMERCIAL

## 2018-07-16 DIAGNOSIS — I10 ESSENTIAL (PRIMARY) HYPERTENSION: ICD-10-CM

## 2018-07-16 LAB
ALBUMIN SERPL BCP-MCNC: 2.9 G/DL (ref 3.5–4.8)
ALBUMIN/GLOB SERPL: 0.9 {RATIO} (ref 1–2)
ALP SERPL-CCNC: 78 U/L (ref 32–100)
ALT SERPL-CCNC: 9 U/L (ref 14–54)
ANION GAP SERPL CALC-SCNC: 8 MMOL/L (ref 0–18)
AST SERPL-CCNC: 31 U/L (ref 15–41)
BASOPHILS # BLD: 0 K/UL (ref 0–0.2)
BASOPHILS NFR BLD: 1 %
BILIRUB SERPL-MCNC: 0.4 MG/DL (ref 0.3–1.2)
BUN SERPL-MCNC: 8 MG/DL (ref 8–20)
BUN/CREAT SERPL: 9.6 (ref 10–20)
CALCIUM SERPL-MCNC: 9.3 MG/DL (ref 8.5–10.5)
CHLORIDE SERPL-SCNC: 105 MMOL/L (ref 95–110)
CO2 SERPL-SCNC: 28 MMOL/L (ref 22–32)
CREAT SERPL-MCNC: 0.83 MG/DL (ref 0.5–1.5)
EOSINOPHIL # BLD: 0.4 K/UL (ref 0–0.7)
EOSINOPHIL NFR BLD: 5 %
ERYTHROCYTE [DISTWIDTH] IN BLOOD BY AUTOMATED COUNT: 14.7 % (ref 11–15)
GLOBULIN PLAS-MCNC: 3.1 G/DL (ref 2.5–3.7)
GLUCOSE SERPL-MCNC: 103 MG/DL (ref 70–99)
HCT VFR BLD AUTO: 27.4 % (ref 35–48)
HGB BLD-MCNC: 8.8 G/DL (ref 12–16)
LYMPHOCYTES # BLD: 2.2 K/UL (ref 1–4)
LYMPHOCYTES NFR BLD: 25 %
MCH RBC QN AUTO: 30.9 PG (ref 27–32)
MCHC RBC AUTO-ENTMCNC: 32.2 G/DL (ref 32–37)
MCV RBC AUTO: 96.1 FL (ref 80–100)
MONOCYTES # BLD: 0.5 K/UL (ref 0–1)
MONOCYTES NFR BLD: 5 %
NEUTROPHILS # BLD AUTO: 5.6 K/UL (ref 1.8–7.7)
NEUTROPHILS NFR BLD: 64 %
OSMOLALITY UR CALC.SUM OF ELEC: 291 MOSM/KG (ref 275–295)
PLATELET # BLD AUTO: 298 K/UL (ref 140–400)
PMV BLD AUTO: 8.6 FL (ref 7.4–10.3)
POTASSIUM SERPL-SCNC: 2.8 MMOL/L (ref 3.3–5.1)
PROT SERPL-MCNC: 6 G/DL (ref 5.9–8.4)
RBC # BLD AUTO: 2.85 M/UL (ref 3.7–5.4)
SODIUM SERPL-SCNC: 141 MMOL/L (ref 136–144)
WBC # BLD AUTO: 8.8 K/UL (ref 4–11)

## 2018-07-16 PROCEDURE — 85025 COMPLETE CBC W/AUTO DIFF WBC: CPT | Performed by: INTERNAL MEDICINE

## 2018-07-16 PROCEDURE — 80053 COMPREHEN METABOLIC PANEL: CPT | Performed by: INTERNAL MEDICINE

## 2018-07-16 RX ORDER — OXYCODONE HYDROCHLORIDE AND ACETAMINOPHEN 5; 325 MG/1; MG/1
1 TABLET ORAL EVERY 4 HOURS PRN
Qty: 40 TABLET | Refills: 0 | Status: SHIPPED | OUTPATIENT
Start: 2018-07-16 | End: 2018-08-02

## 2018-07-16 NOTE — TELEPHONE ENCOUNTER
Pt called stating pt had surgery and after went to a rehab center. Pt has a post op appt 7-18-18 but will be out of the pain medication 7-17-18 morning. Oxycodone. Can rx. Be called into the pharm.   Please call pt

## 2018-07-16 NOTE — TELEPHONE ENCOUNTER
Okay, refill ordered. Prescription at [facility] for her or her family to .

## 2018-07-16 NOTE — TELEPHONE ENCOUNTER
S/w pt and she states she has 5 tabs of percocet left. She is taking about 4 tabs daily( 1 Q6hrs) and will be out before her appt on 7/18. VT- please advise?

## 2018-07-18 ENCOUNTER — OFFICE VISIT (OUTPATIENT)
Dept: ORTHOPEDICS CLINIC | Facility: CLINIC | Age: 58
End: 2018-07-18

## 2018-07-18 DIAGNOSIS — T84.59XA INFECTION OF PROSTHETIC HIP JOINT, INITIAL ENCOUNTER (HCC): Primary | ICD-10-CM

## 2018-07-18 DIAGNOSIS — Z96.642 S/P HIP REPLACEMENT, LEFT: ICD-10-CM

## 2018-07-18 DIAGNOSIS — Z96.649 INFECTION OF PROSTHETIC HIP JOINT, INITIAL ENCOUNTER (HCC): Primary | ICD-10-CM

## 2018-07-18 PROCEDURE — 99212 OFFICE O/P EST SF 10 MIN: CPT | Performed by: ORTHOPAEDIC SURGERY

## 2018-07-18 PROCEDURE — 99024 POSTOP FOLLOW-UP VISIT: CPT | Performed by: ORTHOPAEDIC SURGERY

## 2018-07-18 NOTE — PROGRESS NOTES
Patient presents 2 weeks status post left hip exchange revision arthroplasty with antibiotic spacer placement for infection. She is on IV antibiotics. She has been ambulating with the walker and wheelchair. She is taking Percocet for pain.   Drainage fro

## 2018-07-19 ENCOUNTER — TELEPHONE (OUTPATIENT)
Dept: FAMILY MEDICINE CLINIC | Facility: CLINIC | Age: 58
End: 2018-07-19

## 2018-07-19 ENCOUNTER — TELEPHONE (OUTPATIENT)
Dept: OTHER | Age: 58
End: 2018-07-19

## 2018-07-19 NOTE — TELEPHONE ENCOUNTER
Gracia Mcclelland from [address] contact Pt start home health therapy Pt told Gracia Mcclelland that  wanted to hold off because of her womb     Gracia Mcclelland would like a verbal that its what  requested         Please advise

## 2018-07-19 NOTE — TELEPHONE ENCOUNTER
Dr. Charmayne Maser, please see labs ordered by Dr. Valentine Malik. Rec'd call from Alma De Jesus RN from the I.D. office.  wanted to make you aware of K+ 2.8 and Hgb 8.8. Please advise.

## 2018-07-20 NOTE — TELEPHONE ENCOUNTER
I have not seen this patient since she was hospitalized. It looks like she was seen at the infectious disease doctor and had a low potassium and some anemia.   Please call [facility] and asked them to draw a BMP and a CBC with differential with diagnosis

## 2018-07-20 NOTE — TELEPHONE ENCOUNTER
I have not even seen this patient and have not seen the wound. I do not know if this is the infectious disease doctor that told her to hold home health or the orthopedic doctor.

## 2018-07-20 NOTE — TELEPHONE ENCOUNTER
Hayden Walton I called [facility] at [phone] and spoke to [facility] and inform her of Dr. Bry Das message below. She wanted to know if it was ok to have it drawn tomorrow?

## 2018-07-20 NOTE — TELEPHONE ENCOUNTER
Left Ivette a message ok to have it drawn tomorrow. Louisa Cue I believe in your message below you meant to say ok to have it drawn tomorrow?

## 2018-07-23 ENCOUNTER — OFFICE VISIT (OUTPATIENT)
Dept: ORTHOPEDICS CLINIC | Facility: CLINIC | Age: 58
End: 2018-07-23

## 2018-07-23 ENCOUNTER — LAB REQUISITION (OUTPATIENT)
Dept: LAB | Facility: HOSPITAL | Age: 58
End: 2018-07-23
Payer: COMMERCIAL

## 2018-07-23 DIAGNOSIS — B95.61 METHICILLIN SUSCEPTIBLE STAPHYLOCOCCUS AUREUS INFECTION AS THE CAUSE OF DISEASES CLASSIFIED ELSEWHERE: ICD-10-CM

## 2018-07-23 DIAGNOSIS — Z96.649 INFECTION OF PROSTHETIC HIP JOINT, INITIAL ENCOUNTER (HCC): Primary | ICD-10-CM

## 2018-07-23 DIAGNOSIS — T84.59XA INFECTION OF PROSTHETIC HIP JOINT, INITIAL ENCOUNTER (HCC): Primary | ICD-10-CM

## 2018-07-23 DIAGNOSIS — T81.40XA INFECTION FOLLOWING PROCEDURE: ICD-10-CM

## 2018-07-23 DIAGNOSIS — Z96.642 S/P HIP REPLACEMENT, LEFT: ICD-10-CM

## 2018-07-23 DIAGNOSIS — Z79.2 LONG TERM CURRENT USE OF ANTIBIOTICS: ICD-10-CM

## 2018-07-23 LAB
ALBUMIN SERPL BCP-MCNC: 3.7 G/DL (ref 3.5–4.8)
ALBUMIN/GLOB SERPL: 1.2 {RATIO} (ref 1–2)
ALP SERPL-CCNC: 86 U/L (ref 32–100)
ALT SERPL-CCNC: 14 U/L (ref 14–54)
ANION GAP SERPL CALC-SCNC: 10 MMOL/L (ref 0–18)
AST SERPL-CCNC: 35 U/L (ref 15–41)
BASOPHILS # BLD: 0 K/UL (ref 0–0.2)
BASOPHILS NFR BLD: 1 %
BILIRUB SERPL-MCNC: 0.6 MG/DL (ref 0.3–1.2)
BUN SERPL-MCNC: 12 MG/DL (ref 8–20)
BUN/CREAT SERPL: 16.4 (ref 10–20)
CALCIUM SERPL-MCNC: 9.8 MG/DL (ref 8.5–10.5)
CHLORIDE SERPL-SCNC: 104 MMOL/L (ref 95–110)
CO2 SERPL-SCNC: 25 MMOL/L (ref 22–32)
CREAT SERPL-MCNC: 0.73 MG/DL (ref 0.5–1.5)
EOSINOPHIL # BLD: 0.4 K/UL (ref 0–0.7)
EOSINOPHIL NFR BLD: 4 %
ERYTHROCYTE [DISTWIDTH] IN BLOOD BY AUTOMATED COUNT: 14.5 % (ref 11–15)
GLOBULIN PLAS-MCNC: 3 G/DL (ref 2.5–3.7)
GLUCOSE SERPL-MCNC: 92 MG/DL (ref 70–99)
HCT VFR BLD AUTO: 32.6 % (ref 35–48)
HGB BLD-MCNC: 10.3 G/DL (ref 12–16)
LYMPHOCYTES # BLD: 1.8 K/UL (ref 1–4)
LYMPHOCYTES NFR BLD: 19 %
MCH RBC QN AUTO: 30.4 PG (ref 27–32)
MCHC RBC AUTO-ENTMCNC: 31.6 G/DL (ref 32–37)
MCV RBC AUTO: 96.3 FL (ref 80–100)
MONOCYTES # BLD: 0.5 K/UL (ref 0–1)
MONOCYTES NFR BLD: 5 %
NEUTROPHILS # BLD AUTO: 6.8 K/UL (ref 1.8–7.7)
NEUTROPHILS NFR BLD: 71 %
OSMOLALITY UR CALC.SUM OF ELEC: 287 MOSM/KG (ref 275–295)
PLATELET # BLD AUTO: 256 K/UL (ref 140–400)
PMV BLD AUTO: 9.6 FL (ref 7.4–10.3)
POTASSIUM SERPL-SCNC: 4.1 MMOL/L (ref 3.3–5.1)
PROT SERPL-MCNC: 6.7 G/DL (ref 5.9–8.4)
RBC # BLD AUTO: 3.38 M/UL (ref 3.7–5.4)
SODIUM SERPL-SCNC: 139 MMOL/L (ref 136–144)
WBC # BLD AUTO: 9.6 K/UL (ref 4–11)

## 2018-07-23 PROCEDURE — 85025 COMPLETE CBC W/AUTO DIFF WBC: CPT | Performed by: FAMILY MEDICINE

## 2018-07-23 PROCEDURE — 80053 COMPREHEN METABOLIC PANEL: CPT | Performed by: FAMILY MEDICINE

## 2018-07-23 PROCEDURE — 99212 OFFICE O/P EST SF 10 MIN: CPT | Performed by: ORTHOPAEDIC SURGERY

## 2018-07-23 PROCEDURE — 99024 POSTOP FOLLOW-UP VISIT: CPT | Performed by: ORTHOPAEDIC SURGERY

## 2018-07-23 RX ORDER — OXYCODONE HYDROCHLORIDE AND ACETAMINOPHEN 5; 325 MG/1; MG/1
1 TABLET ORAL EVERY 4 HOURS PRN
Qty: 40 TABLET | Refills: 0 | Status: ON HOLD | OUTPATIENT
Start: 2018-07-23 | End: 2018-08-09

## 2018-07-23 RX ORDER — OXYCODONE HYDROCHLORIDE AND ACETAMINOPHEN 5; 325 MG/1; MG/1
1 TABLET ORAL EVERY 4 HOURS PRN
Qty: 40 TABLET | Refills: 0 | Status: SHIPPED | OUTPATIENT
Start: 2018-07-23 | End: 2018-07-23

## 2018-07-25 ENCOUNTER — TELEPHONE (OUTPATIENT)
Dept: FAMILY MEDICINE CLINIC | Facility: CLINIC | Age: 58
End: 2018-07-25

## 2018-07-25 NOTE — TELEPHONE ENCOUNTER
Gracia Mcclelland [facility] OT, ci due to OT has been placed on hold until 08/01/2018 due to Excessive wound drainage and approval needed to move the order for OT and PTto after 08/01/2018.      Please advise

## 2018-07-30 ENCOUNTER — OFFICE VISIT (OUTPATIENT)
Dept: ORTHOPEDICS CLINIC | Facility: CLINIC | Age: 58
End: 2018-07-30

## 2018-07-30 ENCOUNTER — LAB REQUISITION (OUTPATIENT)
Dept: LAB | Facility: HOSPITAL | Age: 58
End: 2018-07-30
Payer: COMMERCIAL

## 2018-07-30 DIAGNOSIS — Z96.642 S/P HIP REPLACEMENT, LEFT: ICD-10-CM

## 2018-07-30 DIAGNOSIS — Z96.649 INFECTION OF PROSTHETIC HIP JOINT, INITIAL ENCOUNTER (HCC): Primary | ICD-10-CM

## 2018-07-30 DIAGNOSIS — T84.59XA INFECTION OF PROSTHETIC HIP JOINT, INITIAL ENCOUNTER (HCC): Primary | ICD-10-CM

## 2018-07-30 DIAGNOSIS — B95.61 METHICILLIN SUSCEPTIBLE STAPHYLOCOCCUS AUREUS INFECTION AS THE CAUSE OF DISEASES CLASSIFIED ELSEWHERE: ICD-10-CM

## 2018-07-30 LAB
ALBUMIN SERPL BCP-MCNC: 3.5 G/DL (ref 3.5–4.8)
ALBUMIN/GLOB SERPL: 1.1 {RATIO} (ref 1–2)
ALP SERPL-CCNC: 67 U/L (ref 32–100)
ALT SERPL-CCNC: 7 U/L (ref 14–54)
ANION GAP SERPL CALC-SCNC: 7 MMOL/L (ref 0–18)
AST SERPL-CCNC: 19 U/L (ref 15–41)
BASOPHILS # BLD: 0.1 K/UL (ref 0–0.2)
BASOPHILS NFR BLD: 1 %
BILIRUB SERPL-MCNC: 0.3 MG/DL (ref 0.3–1.2)
BUN SERPL-MCNC: 15 MG/DL (ref 8–20)
BUN/CREAT SERPL: 20.3 (ref 10–20)
CALCIUM SERPL-MCNC: 9.3 MG/DL (ref 8.5–10.5)
CHLORIDE SERPL-SCNC: 104 MMOL/L (ref 95–110)
CO2 SERPL-SCNC: 26 MMOL/L (ref 22–32)
CREAT SERPL-MCNC: 0.74 MG/DL (ref 0.5–1.5)
CRP SERPL-MCNC: 0.6 MG/DL (ref 0–0.9)
EOSINOPHIL # BLD: 0.4 K/UL (ref 0–0.7)
EOSINOPHIL NFR BLD: 6 %
ERYTHROCYTE [DISTWIDTH] IN BLOOD BY AUTOMATED COUNT: 14.1 % (ref 11–15)
ERYTHROCYTE [SEDIMENTATION RATE] IN BLOOD: 25 MM/HR (ref 0–30)
GLOBULIN PLAS-MCNC: 3.2 G/DL (ref 2.5–3.7)
GLUCOSE SERPL-MCNC: 97 MG/DL (ref 70–99)
HCT VFR BLD AUTO: 31.9 % (ref 35–48)
HGB BLD-MCNC: 10.3 G/DL (ref 12–16)
LYMPHOCYTES # BLD: 2 K/UL (ref 1–4)
LYMPHOCYTES NFR BLD: 26 %
MCH RBC QN AUTO: 30.5 PG (ref 27–32)
MCHC RBC AUTO-ENTMCNC: 32.3 G/DL (ref 32–37)
MCV RBC AUTO: 94.5 FL (ref 80–100)
MONOCYTES # BLD: 0.7 K/UL (ref 0–1)
MONOCYTES NFR BLD: 8 %
NEUTROPHILS # BLD AUTO: 4.6 K/UL (ref 1.8–7.7)
NEUTROPHILS NFR BLD: 59 %
OSMOLALITY UR CALC.SUM OF ELEC: 285 MOSM/KG (ref 275–295)
PLATELET # BLD AUTO: 261 K/UL (ref 140–400)
PMV BLD AUTO: 9.5 FL (ref 7.4–10.3)
POTASSIUM SERPL-SCNC: 3.4 MMOL/L (ref 3.3–5.1)
PROT SERPL-MCNC: 6.7 G/DL (ref 5.9–8.4)
RBC # BLD AUTO: 3.37 M/UL (ref 3.7–5.4)
SODIUM SERPL-SCNC: 137 MMOL/L (ref 136–144)
WBC # BLD AUTO: 7.8 K/UL (ref 4–11)

## 2018-07-30 PROCEDURE — 85652 RBC SED RATE AUTOMATED: CPT | Performed by: INTERNAL MEDICINE

## 2018-07-30 PROCEDURE — 85025 COMPLETE CBC W/AUTO DIFF WBC: CPT | Performed by: INTERNAL MEDICINE

## 2018-07-30 PROCEDURE — 99024 POSTOP FOLLOW-UP VISIT: CPT | Performed by: ORTHOPAEDIC SURGERY

## 2018-07-30 PROCEDURE — 80053 COMPREHEN METABOLIC PANEL: CPT | Performed by: INTERNAL MEDICINE

## 2018-07-30 PROCEDURE — 86140 C-REACTIVE PROTEIN: CPT | Performed by: INTERNAL MEDICINE

## 2018-07-30 PROCEDURE — 99212 OFFICE O/P EST SF 10 MIN: CPT | Performed by: ORTHOPAEDIC SURGERY

## 2018-07-30 NOTE — PROGRESS NOTES
Patient presents for follow-up. Drainage has been slowing down. Mostly fibrinous exudate on examination today. No signs of erythema. No fevers or chills. Staples were removed. Follow-up in 2 weeks.   We will then start discussing potential revision re

## 2018-07-31 ENCOUNTER — TELEPHONE (OUTPATIENT)
Dept: ORTHOPEDICS CLINIC | Facility: CLINIC | Age: 58
End: 2018-07-31

## 2018-07-31 NOTE — TELEPHONE ENCOUNTER
Call back to [place]. She states once last week and experienced it last night---- has some popping of her hip. No extreme pain or changes in her pain. No extreme changes in her activity  Wanted to make sure she did not cause any harm to her hip joint.  Katheran Schirmer

## 2018-07-31 NOTE — TELEPHONE ENCOUNTER
Call back to [place] . Notified that the hip popping is not something to be concerned about now. Instructed to call back for any changes.  Yg Toure understanding

## 2018-07-31 NOTE — TELEPHONE ENCOUNTER
Pt called. appt 7-30-18. Pt has question regarding a feeling pt is having in pt's leg.    Please call

## 2018-08-02 ENCOUNTER — HOSPITAL ENCOUNTER (OUTPATIENT)
Dept: GENERAL RADIOLOGY | Facility: HOSPITAL | Age: 58
Discharge: HOME | End: 2018-08-02
Attending: ORTHOPAEDIC SURGERY
Payer: COMMERCIAL

## 2018-08-02 ENCOUNTER — TELEPHONE (OUTPATIENT)
Dept: ORTHOPEDICS CLINIC | Facility: CLINIC | Age: 58
End: 2018-08-02

## 2018-08-02 ENCOUNTER — OFFICE VISIT (OUTPATIENT)
Dept: ORTHOPEDICS CLINIC | Facility: CLINIC | Age: 58
End: 2018-08-02

## 2018-08-02 DIAGNOSIS — Z96.642 S/P HIP REPLACEMENT, LEFT: ICD-10-CM

## 2018-08-02 DIAGNOSIS — Z96.649 INFECTION OF PROSTHETIC HIP JOINT, INITIAL ENCOUNTER (HCC): ICD-10-CM

## 2018-08-02 DIAGNOSIS — T84.59XA INFECTION OF PROSTHETIC HIP JOINT, INITIAL ENCOUNTER (HCC): ICD-10-CM

## 2018-08-02 DIAGNOSIS — T84.59XA INFECTION OF PROSTHETIC HIP JOINT, INITIAL ENCOUNTER (HCC): Primary | ICD-10-CM

## 2018-08-02 DIAGNOSIS — M16.12 ARTHRITIS OF LEFT HIP: ICD-10-CM

## 2018-08-02 DIAGNOSIS — Z96.649 INFECTION OF PROSTHETIC HIP JOINT, INITIAL ENCOUNTER (HCC): Primary | ICD-10-CM

## 2018-08-02 PROCEDURE — 99024 POSTOP FOLLOW-UP VISIT: CPT | Performed by: ORTHOPAEDIC SURGERY

## 2018-08-02 PROCEDURE — 73502 X-RAY EXAM HIP UNI 2-3 VIEWS: CPT | Performed by: ORTHOPAEDIC SURGERY

## 2018-08-02 PROCEDURE — 99212 OFFICE O/P EST SF 10 MIN: CPT | Performed by: ORTHOPAEDIC SURGERY

## 2018-08-02 RX ORDER — OXYCODONE HYDROCHLORIDE AND ACETAMINOPHEN 5; 325 MG/1; MG/1
1 TABLET ORAL EVERY 4 HOURS PRN
Qty: 20 TABLET | Refills: 0 | Status: ON HOLD | OUTPATIENT
Start: 2018-08-02 | End: 2018-08-09

## 2018-08-02 NOTE — PROGRESS NOTES
Patient returns for reassurance that she is having some clicking and cracking and squeaking sounds from her hip more recently not associated with any pain. The drainage is continued to diminish over time.   She is neurovascularly intact and stable from whe

## 2018-08-02 NOTE — TELEPHONE ENCOUNTER
S/w pt and she states she continues to have a clicking noise in her left hip since 7/30/18. She denies any pain associated with the clicking, but states she has had more pain than usual 5/10. She denies any injury or increase in activity.  She states the cl

## 2018-08-03 ENCOUNTER — TELEPHONE (OUTPATIENT)
Dept: FAMILY MEDICINE CLINIC | Facility: CLINIC | Age: 58
End: 2018-08-03

## 2018-08-03 NOTE — TELEPHONE ENCOUNTER
Dr Charmayne Maser, [facility]. Patient involved with the orthopedist with her ongoing hip problems, reduced mobility, admitted to becoming sad, plus her daughter will be returning to school, her  works all day. She cried during the conversation.  She said she kn

## 2018-08-04 ENCOUNTER — APPOINTMENT (OUTPATIENT)
Dept: GENERAL RADIOLOGY | Facility: HOSPITAL | Age: 58
DRG: 467 | End: 2018-08-04
Payer: COMMERCIAL

## 2018-08-04 ENCOUNTER — APPOINTMENT (OUTPATIENT)
Dept: GENERAL RADIOLOGY | Facility: HOSPITAL | Age: 58
DRG: 467 | End: 2018-08-04
Attending: EMERGENCY MEDICINE
Payer: COMMERCIAL

## 2018-08-04 ENCOUNTER — HOSPITAL ENCOUNTER (INPATIENT)
Facility: HOSPITAL | Age: 58
LOS: 6 days | Discharge: HOME HEALTH | DRG: 467 | End: 2018-08-10
Attending: EMERGENCY MEDICINE | Admitting: HOSPITALIST
Payer: COMMERCIAL

## 2018-08-04 DIAGNOSIS — Z96.649 PROSTHETIC HIP IMPLANT FAILURE, INITIAL ENCOUNTER (HCC): Primary | ICD-10-CM

## 2018-08-04 DIAGNOSIS — T84.018A PROSTHETIC HIP IMPLANT FAILURE, INITIAL ENCOUNTER (HCC): Primary | ICD-10-CM

## 2018-08-04 LAB
ANION GAP SERPL CALC-SCNC: 8 MMOL/L (ref 0–18)
BASOPHILS # BLD: 0.1 K/UL (ref 0–0.2)
BASOPHILS NFR BLD: 1 %
BUN SERPL-MCNC: 11 MG/DL (ref 8–20)
BUN/CREAT SERPL: 16.7 (ref 10–20)
CALCIUM SERPL-MCNC: 9.6 MG/DL (ref 8.5–10.5)
CHLORIDE SERPL-SCNC: 106 MMOL/L (ref 95–110)
CO2 SERPL-SCNC: 25 MMOL/L (ref 22–32)
CREAT SERPL-MCNC: 0.66 MG/DL (ref 0.5–1.5)
EOSINOPHIL # BLD: 0.3 K/UL (ref 0–0.7)
EOSINOPHIL NFR BLD: 3 %
ERYTHROCYTE [DISTWIDTH] IN BLOOD BY AUTOMATED COUNT: 13.6 % (ref 11–15)
GLUCOSE SERPL-MCNC: 105 MG/DL (ref 70–99)
HCT VFR BLD AUTO: 32.3 % (ref 35–48)
HGB BLD-MCNC: 10.5 G/DL (ref 12–16)
LYMPHOCYTES # BLD: 1.4 K/UL (ref 1–4)
LYMPHOCYTES NFR BLD: 16 %
MCH RBC QN AUTO: 30.3 PG (ref 27–32)
MCHC RBC AUTO-ENTMCNC: 32.6 G/DL (ref 32–37)
MCV RBC AUTO: 93 FL (ref 80–100)
MONOCYTES # BLD: 0.7 K/UL (ref 0–1)
MONOCYTES NFR BLD: 8 %
NEUTROPHILS # BLD AUTO: 6.4 K/UL (ref 1.8–7.7)
NEUTROPHILS NFR BLD: 72 %
OSMOLALITY UR CALC.SUM OF ELEC: 288 MOSM/KG (ref 275–295)
PLATELET # BLD AUTO: 246 K/UL (ref 140–400)
PMV BLD AUTO: 9.1 FL (ref 7.4–10.3)
POTASSIUM SERPL-SCNC: 3.2 MMOL/L (ref 3.3–5.1)
RBC # BLD AUTO: 3.48 M/UL (ref 3.7–5.4)
SODIUM SERPL-SCNC: 139 MMOL/L (ref 136–144)
WBC # BLD AUTO: 8.9 K/UL (ref 4–11)

## 2018-08-04 PROCEDURE — 73502 X-RAY EXAM HIP UNI 2-3 VIEWS: CPT | Performed by: EMERGENCY MEDICINE

## 2018-08-04 PROCEDURE — 99223 1ST HOSP IP/OBS HIGH 75: CPT | Performed by: HOSPITALIST

## 2018-08-04 RX ORDER — HYDROMORPHONE HYDROCHLORIDE 1 MG/ML
0.4 INJECTION, SOLUTION INTRAMUSCULAR; INTRAVENOUS; SUBCUTANEOUS EVERY 2 HOUR PRN
Status: DISCONTINUED | OUTPATIENT
Start: 2018-08-04 | End: 2018-08-10

## 2018-08-04 RX ORDER — SODIUM PHOSPHATE, DIBASIC AND SODIUM PHOSPHATE, MONOBASIC 7; 19 G/133ML; G/133ML
1 ENEMA RECTAL ONCE AS NEEDED
Status: DISCONTINUED | OUTPATIENT
Start: 2018-08-04 | End: 2018-08-10

## 2018-08-04 RX ORDER — CEFAZOLIN SODIUM/WATER 2 G/20 ML
2 SYRINGE (ML) INTRAVENOUS EVERY 8 HOURS
Status: DISCONTINUED | OUTPATIENT
Start: 2018-08-04 | End: 2018-08-10

## 2018-08-04 RX ORDER — ALBUTEROL SULFATE 90 UG/1
2 AEROSOL, METERED RESPIRATORY (INHALATION) EVERY 4 HOURS PRN
Status: DISCONTINUED | OUTPATIENT
Start: 2018-08-04 | End: 2018-08-10

## 2018-08-04 RX ORDER — BISACODYL 10 MG
10 SUPPOSITORY, RECTAL RECTAL
Status: DISCONTINUED | OUTPATIENT
Start: 2018-08-04 | End: 2018-08-10

## 2018-08-04 RX ORDER — METOCLOPRAMIDE HYDROCHLORIDE 5 MG/ML
10 INJECTION INTRAMUSCULAR; INTRAVENOUS EVERY 8 HOURS PRN
Status: DISCONTINUED | OUTPATIENT
Start: 2018-08-04 | End: 2018-08-07

## 2018-08-04 RX ORDER — HYDROMORPHONE HYDROCHLORIDE 1 MG/ML
0.2 INJECTION, SOLUTION INTRAMUSCULAR; INTRAVENOUS; SUBCUTANEOUS EVERY 2 HOUR PRN
Status: DISCONTINUED | OUTPATIENT
Start: 2018-08-04 | End: 2018-08-10

## 2018-08-04 RX ORDER — HYDROMORPHONE HYDROCHLORIDE 1 MG/ML
0.5 INJECTION, SOLUTION INTRAMUSCULAR; INTRAVENOUS; SUBCUTANEOUS ONCE
Status: COMPLETED | OUTPATIENT
Start: 2018-08-04 | End: 2018-08-04

## 2018-08-04 RX ORDER — HYDROMORPHONE HYDROCHLORIDE 1 MG/ML
0.8 INJECTION, SOLUTION INTRAMUSCULAR; INTRAVENOUS; SUBCUTANEOUS EVERY 2 HOUR PRN
Status: DISCONTINUED | OUTPATIENT
Start: 2018-08-04 | End: 2018-08-10

## 2018-08-04 RX ORDER — HYDROMORPHONE HYDROCHLORIDE 1 MG/ML
1 INJECTION, SOLUTION INTRAMUSCULAR; INTRAVENOUS; SUBCUTANEOUS EVERY 30 MIN PRN
Status: DISCONTINUED | OUTPATIENT
Start: 2018-08-04 | End: 2018-08-10

## 2018-08-04 RX ORDER — POLYETHYLENE GLYCOL 3350 17 G/17G
17 POWDER, FOR SOLUTION ORAL DAILY PRN
Status: DISCONTINUED | OUTPATIENT
Start: 2018-08-04 | End: 2018-08-07

## 2018-08-04 RX ORDER — HYDROMORPHONE HYDROCHLORIDE 1 MG/ML
2 INJECTION, SOLUTION INTRAMUSCULAR; INTRAVENOUS; SUBCUTANEOUS ONCE
Status: COMPLETED | OUTPATIENT
Start: 2018-08-04 | End: 2018-08-04

## 2018-08-04 RX ORDER — ONDANSETRON 2 MG/ML
4 INJECTION INTRAMUSCULAR; INTRAVENOUS EVERY 6 HOURS PRN
Status: DISCONTINUED | OUTPATIENT
Start: 2018-08-04 | End: 2018-08-10

## 2018-08-04 RX ORDER — DOCUSATE SODIUM 100 MG/1
100 CAPSULE, LIQUID FILLED ORAL 2 TIMES DAILY
Status: DISCONTINUED | OUTPATIENT
Start: 2018-08-04 | End: 2018-08-07

## 2018-08-04 RX ORDER — ASPIRIN 325 MG
325 TABLET ORAL 2 TIMES DAILY
Status: DISCONTINUED | OUTPATIENT
Start: 2018-08-04 | End: 2018-08-05

## 2018-08-04 RX ORDER — OXYCODONE AND ACETAMINOPHEN 10; 325 MG/1; MG/1
1 TABLET ORAL EVERY 4 HOURS PRN
Status: DISCONTINUED | OUTPATIENT
Start: 2018-08-04 | End: 2018-08-08

## 2018-08-04 RX ORDER — HYDROMORPHONE HYDROCHLORIDE 1 MG/ML
1 INJECTION, SOLUTION INTRAMUSCULAR; INTRAVENOUS; SUBCUTANEOUS EVERY 30 MIN PRN
Status: DISCONTINUED | OUTPATIENT
Start: 2018-08-04 | End: 2018-08-04

## 2018-08-04 RX ORDER — 0.9 % SODIUM CHLORIDE 0.9 %
VIAL (ML) INJECTION
Status: COMPLETED
Start: 2018-08-04 | End: 2018-08-04

## 2018-08-04 RX ORDER — SODIUM CHLORIDE 0.9 % (FLUSH) 0.9 %
3 SYRINGE (ML) INJECTION AS NEEDED
Status: DISCONTINUED | OUTPATIENT
Start: 2018-08-04 | End: 2018-08-10

## 2018-08-04 RX ORDER — POTASSIUM CHLORIDE 20 MEQ/1
40 TABLET, EXTENDED RELEASE ORAL EVERY 4 HOURS
Status: COMPLETED | OUTPATIENT
Start: 2018-08-04 | End: 2018-08-05

## 2018-08-04 NOTE — ED INITIAL ASSESSMENT (HPI)
Pt to ER with c/o increased left hip pain. Pt with recent left hip replacement that got infected. Left hip spacer inserted on 7-3-18. Pt on Cefazolin abx. Pt denies fever at home.  Pt last took pain medication at 11 am. Pt states she was up stairs at home a

## 2018-08-04 NOTE — ED PROVIDER NOTES
Patient Seen in: [facility] AND [facility] Emergency Department     History   Patient presents with:  Postop/Procedure Problem    Stated Complaint: left hip pain    HPI    66year old female approximately 1 month status post left hip exchange revision arthroplasty Pain.   rivaroxaban (XARELTO) 10 MG Oral Tab,  Take 1 tablet (10 mg total) by mouth daily. Ondansetron HCl (ZOFRAN) 4 mg tablet,  Take 1 tablet (4 mg total) by mouth every 8 (eight) hours as needed for Nausea.    aspirin 325 MG Oral Tab,  Take 1 tablet (3 is cooperative. Non-toxic appearance. She does not have a sickly appearance. She does not appear ill. No distress. HENT:   Head: Normocephalic and atraumatic.  Head is without raccoon's eyes, without right periorbital erythema and without left periorbita Result    PROCEDURE: XR HIP W OR WO PELVIS 2 OR 3 VIEWS, LEFT (CPT=73502)         COMPARISON: [facility], [facility] 2nd Floor, XR HIP W OR WO     PELVIS 2 OR 3 VIEWS, LEFT (CPT=73502), 8/02/2018, 13:41.          INDICATIONS: Increasing left hi personally reviewed and interpreted all ED vitals.     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)  (primary encounter diagnosis)    Plan: General supportive care recommendations given to patient.   Adm

## 2018-08-05 LAB
ANION GAP SERPL CALC-SCNC: 8 MMOL/L (ref 0–18)
ANTIBODY SCREEN: NEGATIVE
B-HCG UR QL: NEGATIVE
BASOPHILS # BLD: 0.1 K/UL (ref 0–0.2)
BASOPHILS NFR BLD: 1 %
BUN SERPL-MCNC: 13 MG/DL (ref 8–20)
BUN/CREAT SERPL: 16.3 (ref 10–20)
CALCIUM SERPL-MCNC: 9.3 MG/DL (ref 8.5–10.5)
CHLORIDE SERPL-SCNC: 108 MMOL/L (ref 95–110)
CO2 SERPL-SCNC: 25 MMOL/L (ref 22–32)
CREAT SERPL-MCNC: 0.8 MG/DL (ref 0.5–1.5)
CRP SERPL HS-MCNC: 16.1 MG/L (ref 0–7.5)
EOSINOPHIL # BLD: 0.4 K/UL (ref 0–0.7)
EOSINOPHIL NFR BLD: 6 %
ERYTHROCYTE [DISTWIDTH] IN BLOOD BY AUTOMATED COUNT: 13.3 % (ref 11–15)
ERYTHROCYTE [SEDIMENTATION RATE] IN BLOOD: 19 MM/HR (ref 0–30)
GLUCOSE SERPL-MCNC: 94 MG/DL (ref 70–99)
HCT VFR BLD AUTO: 31.4 % (ref 35–48)
HGB BLD-MCNC: 10.3 G/DL (ref 12–16)
LYMPHOCYTES # BLD: 1.6 K/UL (ref 1–4)
LYMPHOCYTES NFR BLD: 24 %
MAGNESIUM SERPL-MCNC: 1.9 MG/DL (ref 1.8–2.5)
MCH RBC QN AUTO: 30.6 PG (ref 27–32)
MCHC RBC AUTO-ENTMCNC: 32.8 G/DL (ref 32–37)
MCV RBC AUTO: 93.1 FL (ref 80–100)
MONOCYTES # BLD: 0.8 K/UL (ref 0–1)
MONOCYTES NFR BLD: 11 %
MRSA DNA SPEC QL NAA+PROBE: NEGATIVE
NEUTROPHILS # BLD AUTO: 3.9 K/UL (ref 1.8–7.7)
NEUTROPHILS NFR BLD: 58 %
OSMOLALITY UR CALC.SUM OF ELEC: 292 MOSM/KG (ref 275–295)
PLATELET # BLD AUTO: 249 K/UL (ref 140–400)
PMV BLD AUTO: 9 FL (ref 7.4–10.3)
POTASSIUM SERPL-SCNC: 4.6 MMOL/L (ref 3.3–5.1)
POTASSIUM SERPL-SCNC: 4.6 MMOL/L (ref 3.3–5.1)
RBC # BLD AUTO: 3.37 M/UL (ref 3.7–5.4)
RH BLOOD TYPE: NEGATIVE
SODIUM SERPL-SCNC: 141 MMOL/L (ref 136–144)
WBC # BLD AUTO: 6.8 K/UL (ref 4–11)

## 2018-08-05 PROCEDURE — 99232 SBSQ HOSP IP/OBS MODERATE 35: CPT | Performed by: HOSPITALIST

## 2018-08-05 RX ORDER — ENOXAPARIN SODIUM 100 MG/ML
30 INJECTION SUBCUTANEOUS EVERY 12 HOURS SCHEDULED
Status: DISCONTINUED | OUTPATIENT
Start: 2018-08-05 | End: 2018-08-05

## 2018-08-05 NOTE — H&P
[address] [facility] Patient Status:  Inpatient    6/15/1960 MRN [id number]   Location [facility] 4W/SW/SE Attending Nicky Mauro MD   Hosp Day # 0 PCP Daniele Sunshine, DO     Date:  2018  Angela Pierre No date: TONSILLECTOMY  No date: TOTAL HIP REPLACEMENT  Family History   Problem Relation Age of Onset   • Cancer Mother      skin   • Other [OTHER] Mother      Aortic aneurysm (cause of death)   • Heart Disease Father      CAD (cause of death)     Radhames °C), temperature source Oral, resp. rate 22, height 5' 7\" (1.702 m), weight 195 lb (88.5 kg), SpO2 99 %, not currently breastfeeding. GENERAL:  The patient appeared to be in no distress. Lying in bed, comfortably.   SKIN:  Warm and hydrated  PSYCHIATR migration of the proximal left femoral shaft and intertrochanteric region.  2. 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

## 2018-08-05 NOTE — CONSULTS
[address] [address] Patient Status:  Inpatient    6/15/1960 MRN [id number]   Location [facility] 4W/SW/SE Attending Charisse Marcum MD   Hosp Day # 1 PCP Eileen Rolle DO     Subjective:    Patient is admitted for left to \"removal of cyst under left arm\"   • High blood pressure    • Osteoarthritis    • Pilonidal cyst    • Unspecified essential hypertension       Past Surgical History:  06-: ELECTROCARDIOGRAM, COMPLETE      Comment: Scanned to Media Tab - Date of Se puffs into the lungs every 4 (four) hours as needed for Wheezing or Shortness of Breath.  Disp: 18 g Rfl: 5 More than a month at Unknown time   Spacer/Aero Chamber Mouthpiece Does not apply Misc Use with HFA inhaler as directed Disp: 1 each Rfl: 0 Taking The risks due to aseptic loosening, infection, stiffness, thromboembolic complications among others were discussed. I had an extensive discussion with the patient and her daughter.   Given the potential risk of 2 surgeries to redo the spacer and then reimp

## 2018-08-05 NOTE — PROGRESS NOTES
Patient with failure of her antibiotic spacer. Admitted for pain control and inability to ambulate.     Neuro intact  Wound with mild fibrinous exudate  Calf soft    Inflammatory markers from 7/30 WNL, WBC today WNL  Perhaps will consider reimplantation pe

## 2018-08-05 NOTE — CM/SW NOTE
SW informed the pt is current with home health services through [facility]. Orders are needed to resume services at [place], if appropriate.     BOGDAN montejo [id number]

## 2018-08-05 NOTE — PROGRESS NOTES
[facility]  Progress Note     Mo Rodrigues  : 6/15/1960    Status: Inpatient  Day #: 1    Attending: Jhonatan Hill MD  PCP: Didier Funez DO      Assessment and Plan     Failure of hip spacer   -ortho consulted an reviewed options with BILT  0.3   --    --    AST  19   --    --    ALT  7*   --    --    ALKPHO  67   --    --    TP  6.7   --    --    CRP  0.6   --    --        Xr Hip W Or Wo Pelvis 2 Or 3 Views, Left (cpt=73502)    Result Date: 8/4/2018  CONCLUSION:  1.  Left hip prosthet

## 2018-08-06 PROCEDURE — 99232 SBSQ HOSP IP/OBS MODERATE 35: CPT | Performed by: HOSPITALIST

## 2018-08-06 RX ORDER — GABAPENTIN 300 MG/1
600 CAPSULE ORAL ONCE
Status: COMPLETED | OUTPATIENT
Start: 2018-08-07 | End: 2018-08-07

## 2018-08-06 RX ORDER — SCOLOPAMINE TRANSDERMAL SYSTEM 1 MG/1
1 PATCH, EXTENDED RELEASE TRANSDERMAL ONCE
Status: DISCONTINUED | OUTPATIENT
Start: 2018-08-06 | End: 2018-08-06

## 2018-08-06 RX ORDER — GABAPENTIN 300 MG/1
600 CAPSULE ORAL ONCE
Status: DISCONTINUED | OUTPATIENT
Start: 2018-08-06 | End: 2018-08-06

## 2018-08-06 RX ORDER — SCOLOPAMINE TRANSDERMAL SYSTEM 1 MG/1
1 PATCH, EXTENDED RELEASE TRANSDERMAL ONCE
Status: DISCONTINUED | OUTPATIENT
Start: 2018-08-07 | End: 2018-08-10

## 2018-08-06 RX ORDER — ACETAMINOPHEN 500 MG
1000 TABLET ORAL ONCE
Status: COMPLETED | OUTPATIENT
Start: 2018-08-07 | End: 2018-08-07

## 2018-08-06 RX ORDER — ACETAMINOPHEN 500 MG
1000 TABLET ORAL ONCE
Status: DISCONTINUED | OUTPATIENT
Start: 2018-08-06 | End: 2018-08-06

## 2018-08-06 NOTE — PROGRESS NOTES
08/06/18 1644   Clinical Encounter Type   Visited With Patient   Routine Visit Introduction   Continue Visiting Yes   Surgical Visit Pre-op   Patient Spiritual Encounters   Spiritual Assessment Completed No     Made pre-op visit,  had already been

## 2018-08-06 NOTE — PROGRESS NOTES
[facility]    Progress Note    Mo Rodrigues Patient Status:  Inpatient    6/15/1960 MRN [id number]   Location [facility] 4W/SW/SE Attending Wei Muñoz MD   Hosp Day # 2 PCP Didier Funez DO       Subjective:     Fru 06/07/2018   TSH 1.84 04/23/2018   ESRML 19 08/05/2018   CRP 0.6 07/30/2018   MG 1.9 08/05/2018       Xr Hip W Or Wo Pelvis 2 Or 3 Views, Left (cpt=73502)    Result Date: 8/4/2018  CONCLUSION:  1.  Left hip prosthetic spacer with complete separation of the

## 2018-08-07 ENCOUNTER — ANESTHESIA EVENT (OUTPATIENT)
Dept: SURGERY | Facility: HOSPITAL | Age: 58
DRG: 467 | End: 2018-08-07
Payer: COMMERCIAL

## 2018-08-07 ENCOUNTER — APPOINTMENT (OUTPATIENT)
Dept: GENERAL RADIOLOGY | Facility: HOSPITAL | Age: 58
DRG: 467 | End: 2018-08-07
Attending: ORTHOPAEDIC SURGERY
Payer: COMMERCIAL

## 2018-08-07 ENCOUNTER — ANESTHESIA (OUTPATIENT)
Dept: SURGERY | Facility: HOSPITAL | Age: 58
DRG: 467 | End: 2018-08-07
Payer: COMMERCIAL

## 2018-08-07 PROCEDURE — 73501 X-RAY EXAM HIP UNI 1 VIEW: CPT | Performed by: ORTHOPAEDIC SURGERY

## 2018-08-07 PROCEDURE — 99232 SBSQ HOSP IP/OBS MODERATE 35: CPT | Performed by: HOSPITALIST

## 2018-08-07 PROCEDURE — 0SRB04A REPLACEMENT OF LEFT HIP JOINT WITH CERAMIC ON POLYETHYLENE SYNTHETIC SUBSTITUTE, UNCEMENTED, OPEN APPROACH: ICD-10-PCS | Performed by: ORTHOPAEDIC SURGERY

## 2018-08-07 PROCEDURE — 76001 XR C-ARM FLUORO >1 HOUR  (CPT=76001): CPT | Performed by: ORTHOPAEDIC SURGERY

## 2018-08-07 PROCEDURE — 73502 X-RAY EXAM HIP UNI 2-3 VIEWS: CPT | Performed by: ORTHOPAEDIC SURGERY

## 2018-08-07 PROCEDURE — 0SPB08Z REMOVAL OF SPACER FROM LEFT HIP JOINT, OPEN APPROACH: ICD-10-PCS | Performed by: ORTHOPAEDIC SURGERY

## 2018-08-07 DEVICE — SUMMIT FEMORAL STEM 12/14 TAPER TAPER ED W/POROCOAT SIZE 3 HI 135MM
Type: IMPLANTABLE DEVICE | Status: FUNCTIONAL
Brand: SUMMIT POROCOAT

## 2018-08-07 DEVICE — PINNACLE HIP SOLUTIONS ALTRX POLYETHYLENE ACETABULAR LINER +4 NEUTRAL 36MM ID 60MM OD
Type: IMPLANTABLE DEVICE | Status: FUNCTIONAL
Brand: PINNACLE ALTRX

## 2018-08-07 DEVICE — PINNACLE GRIPTION ACETABULAR SHELL MULTI-HOLE 60MM OD
Type: IMPLANTABLE DEVICE | Status: FUNCTIONAL
Brand: PINNACLE GRIPTION

## 2018-08-07 DEVICE — BIOLOX DELTA CERAMIC FEMORAL HEAD +5.0 36MM DIA 12/14 TAPER
Type: IMPLANTABLE DEVICE | Status: FUNCTIONAL
Brand: BIOLOX DELTA

## 2018-08-07 RX ORDER — SODIUM CHLORIDE 9 MG/ML
INJECTION, SOLUTION INTRAVENOUS CONTINUOUS
Status: DISCONTINUED | OUTPATIENT
Start: 2018-08-07 | End: 2018-08-10

## 2018-08-07 RX ORDER — CYCLOBENZAPRINE HCL 10 MG
10 TABLET ORAL EVERY 8 HOURS PRN
Status: DISCONTINUED | OUTPATIENT
Start: 2018-08-07 | End: 2018-08-10

## 2018-08-07 RX ORDER — MIDAZOLAM HYDROCHLORIDE 1 MG/ML
INJECTION INTRAMUSCULAR; INTRAVENOUS AS NEEDED
Status: DISCONTINUED | OUTPATIENT
Start: 2018-08-07 | End: 2018-08-07 | Stop reason: SURG

## 2018-08-07 RX ORDER — CEFAZOLIN SODIUM/WATER 2 G/20 ML
2 SYRINGE (ML) INTRAVENOUS ONCE
Status: DISCONTINUED | OUTPATIENT
Start: 2018-08-07 | End: 2018-08-10

## 2018-08-07 RX ORDER — DIPHENHYDRAMINE HYDROCHLORIDE 50 MG/ML
25 INJECTION INTRAMUSCULAR; INTRAVENOUS ONCE AS NEEDED
Status: ACTIVE | OUTPATIENT
Start: 2018-08-07 | End: 2018-08-07

## 2018-08-07 RX ORDER — ACETAMINOPHEN 325 MG/1
650 TABLET ORAL EVERY 4 HOURS PRN
Status: DISCONTINUED | OUTPATIENT
Start: 2018-08-07 | End: 2018-08-10

## 2018-08-07 RX ORDER — SODIUM CHLORIDE 0.9 % (FLUSH) 0.9 %
10 SYRINGE (ML) INJECTION AS NEEDED
Status: DISCONTINUED | OUTPATIENT
Start: 2018-08-07 | End: 2018-08-10

## 2018-08-07 RX ORDER — FAMOTIDINE 20 MG/1
20 TABLET ORAL 2 TIMES DAILY
Status: DISCONTINUED | OUTPATIENT
Start: 2018-08-07 | End: 2018-08-10

## 2018-08-07 RX ORDER — HYDROCODONE BITARTRATE AND ACETAMINOPHEN 5; 325 MG/1; MG/1
1 TABLET ORAL AS NEEDED
Status: DISCONTINUED | OUTPATIENT
Start: 2018-08-07 | End: 2018-08-07 | Stop reason: HOSPADM

## 2018-08-07 RX ORDER — SODIUM CHLORIDE, SODIUM LACTATE, POTASSIUM CHLORIDE, CALCIUM CHLORIDE 600; 310; 30; 20 MG/100ML; MG/100ML; MG/100ML; MG/100ML
INJECTION, SOLUTION INTRAVENOUS CONTINUOUS
Status: DISCONTINUED | OUTPATIENT
Start: 2018-08-07 | End: 2018-08-07 | Stop reason: HOSPADM

## 2018-08-07 RX ORDER — MORPHINE SULFATE 4 MG/ML
2 INJECTION, SOLUTION INTRAMUSCULAR; INTRAVENOUS EVERY 10 MIN PRN
Status: DISCONTINUED | OUTPATIENT
Start: 2018-08-07 | End: 2018-08-07 | Stop reason: HOSPADM

## 2018-08-07 RX ORDER — SENNOSIDES 8.6 MG
17.2 TABLET ORAL NIGHTLY
Status: DISCONTINUED | OUTPATIENT
Start: 2018-08-07 | End: 2018-08-10

## 2018-08-07 RX ORDER — FAMOTIDINE 10 MG/ML
20 INJECTION, SOLUTION INTRAVENOUS 2 TIMES DAILY
Status: DISCONTINUED | OUTPATIENT
Start: 2018-08-07 | End: 2018-08-10

## 2018-08-07 RX ORDER — METOCLOPRAMIDE HYDROCHLORIDE 5 MG/ML
10 INJECTION INTRAMUSCULAR; INTRAVENOUS EVERY 6 HOURS PRN
Status: ACTIVE | OUTPATIENT
Start: 2018-08-07 | End: 2018-08-09

## 2018-08-07 RX ORDER — MORPHINE SULFATE 10 MG/ML
6 INJECTION, SOLUTION INTRAMUSCULAR; INTRAVENOUS EVERY 10 MIN PRN
Status: DISCONTINUED | OUTPATIENT
Start: 2018-08-07 | End: 2018-08-07 | Stop reason: HOSPADM

## 2018-08-07 RX ORDER — NALOXONE HYDROCHLORIDE 0.4 MG/ML
80 INJECTION, SOLUTION INTRAMUSCULAR; INTRAVENOUS; SUBCUTANEOUS AS NEEDED
Status: DISCONTINUED | OUTPATIENT
Start: 2018-08-07 | End: 2018-08-07 | Stop reason: HOSPADM

## 2018-08-07 RX ORDER — HYDROCODONE BITARTRATE AND ACETAMINOPHEN 7.5; 325 MG/1; MG/1
1 TABLET ORAL EVERY 4 HOURS PRN
Status: DISCONTINUED | OUTPATIENT
Start: 2018-08-07 | End: 2018-08-10

## 2018-08-07 RX ORDER — HYDROCODONE BITARTRATE AND ACETAMINOPHEN 5; 325 MG/1; MG/1
2 TABLET ORAL AS NEEDED
Status: DISCONTINUED | OUTPATIENT
Start: 2018-08-07 | End: 2018-08-07 | Stop reason: HOSPADM

## 2018-08-07 RX ORDER — FAMOTIDINE 20 MG/1
20 TABLET ORAL ONCE
Status: COMPLETED | OUTPATIENT
Start: 2018-08-07 | End: 2018-08-07

## 2018-08-07 RX ORDER — SODIUM PHOSPHATE, DIBASIC AND SODIUM PHOSPHATE, MONOBASIC 7; 19 G/133ML; G/133ML
1 ENEMA RECTAL ONCE AS NEEDED
Status: DISCONTINUED | OUTPATIENT
Start: 2018-08-07 | End: 2018-08-07

## 2018-08-07 RX ORDER — POLYETHYLENE GLYCOL 3350 17 G/17G
17 POWDER, FOR SOLUTION ORAL DAILY PRN
Status: DISCONTINUED | OUTPATIENT
Start: 2018-08-07 | End: 2018-08-10

## 2018-08-07 RX ORDER — LIDOCAINE HYDROCHLORIDE 10 MG/ML
INJECTION, SOLUTION EPIDURAL; INFILTRATION; INTRACAUDAL; PERINEURAL AS NEEDED
Status: DISCONTINUED | OUTPATIENT
Start: 2018-08-07 | End: 2018-08-07 | Stop reason: SURG

## 2018-08-07 RX ORDER — HYDROCODONE BITARTRATE AND ACETAMINOPHEN 7.5; 325 MG/1; MG/1
2 TABLET ORAL EVERY 4 HOURS PRN
Status: DISCONTINUED | OUTPATIENT
Start: 2018-08-07 | End: 2018-08-10

## 2018-08-07 RX ORDER — BISACODYL 10 MG
10 SUPPOSITORY, RECTAL RECTAL
Status: DISCONTINUED | OUTPATIENT
Start: 2018-08-07 | End: 2018-08-07

## 2018-08-07 RX ORDER — SODIUM CHLORIDE, SODIUM LACTATE, POTASSIUM CHLORIDE, CALCIUM CHLORIDE 600; 310; 30; 20 MG/100ML; MG/100ML; MG/100ML; MG/100ML
INJECTION, SOLUTION INTRAVENOUS CONTINUOUS PRN
Status: DISCONTINUED | OUTPATIENT
Start: 2018-08-07 | End: 2018-08-07 | Stop reason: SURG

## 2018-08-07 RX ORDER — DIPHENHYDRAMINE HYDROCHLORIDE 50 MG/ML
12.5 INJECTION INTRAMUSCULAR; INTRAVENOUS EVERY 4 HOURS PRN
Status: DISCONTINUED | OUTPATIENT
Start: 2018-08-07 | End: 2018-08-10

## 2018-08-07 RX ORDER — LABETALOL HYDROCHLORIDE 5 MG/ML
INJECTION, SOLUTION INTRAVENOUS AS NEEDED
Status: DISCONTINUED | OUTPATIENT
Start: 2018-08-07 | End: 2018-08-07 | Stop reason: SURG

## 2018-08-07 RX ORDER — ONDANSETRON 2 MG/ML
4 INJECTION INTRAMUSCULAR; INTRAVENOUS EVERY 4 HOURS PRN
Status: DISCONTINUED | OUTPATIENT
Start: 2018-08-07 | End: 2018-08-10

## 2018-08-07 RX ORDER — MORPHINE SULFATE 2 MG/ML
2 INJECTION, SOLUTION INTRAMUSCULAR; INTRAVENOUS EVERY 2 HOUR PRN
Status: DISCONTINUED | OUTPATIENT
Start: 2018-08-07 | End: 2018-08-10

## 2018-08-07 RX ORDER — MORPHINE SULFATE 4 MG/ML
4 INJECTION, SOLUTION INTRAMUSCULAR; INTRAVENOUS EVERY 10 MIN PRN
Status: DISCONTINUED | OUTPATIENT
Start: 2018-08-07 | End: 2018-08-07 | Stop reason: HOSPADM

## 2018-08-07 RX ORDER — CEFAZOLIN SODIUM/WATER 2 G/20 ML
2 SYRINGE (ML) INTRAVENOUS ONCE
Status: DISCONTINUED | OUTPATIENT
Start: 2018-08-07 | End: 2018-08-07

## 2018-08-07 RX ORDER — METOCLOPRAMIDE 10 MG/1
10 TABLET ORAL ONCE
Status: COMPLETED | OUTPATIENT
Start: 2018-08-07 | End: 2018-08-07

## 2018-08-07 RX ORDER — ONDANSETRON 2 MG/ML
4 INJECTION INTRAMUSCULAR; INTRAVENOUS ONCE AS NEEDED
Status: COMPLETED | OUTPATIENT
Start: 2018-08-07 | End: 2018-08-07

## 2018-08-07 RX ORDER — HYDROMORPHONE HYDROCHLORIDE 1 MG/ML
0.5 INJECTION, SOLUTION INTRAMUSCULAR; INTRAVENOUS; SUBCUTANEOUS EVERY 5 MIN PRN
Status: DISCONTINUED | OUTPATIENT
Start: 2018-08-07 | End: 2018-08-07 | Stop reason: HOSPADM

## 2018-08-07 RX ORDER — MORPHINE SULFATE 4 MG/ML
4 INJECTION, SOLUTION INTRAMUSCULAR; INTRAVENOUS EVERY 2 HOUR PRN
Status: DISCONTINUED | OUTPATIENT
Start: 2018-08-07 | End: 2018-08-10

## 2018-08-07 RX ORDER — HALOPERIDOL 5 MG/ML
0.25 INJECTION INTRAMUSCULAR ONCE AS NEEDED
Status: DISCONTINUED | OUTPATIENT
Start: 2018-08-07 | End: 2018-08-07 | Stop reason: HOSPADM

## 2018-08-07 RX ORDER — MORPHINE SULFATE 2 MG/ML
1 INJECTION, SOLUTION INTRAMUSCULAR; INTRAVENOUS EVERY 2 HOUR PRN
Status: DISCONTINUED | OUTPATIENT
Start: 2018-08-07 | End: 2018-08-10

## 2018-08-07 RX ORDER — DIPHENHYDRAMINE HCL 25 MG
25 CAPSULE ORAL EVERY 4 HOURS PRN
Status: DISCONTINUED | OUTPATIENT
Start: 2018-08-07 | End: 2018-08-10

## 2018-08-07 RX ORDER — ROCURONIUM BROMIDE 10 MG/ML
INJECTION, SOLUTION INTRAVENOUS AS NEEDED
Status: DISCONTINUED | OUTPATIENT
Start: 2018-08-07 | End: 2018-08-07 | Stop reason: SURG

## 2018-08-07 RX ORDER — DOCUSATE SODIUM 100 MG/1
100 CAPSULE, LIQUID FILLED ORAL 2 TIMES DAILY
Status: DISCONTINUED | OUTPATIENT
Start: 2018-08-07 | End: 2018-08-10

## 2018-08-07 RX ADMIN — SODIUM CHLORIDE, SODIUM LACTATE, POTASSIUM CHLORIDE, CALCIUM CHLORIDE: 600; 310; 30; 20 INJECTION, SOLUTION INTRAVENOUS at 18:10:00

## 2018-08-07 RX ADMIN — SODIUM CHLORIDE, SODIUM LACTATE, POTASSIUM CHLORIDE, CALCIUM CHLORIDE: 600; 310; 30; 20 INJECTION, SOLUTION INTRAVENOUS at 17:55:00

## 2018-08-07 RX ADMIN — SODIUM CHLORIDE, SODIUM LACTATE, POTASSIUM CHLORIDE, CALCIUM CHLORIDE: 600; 310; 30; 20 INJECTION, SOLUTION INTRAVENOUS at 16:18:00

## 2018-08-07 RX ADMIN — LIDOCAINE HYDROCHLORIDE 25 MG: 10 INJECTION, SOLUTION EPIDURAL; INFILTRATION; INTRACAUDAL; PERINEURAL at 16:21:00

## 2018-08-07 RX ADMIN — SODIUM CHLORIDE, SODIUM LACTATE, POTASSIUM CHLORIDE, CALCIUM CHLORIDE: 600; 310; 30; 20 INJECTION, SOLUTION INTRAVENOUS at 18:32:00

## 2018-08-07 RX ADMIN — MIDAZOLAM HYDROCHLORIDE 2 MG: 1 INJECTION INTRAMUSCULAR; INTRAVENOUS at 16:18:00

## 2018-08-07 RX ADMIN — CEFAZOLIN SODIUM/WATER 2 G: 2 G/20 ML SYRINGE (ML) INTRAVENOUS at 16:38:00

## 2018-08-07 RX ADMIN — LABETALOL HYDROCHLORIDE 5 MG: 5 INJECTION, SOLUTION INTRAVENOUS at 17:45:00

## 2018-08-07 RX ADMIN — SODIUM CHLORIDE, SODIUM LACTATE, POTASSIUM CHLORIDE, CALCIUM CHLORIDE: 600; 310; 30; 20 INJECTION, SOLUTION INTRAVENOUS at 17:20:00

## 2018-08-07 RX ADMIN — ROCURONIUM BROMIDE 50 MG: 10 INJECTION, SOLUTION INTRAVENOUS at 16:22:00

## 2018-08-07 NOTE — ANESTHESIA PREPROCEDURE EVALUATION
Anesthesia PreOp Note    HPI:     Sweetie Sanchez is a 66year old female who presents for preoperative consultation requested by: Mari Be MD    Date of Surgery: 8/4/2018 - 8/7/2018    Procedure(s):  HIP TOTAL ARTHROPLASTY REVISION  Indication: Left pilonidal cyst X2`  No date: SKIN SURGERY      Comment: several cyst excision   No date: TONSILLECTOMY  No date: TOTAL HIP REPLACEMENT      Prescriptions Prior to Admission:  oxyCODONE-acetaminophen (PERCOCET) 5-325 MG Oral Tab Take 1 tablet by mouth every in sodium chloride 0.9 % 250 mL IVPB add-vantage 1,000 mg Intravenous Once SHIRA Bagley    ceFAZolin sodium (ANCEF/KEFZOL) 2 GM/20ML premix IV syringe 2 g 2 g Intravenous Once Stephanie Wakefield MD    EPINEPHrine (ADRENALIN) 1 MG/ML 0.5 mg, ketorolac injection 0.8 mg 0.8 mg Intravenous Q2H PRN Lady Sommer MD    [facility] Hold] ondansetron HCl [facility]) injection 4 mg 4 mg Intravenous Q6H PRN Lady Sommer MD 4 mg at 08/07/18 1028   [MAR Hold] Metoclopramide HCl (REGLAN) injection 10 mg 10 mg Intraven Social History Narrative    The patient does not use an assistive device. .      The patient does live in a home with stairs. Available pre-op labs reviewed.     Lab Results  Component Value Date   WBC 6.8 08/05/2018   RBC 3.37 (L) 08/05/2018 Risks Discussed With:  Patient and spouse      I have informed Jenne Bal and/or legal guardian or family member of the nature of the anesthetic plan, benefits, risks including possible dental damage if relevant, major complications, and any alternat

## 2018-08-07 NOTE — CONSULTS
INFECTIOUS DISEASE CONSULT NOTE    Bonifacio Mclaughlin Patient Status:  Inpatient    6/15/1960 MRN [id number]   Location [facility] 4W/SW/SE Attending Dian Cárdenas MD   Hosp Day # 3 PCP Relation Age of Onset   • Cancer Mother      skin   • Other [OTHER] Mother      Aortic aneurysm (cause of death)   • Heart Disease Father      CAD (cause of death)      reports that she quit smoking about 12 years ago. Her smoking use included Cigarettes. oxyCODONE-acetaminophen (PERCOCET)  MG per tab 1 tablet, 1 tablet, Oral, Q4H PRN  •  Normal Saline Flush 0.9 % injection 3 mL, 3 mL, Intravenous, PRN  •  HYDROmorphone HCl (DILAUDID) 1 MG/ML injection 0.2 mg, 0.2 mg, Intravenous, Q2H PRN **OR** HYDRO auscultation bilaterally. No wheezes. No rhonchi. Cardiovascular: RRR  Abdomen: Soft, nontender, nondistended.    Musculoskeletal: L hip incision with small dehiscence in the middle portion with fibrinous and yellow drainage with surrounding warmth; no tt patient. Please do not hesitate to call if you have any questions. I will continue to follow with you and will make further recommendations based on his progress.     Dell Marcus  8/7/2018

## 2018-08-07 NOTE — PROGRESS NOTES
[facility]OSP - [facility]    Progress Note    Sweetie Sanchez Patient Status:  Inpatient    6/15/1960 MRN [id number]   Location [facility] 4W/SW/SE Attending Julius Martino MD   Hosp Day # 3 PCP Sonal Pa DO       Subjective:     alessandro (L) 07/30/2018   PTT 39.7 (H) 06/15/2018   PTT 39.7 (H) 06/15/2018   INR 1.1 06/07/2018   TSH 1.84 04/23/2018   ESRML 19 08/05/2018   CRP 0.6 07/30/2018   MG 1.9 08/05/2018                   Nayely Ching MD  8/7/2018

## 2018-08-07 NOTE — ANESTHESIA PROCEDURE NOTES
Airway  Date/Time: 8/7/2018 4:40 PM  Urgency: elective      General Information and Staff    Patient location during procedure: OR  Anesthesiologist: Jerrica Lordr  Performed: anesthesiologist     Indications and Patient Condition  Indications for airwa

## 2018-08-07 NOTE — ANESTHESIA POSTPROCEDURE EVALUATION
Patient: Jeannette Wilcox    Procedure Summary     Date:  08/07/18 Room / Location:  [address] [place] [address] MAIN OR 12 / [address] [place] [address] MAIN OR    Anesthesia Start:  3912 Anesthesia Stop:  8458    Procedure:  HIP TOTAL ARTHROPLASTY REVISION (Left ) Diagnosis:  (Left hip osteoarthritis

## 2018-08-07 NOTE — OPERATIVE REPORT
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 We performed a time out, gave antibiotics, and began our procedure. We utilized the prior incision.   We made a skin incision distal and lateral to the ASI, over the left hip and divided the subcutaneous ti physiologic plane of motion, therefore we checked our x-rays and dislocated the hip. We removed the trials, pulse lavaged, washed and dried, and we inserted the final components. I closed reduced the hip and again tested it stability.  It was quite stable t

## 2018-08-08 LAB
ANION GAP SERPL CALC-SCNC: 5 MMOL/L (ref 0–18)
BUN SERPL-MCNC: 12 MG/DL (ref 8–20)
BUN/CREAT SERPL: 15.6 (ref 10–20)
CALCIUM SERPL-MCNC: 8 MG/DL (ref 8.5–10.5)
CHLORIDE SERPL-SCNC: 106 MMOL/L (ref 95–110)
CO2 SERPL-SCNC: 25 MMOL/L (ref 22–32)
CREAT SERPL-MCNC: 0.77 MG/DL (ref 0.5–1.5)
ERYTHROCYTE [DISTWIDTH] IN BLOOD BY AUTOMATED COUNT: 13.2 % (ref 11–15)
GLUCOSE SERPL-MCNC: 112 MG/DL (ref 70–99)
HCT VFR BLD AUTO: 24.6 % (ref 35–48)
HGB BLD-MCNC: 7.8 G/DL (ref 12–16)
MCH RBC QN AUTO: 30.2 PG (ref 27–32)
MCHC RBC AUTO-ENTMCNC: 32 G/DL (ref 32–37)
MCV RBC AUTO: 94.6 FL (ref 80–100)
OSMOLALITY UR CALC.SUM OF ELEC: 283 MOSM/KG (ref 275–295)
PLATELET # BLD AUTO: 231 K/UL (ref 140–400)
PMV BLD AUTO: 9.1 FL (ref 7.4–10.3)
POTASSIUM SERPL-SCNC: 3.8 MMOL/L (ref 3.3–5.1)
RBC # BLD AUTO: 2.59 M/UL (ref 3.7–5.4)
SODIUM SERPL-SCNC: 136 MMOL/L (ref 136–144)
WBC # BLD AUTO: 8.4 K/UL (ref 4–11)

## 2018-08-08 PROCEDURE — 99232 SBSQ HOSP IP/OBS MODERATE 35: CPT | Performed by: HOSPITALIST

## 2018-08-08 RX ORDER — POTASSIUM CHLORIDE 20 MEQ/1
40 TABLET, EXTENDED RELEASE ORAL ONCE
Status: COMPLETED | OUTPATIENT
Start: 2018-08-08 | End: 2018-08-08

## 2018-08-08 NOTE — HOME CARE LIAISON
Met with patient at the bedside. [facility] had [facility] prior to admission for IV abx/PICC management. Patient is agreeable to having home health at discharge again. All questions addressed and answered.     Patient will need new home

## 2018-08-08 NOTE — PHYSICAL THERAPY NOTE
PT PM session: Pt education for bed mobility and transfers with min A for all bed mobility with LLE support. Pt presents with increased anxiety with all mobility and transfers with distractible behavior requiring a quite environment to concentrate.  Pt educ

## 2018-08-08 NOTE — PROGRESS NOTES
Patient overall very comfortable. Happy with surgery and the fact that the true implant was able to be placed. Continue management per Dr. Rajani Knowles and infectious disease.

## 2018-08-08 NOTE — CM/SW NOTE
MD orders received regarding discharge planning. The pt is current with [facility]. Plan is for the pt. To return home and resume [place] [address] services. Specific MD orders are needed prior to discharge.       Ольга RobledoNortheast [facility] ext [phone]

## 2018-08-08 NOTE — CM/SW NOTE
SW received a call from [id number] with [facility] who stated the pt. Is current with [facility] for IV abx at home. The pt. Is also current with [facility] for RN services at home. Plan will be to resume [facility] and IV abx at discharge.   [facility] is aware and medical

## 2018-08-08 NOTE — OCCUPATIONAL THERAPY NOTE
OCCUPATIONAL THERAPY EVALUATION - INPATIENT      Room Number: 403/403-A  Evaluation Date: 8/8/2018  Type of Evaluation: Initial  Presenting Problem: L TRAVIS Revision    Physician Order: IP Consult to Occupational Therapy  Reason for Therapy: ADL/IADL Dysfunc of cyst under left arm\"   • High blood pressure    • Osteoarthritis    • Pilonidal cyst    • Unspecified essential hypertension        Past Surgical History  Past Surgical History:  06-: ELECTROCARDIOGRAM, COMPLETE      Comment: Scanned to Media Ta Bathing (including washing, rinsing, drying)?: A Lot  -   Toileting, which includes using toilet, bedpan or urinal? : A Little  -   Putting on and taking off regular upper body clothing?: None  -   Taking care of personal grooming such as brushing teeth?:

## 2018-08-08 NOTE — PROGRESS NOTES
INFECTIOUS DISEASE PROGRESS NOTE    Saleem Romero Patient Status:  Inpatient    6/15/1960 MRN [id number]   Location [facility] 4W/SW/SE Attending Cece Valentin MD   Hosp Day # 4 PCP Chandrakant Kelley DO     Subjective:  Pain improved.  Hakeem PTA. Taken the OR on 7/3/18 with gross purulence noted intra-op with deep tracking to the prosthesis. Hardware was removed and antibiotic spacer with vancomycin and tobramycin were placed.  Cultures were sent. Carlos Alberto Zavala IV cefazolin perioperatively.  Cx with M

## 2018-08-08 NOTE — PHYSICAL THERAPY NOTE
PHYSICAL THERAPY HIP EVALUATION - INPATIENT     Room Number: 403/403-A  Evaluation Date: 8/8/2018  Type of Evaluation: Initial  Physician Order: PT Eval and Treat    Presenting Problem: THR Revision LLE anterior  Reason for Therapy: Mobility Dysfunction an of cyst under the chin\"   • Dermatological disorder     per [employer]:  \"removal of cyst under left arm\"   • High blood pressure    • Osteoarthritis    • Pilonidal cyst    • Unspecified essential hypertension        Past Surgical History  Past Surgical Hi WNFL    Lower extremity strength is within functional limits LLE 3-/5, RLE 5/5    BALANCE  Static Sitting: Fair  Dynamic Sitting: Fair -  Static Standing: Fair -  Dynamic Standing: [address] TESTS                                 ACTIVITY Mynor Abt self-stated goal is: return home   Goal #1 Patient is able to demonstrate supine - sit EOB @ level: modified independent   Goal #1   Current Status    Goal #2 Patient is able to demonstrate transfers Sit to/from Stand at assistance level: modified independ

## 2018-08-08 NOTE — PROGRESS NOTES
[facility]    Progress Note    Cyndi Wilde Patient Status:  Inpatient    6/15/1960 MRN [id number]   Location [facility] 4W/SW/SE Attending Justice Rubinstein, MD   Hosp Day # 4 PCP Chente Bui DO       Subjective:     No (L) 07/30/2018   PTT 39.7 (H) 06/15/2018   PTT 39.7 (H) 06/15/2018   INR 1.1 06/07/2018   TSH 1.84 04/23/2018   ESRML 19 08/05/2018   CRP 0.6 07/30/2018   MG 1.9 08/05/2018       Xr C-arm Fluoro >1 Hour  (cpt=76001)    Result Date: 8/7/2018  CONCLUSION: Fl

## 2018-08-08 NOTE — PROGRESS NOTES
[address] [facility] Patient Status:  Inpatient    6/15/1960 MRN [id number]   Location [facility] 4W/SW/SE Attending Russ Rock MD   Hosp Day # 4 PCP Chandrakant Kelley DO     Subjective:  Post-Operative Day: 1 Status Post mg Oral Nightly   docusate sodium (COLACE) cap 100 mg 100 mg Oral BID   PEG 3350 (MIRALAX) powder packet 17 g 17 g Oral Daily PRN   ondansetron HCl (ZOFRAN) injection 4 mg 4 mg Intravenous Q4H PRN   Metoclopramide HCl (REGLAN) injection 10 mg 10 mg Alberto Mckeon PRN   Or      HYDROmorphone HCl (DILAUDID) 1 MG/ML injection 0.8 mg 0.8 mg Intravenous Q2H PRN   ondansetron HCl (ZOFRAN) injection 4 mg 4 mg Intravenous Q6H PRN   magnesium hydroxide (MILK OF MAGNESIA) 400 MG/5ML suspension 30 mL 30 mL Oral Daily PRN   bi

## 2018-08-09 LAB
ANTIBODY SCREEN: NEGATIVE
ERYTHROCYTE [DISTWIDTH] IN BLOOD BY AUTOMATED COUNT: 13.3 % (ref 11–15)
HCT VFR BLD AUTO: 19 % (ref 35–48)
HGB BLD-MCNC: 6.3 G/DL (ref 12–16)
HGB BLD-MCNC: 7.7 G/DL (ref 12–16)
MCH RBC QN AUTO: 30.6 PG (ref 27–32)
MCHC RBC AUTO-ENTMCNC: 33.1 G/DL (ref 32–37)
MCV RBC AUTO: 92.4 FL (ref 80–100)
PLATELET # BLD AUTO: 189 K/UL (ref 140–400)
PMV BLD AUTO: 9.2 FL (ref 7.4–10.3)
POTASSIUM SERPL-SCNC: 3.8 MMOL/L (ref 3.3–5.1)
RBC # BLD AUTO: 2.06 M/UL (ref 3.7–5.4)
RH BLOOD TYPE: NEGATIVE
WBC # BLD AUTO: 6.6 K/UL (ref 4–11)

## 2018-08-09 PROCEDURE — 30233N1 TRANSFUSION OF NONAUTOLOGOUS RED BLOOD CELLS INTO PERIPHERAL VEIN, PERCUTANEOUS APPROACH: ICD-10-PCS | Performed by: HOSPITALIST

## 2018-08-09 PROCEDURE — 99233 SBSQ HOSP IP/OBS HIGH 50: CPT | Performed by: HOSPITALIST

## 2018-08-09 RX ORDER — 0.9 % SODIUM CHLORIDE 0.9 %
VIAL (ML) INJECTION
Status: COMPLETED
Start: 2018-08-09 | End: 2018-08-09

## 2018-08-09 RX ORDER — SODIUM CHLORIDE 0.9 % (FLUSH) 0.9 %
10 SYRINGE (ML) INJECTION AS NEEDED
Status: DISCONTINUED | OUTPATIENT
Start: 2018-08-09 | End: 2018-08-10

## 2018-08-09 RX ORDER — ASPIRIN 325 MG
325 TABLET, DELAYED RELEASE (ENTERIC COATED) ORAL 2 TIMES DAILY
Qty: 70 TABLET | Refills: 0 | Status: SHIPPED | OUTPATIENT
Start: 2018-08-09 | End: 2018-11-13

## 2018-08-09 RX ORDER — HYDROCODONE BITARTRATE AND ACETAMINOPHEN 10; 325 MG/1; MG/1
1-2 TABLET ORAL EVERY 4 HOURS PRN
Qty: 60 TABLET | Refills: 0 | Status: SHIPPED | OUTPATIENT
Start: 2018-08-09 | End: 2018-11-20

## 2018-08-09 RX ORDER — SODIUM CHLORIDE 9 MG/ML
INJECTION, SOLUTION INTRAVENOUS ONCE
Status: DISCONTINUED | OUTPATIENT
Start: 2018-08-09 | End: 2018-08-10

## 2018-08-09 RX ORDER — SODIUM CHLORIDE 9 MG/ML
INJECTION, SOLUTION INTRAVENOUS
Status: COMPLETED
Start: 2018-08-09 | End: 2018-08-09

## 2018-08-09 RX ORDER — POTASSIUM CHLORIDE 20 MEQ/1
40 TABLET, EXTENDED RELEASE ORAL ONCE
Status: COMPLETED | OUTPATIENT
Start: 2018-08-09 | End: 2018-08-09

## 2018-08-09 NOTE — CM/SW NOTE
322pm- The pt is not going to discharge today 8/9- plan is for discharge tomorrow 8/10. [facility] and [facility] are aware. [facility] to deliver today 8/9.  ------------------------------  MD order received regarding Mary Annkatu 78 orders. The plan is for the pt.  To discharge home

## 2018-08-09 NOTE — PHYSICAL THERAPY NOTE
PHYSICAL THERAPY HIP TREATMENT NOTE - INPATIENT      Room Number: 403/403-A            Presenting Problem: THR Revision LLE anterior    Problem List  Principal Problem:    Prosthetic hip implant failure, initial encounter [facility])  Active Problems:    Prosthe standing up from a chair with arms (e.g., wheelchair, bedside commode, etc.): A Little   -   Moving from lying on back to sitting on the side of the bed?: A Little   How much help from another person does the patient currently need. ..   -   Moving to and f

## 2018-08-09 NOTE — OCCUPATIONAL THERAPY NOTE
OCCUPATIONAL THERAPY TREATMENT NOTE - INPATIENT    Room Number: 403/403-A         Presenting Problem: L TRAVIS Revision     Problem List  Principal Problem:    Prosthetic hip implant failure, initial encounter [facility])  Active Problems:    Prosthetic hip implant rinsing, drying)?: A Little  -   Toileting, which includes using toilet, bedpan or urinal? : None  -   Putting on and taking off regular upper body clothing?: None  -   Taking care of personal grooming such as brushing teeth?: None  -   Eating meals?: None

## 2018-08-09 NOTE — PROGRESS NOTES
[facility]    Progress Note    Jeniseanttopher Rueda Patient Status:  Inpatient    6/15/1960 MRN [id number]   Location [facility] 4W/SW/SE Attending Jesse Bond MD   Hosp Day # 5 PCP Marcio Andersen DO       Subjective:     No above.    Results:       Lab Results  Component Value Date   WBC 6.6 08/09/2018   HGB 7.7 (L) 08/09/2018   HCT 19.0 (L) 08/09/2018    08/09/2018   CREATSERUM 0.77 08/08/2018   BUN 12 08/08/2018    08/08/2018   K 3.8 08/09/2018    08/08/2

## 2018-08-09 NOTE — PROGRESS NOTES
[address] [facility] Patient Status:  Inpatient    6/15/1960 MRN [id number]   Location [facility] 4W/SW/SE Attending Coleman Alex MD   Hosp Day # 5 PCP Nicola Alberto DO     Subjective:  Post-Operative Day: 2 Status Post Intravenous Continuous   apixaban (ELIQUIS) tab 2.5 mg 2.5 mg Oral BID   Senna (SENOKOT) tab TABS 17.2 mg 17.2 mg Oral Nightly   docusate sodium (COLACE) cap 100 mg 100 mg Oral BID   PEG 3350 (MIRALAX) powder packet 17 g 17 g Oral Daily PRN   ondansetron H Intravenous Q2H PRN   ondansetron HCl (ZOFRAN) injection 4 mg 4 mg Intravenous Q6H PRN   magnesium hydroxide (MILK OF MAGNESIA) 400 MG/5ML suspension 30 mL 30 mL Oral Daily PRN   bisacodyl (DULCOLAX) rectal suppository 10 mg 10 mg Rectal Daily PRN   FLEET

## 2018-08-09 NOTE — PHYSICAL THERAPY NOTE
PT PM session: Pt and family education completed for stair training up / down 15 steps with 1 side rail support with min A. Daughter present for training to assist her mother upon D/C for the first week 1/2 before she goes back to college.  Pt gait training

## 2018-08-09 NOTE — PROGRESS NOTES
INFECTIOUS DISEASE PROGRESS NOTE    Mo Rodrigues Patient Status:  Inpatient    6/15/1960 MRN [id number]   Location [facility] 4W/SW/SE Attending Wei Muñoz MD   Hosp Day # 5 PCP Chandrakant Kelley DO     Subjective:  Afebrile. In chair. starting end of last week that was serosang, not cloudy or purulent. Today more hip pain. No fevers or chills or trauma. No abx PTA. Taken the OR on 7/3/18 with gross purulence noted intra-op with deep tracking to the prosthesis.  Hardware was removed and a

## 2018-08-10 ENCOUNTER — APPOINTMENT (OUTPATIENT)
Dept: CT IMAGING | Facility: HOSPITAL | Age: 58
DRG: 467 | End: 2018-08-10
Attending: PHYSICIAN ASSISTANT
Payer: COMMERCIAL

## 2018-08-10 ENCOUNTER — TELEPHONE (OUTPATIENT)
Dept: FAMILY MEDICINE CLINIC | Facility: CLINIC | Age: 58
End: 2018-08-10

## 2018-08-10 VITALS
HEART RATE: 101 BPM | RESPIRATION RATE: 16 BRPM | OXYGEN SATURATION: 99 % | TEMPERATURE: 99 F | WEIGHT: 195 LBS | BODY MASS INDEX: 30.61 KG/M2 | SYSTOLIC BLOOD PRESSURE: 123 MMHG | HEIGHT: 67 IN | DIASTOLIC BLOOD PRESSURE: 75 MMHG

## 2018-08-10 LAB
BLOOD TYPE BARCODE: 600
ERYTHROCYTE [DISTWIDTH] IN BLOOD BY AUTOMATED COUNT: 13.6 % (ref 11–15)
HCT VFR BLD AUTO: 20.9 % (ref 35–48)
HGB BLD-MCNC: 7 G/DL (ref 12–16)
HGB BLD-MCNC: 7.7 G/DL (ref 12–16)
MCH RBC QN AUTO: 30.2 PG (ref 27–32)
MCHC RBC AUTO-ENTMCNC: 33.4 G/DL (ref 32–37)
MCV RBC AUTO: 90.6 FL (ref 80–100)
PLATELET # BLD AUTO: 187 K/UL (ref 140–400)
PMV BLD AUTO: 9.4 FL (ref 7.4–10.3)
POTASSIUM SERPL-SCNC: 3.8 MMOL/L (ref 3.3–5.1)
RBC # BLD AUTO: 2.3 M/UL (ref 3.7–5.4)
WBC # BLD AUTO: 6.3 K/UL (ref 4–11)

## 2018-08-10 PROCEDURE — 99233 SBSQ HOSP IP/OBS HIGH 50: CPT | Performed by: HOSPITALIST

## 2018-08-10 PROCEDURE — 73700 CT LOWER EXTREMITY W/O DYE: CPT | Performed by: PHYSICIAN ASSISTANT

## 2018-08-10 RX ORDER — IRON POLYSACCHARIDE COMPLEX 150 MG
150 CAPSULE ORAL 2 TIMES DAILY WITH MEALS
Status: DISCONTINUED | OUTPATIENT
Start: 2018-08-10 | End: 2018-08-10

## 2018-08-10 RX ORDER — POTASSIUM CHLORIDE 20 MEQ/1
40 TABLET, EXTENDED RELEASE ORAL ONCE
Status: COMPLETED | OUTPATIENT
Start: 2018-08-10 | End: 2018-08-10

## 2018-08-10 RX ORDER — 0.9 % SODIUM CHLORIDE 0.9 %
VIAL (ML) INJECTION
Status: COMPLETED
Start: 2018-08-10 | End: 2018-08-10

## 2018-08-10 NOTE — PROGRESS NOTES
[facility]    Progress Note    Vazquez Meneses Patient Status:  Inpatient    6/15/1960 MRN [id number]   Location [facility] 4W/SW/SE Attending Coleman Alex MD   Hosp Day # 6 PCP Chandrakant Kelley DO       Subjective:     No as outlined above.     Results:       Lab Results  Component Value Date   WBC 6.3 08/10/2018   HGB 7.0 (LL) 08/10/2018   HCT 20.9 (L) 08/10/2018    08/10/2018   CREATSERUM 0.77 08/08/2018   BUN 12 08/08/2018    08/08/2018   K 3.8 08/10/2018   C

## 2018-08-10 NOTE — PROGRESS NOTES
[address] [facility] Patient Status:  Inpatient    6/15/1960 MRN [id number]   Location [facility] 4W/SW/SE Attending Coleman Alex MD   Hosp Day # 6 PCP Chandrakant Kelley DO     Subjective:  Post-Operative Day: 3 Status Post Intravenous Q2H PRN   Or      morphINE sulfate (PF) 4 MG/ML injection 4 mg 4 mg Intravenous Q2H PRN   HYDROmorphone HCl (DILAUDID) 1 MG/ML injection 1 mg 1 mg Intravenous Q30 Min PRN   Albuterol Sulfate  (90 Base) MCG/ACT inhaler 2 puff 2 puff Inhal hospital  Ecotrin 325 mg PO BID upon discharge to home/rehab  Dressing may be changed in 3-5 days or sooner if there is drainage.  Incision to remain covered and dry until seen in clinic     LOS: 6 days

## 2018-08-10 NOTE — OCCUPATIONAL THERAPY NOTE
OCCUPATIONAL THERAPY TREATMENT NOTE - INPATIENT    Room Number: 403/403-A         Presenting Problem: L TRAVIS Revision     Problem List  Principal Problem:    Prosthetic hip implant failure, initial encounter [facility])  Active Problems:    Prosthetic hip implant None  -   Taking care of personal grooming such as brushing teeth?: None  -   Eating meals?: None    AM-PAC Score:  Score: 22  Approx Degree of Impairment: 25.8%  Standardized Score (AM-PAC Scale): 47.1  CMS Modifier (G-Code): ANGELA    FUNCTIONAL TRANSFER ASS

## 2018-08-10 NOTE — PHYSICAL THERAPY NOTE
PHYSICAL THERAPY HIP TREATMENT NOTE - INPATIENT    Room Number: 403/403-A            Presenting Problem: THR Revision LLE anterior    Problem List  Principal Problem:    Prosthetic hip implant failure, initial encounter [facility])  Active Problems:    Karolyn Velasquez Activity promotion; Body mechanics; Relaxation;Repositioning    BALANCE  Static Sitting: Good  Dynamic Sitting: Good  Static Standing: Fair +  Dynamic Standing: Fair  ACTIVITY TOLERANCE  Room air    AM-PAC '6-Clicks' INPATIENT SHORT FORM - [address]  independent   Goal #1   Current Status NT   Goal #2 Patient is able to demonstrate transfers Sit to/from Stand at assistance level: modified independent      Goal #2  Current Status  SBA with RW from the bs chair.  Am/pm session   Goal #3 Patient is able to

## 2018-08-11 NOTE — PROGRESS NOTES
INFECTIOUS DISEASE PROGRESS NOTE    Angi Edwards Patient Status:  Inpatient    6/15/1960 MRN [id number]   Location [facility] 4W/SW/SE Attending Mega Fam MD   Hosp Day # 6 PCP Chandrakant Kelley DO     Subjective:  Hgb noted.  Having L initially well postoperatively but now noted to have persistent drainage from the hip wound starting end of last week that was serosang, not cloudy or purulent. Today more hip pain. No fevers or chills or trauma. No abx PTA.  Taken the OR on 7/3/18 with key

## 2018-08-14 ENCOUNTER — LAB REQUISITION (OUTPATIENT)
Dept: LAB | Facility: HOSPITAL | Age: 58
End: 2018-08-14
Payer: COMMERCIAL

## 2018-08-14 ENCOUNTER — TELEPHONE (OUTPATIENT)
Dept: FAMILY MEDICINE CLINIC | Facility: CLINIC | Age: 58
End: 2018-08-14

## 2018-08-14 DIAGNOSIS — Z47.32 AFTERCARE FOLLOWING EXPLANTATION OF HIP JOINT PROSTHESIS: ICD-10-CM

## 2018-08-14 LAB
ALBUMIN SERPL BCP-MCNC: 3 G/DL (ref 3.5–4.8)
ALBUMIN/GLOB SERPL: 1.1 {RATIO} (ref 1–2)
ALP SERPL-CCNC: 94 U/L (ref 32–100)
ALT SERPL-CCNC: 10 U/L (ref 14–54)
ANION GAP SERPL CALC-SCNC: 11 MMOL/L (ref 0–18)
AST SERPL-CCNC: 22 U/L (ref 15–41)
BASOPHILS # BLD: 0.1 K/UL (ref 0–0.2)
BASOPHILS NFR BLD: 1 %
BILIRUB SERPL-MCNC: 0.4 MG/DL (ref 0.3–1.2)
BUN SERPL-MCNC: 6 MG/DL (ref 8–20)
BUN/CREAT SERPL: 10 (ref 10–20)
CALCIUM SERPL-MCNC: 8.9 MG/DL (ref 8.5–10.5)
CHLORIDE SERPL-SCNC: 104 MMOL/L (ref 95–110)
CO2 SERPL-SCNC: 24 MMOL/L (ref 22–32)
CREAT SERPL-MCNC: 0.6 MG/DL (ref 0.5–1.5)
CRP SERPL HS-MCNC: 10.2 MG/L (ref 0–7.5)
EOSINOPHIL # BLD: 0.4 K/UL (ref 0–0.7)
EOSINOPHIL NFR BLD: 5 %
ERYTHROCYTE [DISTWIDTH] IN BLOOD BY AUTOMATED COUNT: 14.1 % (ref 11–15)
ERYTHROCYTE [SEDIMENTATION RATE] IN BLOOD: 37 MM/HR (ref 0–30)
GLOBULIN PLAS-MCNC: 2.8 G/DL (ref 2.5–3.7)
GLUCOSE SERPL-MCNC: 89 MG/DL (ref 70–99)
HCT VFR BLD AUTO: 23.8 % (ref 35–48)
HGB BLD-MCNC: 7.8 G/DL (ref 12–16)
LYMPHOCYTES # BLD: 1.4 K/UL (ref 1–4)
LYMPHOCYTES NFR BLD: 19 %
MCH RBC QN AUTO: 30.2 PG (ref 27–32)
MCHC RBC AUTO-ENTMCNC: 32.9 G/DL (ref 32–37)
MCV RBC AUTO: 91.8 FL (ref 80–100)
MONOCYTES # BLD: 0.6 K/UL (ref 0–1)
MONOCYTES NFR BLD: 9 %
NEUTROPHILS # BLD AUTO: 5 K/UL (ref 1.8–7.7)
NEUTROPHILS NFR BLD: 66 %
OSMOLALITY UR CALC.SUM OF ELEC: 285 MOSM/KG (ref 275–295)
PLATELET # BLD AUTO: 357 K/UL (ref 140–400)
PMV BLD AUTO: 9.2 FL (ref 7.4–10.3)
POTASSIUM SERPL-SCNC: 3.7 MMOL/L (ref 3.3–5.1)
PROT SERPL-MCNC: 5.8 G/DL (ref 5.9–8.4)
RBC # BLD AUTO: 2.6 M/UL (ref 3.7–5.4)
SODIUM SERPL-SCNC: 139 MMOL/L (ref 136–144)
WBC # BLD AUTO: 7.5 K/UL (ref 4–11)

## 2018-08-14 PROCEDURE — 85025 COMPLETE CBC W/AUTO DIFF WBC: CPT | Performed by: INTERNAL MEDICINE

## 2018-08-14 PROCEDURE — 85652 RBC SED RATE AUTOMATED: CPT | Performed by: INTERNAL MEDICINE

## 2018-08-14 PROCEDURE — 86141 C-REACTIVE PROTEIN HS: CPT | Performed by: INTERNAL MEDICINE

## 2018-08-14 PROCEDURE — 80053 COMPREHEN METABOLIC PANEL: CPT | Performed by: INTERNAL MEDICINE

## 2018-08-14 NOTE — TELEPHONE ENCOUNTER
Spoke to patient in regards to scheduling up a TCM appointment  Patient would prefer to wait and see the other specialists.   She will call back to schedule if necessary

## 2018-08-20 ENCOUNTER — HOSPITAL ENCOUNTER (OUTPATIENT)
Dept: ULTRASOUND IMAGING | Facility: HOSPITAL | Age: 58
Discharge: HOME | End: 2018-08-20
Attending: PHYSICIAN ASSISTANT
Payer: COMMERCIAL

## 2018-08-20 ENCOUNTER — LAB REQUISITION (OUTPATIENT)
Dept: LAB | Facility: HOSPITAL | Age: 58
End: 2018-08-20
Payer: COMMERCIAL

## 2018-08-20 DIAGNOSIS — M25.572 LEFT ANKLE PAIN: ICD-10-CM

## 2018-08-20 DIAGNOSIS — M25.552 LEFT HIP PAIN: ICD-10-CM

## 2018-08-20 DIAGNOSIS — Z96.642 PRESENCE OF LEFT ARTIFICIAL HIP JOINT: ICD-10-CM

## 2018-08-20 DIAGNOSIS — Z47.32 AFTERCARE FOLLOWING EXPLANTATION OF HIP JOINT PROSTHESIS: ICD-10-CM

## 2018-08-20 DIAGNOSIS — I82.90 THROMBUS: ICD-10-CM

## 2018-08-20 DIAGNOSIS — Z47.1 AFTERCARE FOLLOWING JOINT REPLACEMENT: ICD-10-CM

## 2018-08-20 LAB
ALBUMIN SERPL BCP-MCNC: 3.4 G/DL (ref 3.5–4.8)
ALBUMIN/GLOB SERPL: 1.1 {RATIO} (ref 1–2)
ALP SERPL-CCNC: 143 U/L (ref 32–100)
ALT SERPL-CCNC: 7 U/L (ref 14–54)
ANION GAP SERPL CALC-SCNC: 9 MMOL/L (ref 0–18)
AST SERPL-CCNC: 32 U/L (ref 15–41)
BASOPHILS # BLD: 0.1 K/UL (ref 0–0.2)
BASOPHILS NFR BLD: 1 %
BILIRUB SERPL-MCNC: 0.4 MG/DL (ref 0.3–1.2)
BUN SERPL-MCNC: 9 MG/DL (ref 8–20)
BUN/CREAT SERPL: 15.5 (ref 10–20)
CALCIUM SERPL-MCNC: 8.6 MG/DL (ref 8.5–10.5)
CHLORIDE SERPL-SCNC: 106 MMOL/L (ref 95–110)
CO2 SERPL-SCNC: 24 MMOL/L (ref 22–32)
CREAT SERPL-MCNC: 0.58 MG/DL (ref 0.5–1.5)
CRP SERPL-MCNC: 0.5 MG/DL (ref 0–0.9)
EOSINOPHIL # BLD: 0.4 K/UL (ref 0–0.7)
EOSINOPHIL NFR BLD: 6 %
ERYTHROCYTE [DISTWIDTH] IN BLOOD BY AUTOMATED COUNT: 15.3 % (ref 11–15)
ERYTHROCYTE [SEDIMENTATION RATE] IN BLOOD: 17 MM/HR (ref 0–30)
GLOBULIN PLAS-MCNC: 3 G/DL (ref 2.5–3.7)
GLUCOSE SERPL-MCNC: 93 MG/DL (ref 70–99)
HCT VFR BLD AUTO: 27.6 % (ref 35–48)
HGB BLD-MCNC: 8.7 G/DL (ref 12–16)
LYMPHOCYTES # BLD: 1.3 K/UL (ref 1–4)
LYMPHOCYTES NFR BLD: 16 %
MCH RBC QN AUTO: 29.5 PG (ref 27–32)
MCHC RBC AUTO-ENTMCNC: 31.4 G/DL (ref 32–37)
MCV RBC AUTO: 94 FL (ref 80–100)
MONOCYTES # BLD: 0.5 K/UL (ref 0–1)
MONOCYTES NFR BLD: 7 %
NEUTROPHILS # BLD AUTO: 5.6 K/UL (ref 1.8–7.7)
NEUTROPHILS NFR BLD: 71 %
OSMOLALITY UR CALC.SUM OF ELEC: 286 MOSM/KG (ref 275–295)
PLATELET # BLD AUTO: 331 K/UL (ref 140–400)
PMV BLD AUTO: 8.4 FL (ref 7.4–10.3)
POTASSIUM SERPL-SCNC: 3.4 MMOL/L (ref 3.3–5.1)
PROT SERPL-MCNC: 6.4 G/DL (ref 5.9–8.4)
RBC # BLD AUTO: 2.94 M/UL (ref 3.7–5.4)
SODIUM SERPL-SCNC: 139 MMOL/L (ref 136–144)
WBC # BLD AUTO: 7.9 K/UL (ref 4–11)

## 2018-08-20 PROCEDURE — 80053 COMPREHEN METABOLIC PANEL: CPT | Performed by: INTERNAL MEDICINE

## 2018-08-20 PROCEDURE — 86140 C-REACTIVE PROTEIN: CPT | Performed by: INTERNAL MEDICINE

## 2018-08-20 PROCEDURE — 93971 EXTREMITY STUDY: CPT | Performed by: PHYSICIAN ASSISTANT

## 2018-08-20 PROCEDURE — 85652 RBC SED RATE AUTOMATED: CPT | Performed by: INTERNAL MEDICINE

## 2018-08-20 PROCEDURE — 85025 COMPLETE CBC W/AUTO DIFF WBC: CPT | Performed by: INTERNAL MEDICINE

## 2018-08-27 ENCOUNTER — LAB REQUISITION (OUTPATIENT)
Dept: LAB | Facility: HOSPITAL | Age: 58
End: 2018-08-27
Payer: COMMERCIAL

## 2018-08-27 DIAGNOSIS — Z47.32 AFTERCARE FOLLOWING EXPLANTATION OF HIP JOINT PROSTHESIS: ICD-10-CM

## 2018-08-27 LAB
ALBUMIN SERPL BCP-MCNC: 3.6 G/DL (ref 3.5–4.8)
ALBUMIN/GLOB SERPL: 1.2 {RATIO} (ref 1–2)
ALP SERPL-CCNC: 142 U/L (ref 32–100)
ALT SERPL-CCNC: 12 U/L (ref 14–54)
ANION GAP SERPL CALC-SCNC: 8 MMOL/L (ref 0–18)
AST SERPL-CCNC: 42 U/L (ref 15–41)
BASOPHILS # BLD: 0.1 K/UL (ref 0–0.2)
BASOPHILS NFR BLD: 1 %
BILIRUB SERPL-MCNC: 0.5 MG/DL (ref 0.3–1.2)
BUN SERPL-MCNC: 8 MG/DL (ref 8–20)
BUN/CREAT SERPL: 12.5 (ref 10–20)
CALCIUM SERPL-MCNC: 9.2 MG/DL (ref 8.5–10.5)
CHLORIDE SERPL-SCNC: 106 MMOL/L (ref 95–110)
CO2 SERPL-SCNC: 26 MMOL/L (ref 22–32)
CREAT SERPL-MCNC: 0.64 MG/DL (ref 0.5–1.5)
CRP SERPL-MCNC: 0.6 MG/DL (ref 0–0.9)
EOSINOPHIL # BLD: 0.5 K/UL (ref 0–0.7)
EOSINOPHIL NFR BLD: 9 %
ERYTHROCYTE [DISTWIDTH] IN BLOOD BY AUTOMATED COUNT: 15 % (ref 11–15)
ERYTHROCYTE [SEDIMENTATION RATE] IN BLOOD: 16 MM/HR (ref 0–30)
GLOBULIN PLAS-MCNC: 3.1 G/DL (ref 2.5–3.7)
GLUCOSE SERPL-MCNC: 85 MG/DL (ref 70–99)
HCT VFR BLD AUTO: 30.1 % (ref 35–48)
HGB BLD-MCNC: 9.4 G/DL (ref 12–16)
LYMPHOCYTES # BLD: 1 K/UL (ref 1–4)
LYMPHOCYTES NFR BLD: 17 %
MCH RBC QN AUTO: 28.9 PG (ref 27–32)
MCHC RBC AUTO-ENTMCNC: 31.3 G/DL (ref 32–37)
MCV RBC AUTO: 92.1 FL (ref 80–100)
MONOCYTES # BLD: 0.6 K/UL (ref 0–1)
MONOCYTES NFR BLD: 10 %
NEUTROPHILS # BLD AUTO: 3.6 K/UL (ref 1.8–7.7)
NEUTROPHILS NFR BLD: 63 %
OSMOLALITY UR CALC.SUM OF ELEC: 288 MOSM/KG (ref 275–295)
PLATELET # BLD AUTO: 252 K/UL (ref 140–400)
PMV BLD AUTO: 8.5 FL (ref 7.4–10.3)
POTASSIUM SERPL-SCNC: 3.6 MMOL/L (ref 3.3–5.1)
PROT SERPL-MCNC: 6.7 G/DL (ref 5.9–8.4)
RBC # BLD AUTO: 3.26 M/UL (ref 3.7–5.4)
SODIUM SERPL-SCNC: 140 MMOL/L (ref 136–144)
WBC # BLD AUTO: 5.7 K/UL (ref 4–11)

## 2018-08-27 PROCEDURE — 86140 C-REACTIVE PROTEIN: CPT | Performed by: INTERNAL MEDICINE

## 2018-08-27 PROCEDURE — 85652 RBC SED RATE AUTOMATED: CPT | Performed by: INTERNAL MEDICINE

## 2018-08-27 PROCEDURE — 85025 COMPLETE CBC W/AUTO DIFF WBC: CPT | Performed by: INTERNAL MEDICINE

## 2018-08-27 PROCEDURE — 80053 COMPREHEN METABOLIC PANEL: CPT | Performed by: INTERNAL MEDICINE

## 2018-08-29 ENCOUNTER — TELEPHONE (OUTPATIENT)
Dept: CASE MANAGEMENT | Age: 58
End: 2018-08-29

## 2018-08-29 NOTE — TELEPHONE ENCOUNTER
Discussed w/pt the need for colorectal screening. Patient declined to schedule gastro appointment as she states that she had 3 surgeries this summer.

## 2018-09-06 ENCOUNTER — OFFICE VISIT (OUTPATIENT)
Dept: PHYSICAL THERAPY | Age: 58
End: 2018-09-06
Attending: ORTHOPAEDIC SURGERY
Payer: COMMERCIAL

## 2018-09-06 DIAGNOSIS — Z96.642 S/P HIP REPLACEMENT, LEFT: ICD-10-CM

## 2018-09-06 PROCEDURE — 97162 PT EVAL MOD COMPLEX 30 MIN: CPT | Performed by: PHYSICAL THERAPIST

## 2018-09-06 PROCEDURE — 97530 THERAPEUTIC ACTIVITIES: CPT | Performed by: PHYSICAL THERAPIST

## 2018-09-06 NOTE — PROGRESS NOTES
P.T. EVALUATION:   Referring Physician: Dr. Glenis Larson  Diagnosis: Presence of left artificial hip joint (F99.419)    Date of surgery: 8/7/2018 Date of Service: 9/6/2018     PATIENT SUMMARY   Darius Weeks is a 66year old y/o female who presents to  sitting. Balance: WFL    Gait: Walks independently with cane or rolling walker. Goes up and down stairs one step at a time. Today’s Treatment and Response:  Patient education provided on PT eval findings, treatment plan, goals, HEP.   Patient received these services furnished under this plan of treatment and while under my care.       X___________________________________________________ Date____________________    Certification From: 6/2/6840  To:12/5/2018

## 2018-09-10 ENCOUNTER — OFFICE VISIT (OUTPATIENT)
Dept: PHYSICAL THERAPY | Age: 58
End: 2018-09-10
Attending: ORTHOPAEDIC SURGERY
Payer: COMMERCIAL

## 2018-09-10 DIAGNOSIS — Z96.642 PRESENCE OF LEFT ARTIFICIAL HIP JOINT: ICD-10-CM

## 2018-09-10 PROCEDURE — 97110 THERAPEUTIC EXERCISES: CPT

## 2018-09-10 NOTE — PROGRESS NOTES
Diagnosis: Presence of left artificial hip joint (M39.958)    Date of surgery: 8/7/2018        Next MD visit: 9/10/18  Fall Risk: standard         Precautions: n/a          Medication Changes since last visit?: No    Subjective: Patient reports 1-2/10 L hi

## 2018-09-11 ENCOUNTER — TELEPHONE (OUTPATIENT)
Dept: ORTHOPEDICS CLINIC | Facility: CLINIC | Age: 58
End: 2018-09-11

## 2018-09-11 NOTE — TELEPHONE ENCOUNTER
Pt calling to confirm that a referral was put in for pt to have home health. Pt states residential home health is telling here there is no referral. Please advise. Thank you.

## 2018-09-12 ENCOUNTER — OFFICE VISIT (OUTPATIENT)
Dept: PHYSICAL THERAPY | Age: 58
End: 2018-09-12
Attending: ORTHOPAEDIC SURGERY
Payer: COMMERCIAL

## 2018-09-12 PROCEDURE — 97110 THERAPEUTIC EXERCISES: CPT

## 2018-09-12 NOTE — PROGRESS NOTES
Diagnosis: Presence of left artificial hip joint (L69.710)    Date of surgery: 8/7/2018        Next MD visit: 9/10/18  Fall Risk: standard         Precautions: n/a          Medication Changes since last visit?: No    Subjective: Patient reports 2/10 L hip

## 2018-09-13 ENCOUNTER — OFFICE VISIT (OUTPATIENT)
Dept: FAMILY MEDICINE CLINIC | Facility: CLINIC | Age: 58
End: 2018-09-13

## 2018-09-13 VITALS
WEIGHT: 194 LBS | DIASTOLIC BLOOD PRESSURE: 84 MMHG | HEIGHT: 67 IN | HEART RATE: 90 BPM | TEMPERATURE: 98 F | BODY MASS INDEX: 30.45 KG/M2 | SYSTOLIC BLOOD PRESSURE: 130 MMHG

## 2018-09-13 DIAGNOSIS — B35.1 ONYCHOMYCOSIS OF TOENAIL: ICD-10-CM

## 2018-09-13 DIAGNOSIS — L23.1 CONTACT DERMATITIS DUE TO ADHESIVES, UNSPECIFIED CONTACT DERMATITIS TYPE: ICD-10-CM

## 2018-09-13 DIAGNOSIS — I10 ESSENTIAL HYPERTENSION, BENIGN: ICD-10-CM

## 2018-09-13 DIAGNOSIS — M16.12 PRIMARY OSTEOARTHRITIS OF LEFT HIP: Primary | ICD-10-CM

## 2018-09-13 DIAGNOSIS — T84.018A PROSTHETIC HIP IMPLANT FAILURE, INITIAL ENCOUNTER (HCC): ICD-10-CM

## 2018-09-13 DIAGNOSIS — Z96.649 PROSTHETIC HIP IMPLANT FAILURE, INITIAL ENCOUNTER (HCC): ICD-10-CM

## 2018-09-13 DIAGNOSIS — M25.552 PAIN OF LEFT HIP JOINT: ICD-10-CM

## 2018-09-13 DIAGNOSIS — E55.9 VITAMIN D DEFICIENCY: ICD-10-CM

## 2018-09-13 PROCEDURE — 99214 OFFICE O/P EST MOD 30 MIN: CPT | Performed by: FAMILY MEDICINE

## 2018-09-13 PROCEDURE — 99212 OFFICE O/P EST SF 10 MIN: CPT | Performed by: FAMILY MEDICINE

## 2018-09-13 NOTE — PROGRESS NOTES
Patient ID: Kwadwo Angel is a 66year old female. HPI  Patient presents with: Follow - Up  Referral  She states physical therapy has been helping.   She states she feels more soreness in the muscle but it is much better now considering that she had essential hypertension        Past Surgical History:  06-: ELECTROCARDIOGRAM, COMPLETE      Comment:  Scanned to Media Tab - Date of Service 06-  7/3/2018: Jeni;  Left      Comment:  Performed by Cuong Abdi, currently breastfeeding. Physical Exam   Constitutional: Patient is oriented to person, place, and time. Patient appears well-developed and well-nourished. No distress. HENT:   Head: Normocephalic and atraumatic. Neck: Normal range of motion.  Neck sup you do this before you have any coffee or caffeine. Go ahead and write these numbers down a piece of paper for me. If your numbers are consistently above 140/90 you should let me know.  (There are different size cuffs but the pharmacist can help you decid Sandy Palomo, DO  9/13/2018

## 2018-09-13 NOTE — PATIENT INSTRUCTIONS
BLOOD PRESSURE CUFF    Get a blood pressure cuff that goes on the arm. Do not get the wrist cuff. Check your blood pressures on your left arm 2-3 times per week at home.   Do this with your left arm supported by a tab

## 2018-09-14 NOTE — TELEPHONE ENCOUNTER
Dr. Abbey Boss, just to be clear, I am writing an order for [facility] for s/p hip replacement. Is there any specific directions you want on this order?

## 2018-09-17 ENCOUNTER — OFFICE VISIT (OUTPATIENT)
Dept: PHYSICAL THERAPY | Age: 58
End: 2018-09-17
Attending: ORTHOPAEDIC SURGERY
Payer: COMMERCIAL

## 2018-09-17 ENCOUNTER — OFFICE VISIT (OUTPATIENT)
Dept: PODIATRY CLINIC | Facility: CLINIC | Age: 58
End: 2018-09-17

## 2018-09-17 DIAGNOSIS — B35.1 ONYCHOMYCOSIS: Primary | ICD-10-CM

## 2018-09-17 PROCEDURE — 99212 OFFICE O/P EST SF 10 MIN: CPT | Performed by: PODIATRIST

## 2018-09-17 PROCEDURE — 11721 DEBRIDE NAIL 6 OR MORE: CPT | Performed by: PODIATRIST

## 2018-09-17 PROCEDURE — 97110 THERAPEUTIC EXERCISES: CPT | Performed by: PHYSICAL THERAPIST

## 2018-09-17 NOTE — PROGRESS NOTES
Diagnosis: Presence of left artificial hip joint (Z01.858)    Date of surgery: 8/7/2018        Next MD visit: 9/10/18  Fall Risk: standard         Precautions: n/a          Medication Changes since last visit?: No    Subjective: Patient reports 2/10 L hip

## 2018-09-17 NOTE — PROGRESS NOTES
HPI:    Patient ID: Jeannette Wilcox is a 66year old female. HPI  This 28-year-old female presents to the office today having not been seen in approximately 5 months.   She is here to discuss options of care in reference to chronic long-standing fungus recurrent procedure. She is approximately 1 month out from the replacement revision. I do not consider a candidate presently as she is on antibiotics long-term.   I plan to reevaluate this patient in a few months to consider other options depending upon h

## 2018-09-19 ENCOUNTER — OFFICE VISIT (OUTPATIENT)
Dept: PHYSICAL THERAPY | Age: 58
End: 2018-09-19
Attending: ORTHOPAEDIC SURGERY
Payer: COMMERCIAL

## 2018-09-19 PROCEDURE — 97110 THERAPEUTIC EXERCISES: CPT | Performed by: PHYSICAL THERAPIST

## 2018-09-19 NOTE — PROGRESS NOTES
Diagnosis: Presence of left artificial hip joint (X08.901)    Date of surgery: 8/7/2018        Next MD visit: 9/10/18  Fall Risk: standard         Precautions: n/a          Medication Changes since last visit?: No    Subjective: Patient states she is [employer]

## 2018-09-24 ENCOUNTER — LAB ENCOUNTER (OUTPATIENT)
Dept: LAB | Age: 58
End: 2018-09-24
Attending: INTERNAL MEDICINE
Payer: COMMERCIAL

## 2018-09-24 ENCOUNTER — OFFICE VISIT (OUTPATIENT)
Dept: PHYSICAL THERAPY | Age: 58
End: 2018-09-24
Attending: ORTHOPAEDIC SURGERY
Payer: COMMERCIAL

## 2018-09-24 DIAGNOSIS — T84.52XA: Primary | ICD-10-CM

## 2018-09-24 LAB
ALBUMIN SERPL BCP-MCNC: 4 G/DL (ref 3.5–4.8)
ALBUMIN/GLOB SERPL: 1.3 {RATIO} (ref 1–2)
ALP SERPL-CCNC: 106 U/L (ref 32–100)
ALT SERPL-CCNC: 15 U/L (ref 14–54)
ANION GAP SERPL CALC-SCNC: 7 MMOL/L (ref 0–18)
AST SERPL-CCNC: 24 U/L (ref 15–41)
BASOPHILS # BLD: 0.1 K/UL (ref 0–0.2)
BASOPHILS NFR BLD: 1 %
BILIRUB SERPL-MCNC: 0.5 MG/DL (ref 0.3–1.2)
BUN SERPL-MCNC: 11 MG/DL (ref 8–20)
BUN/CREAT SERPL: 16.9 (ref 10–20)
CALCIUM SERPL-MCNC: 9.5 MG/DL (ref 8.5–10.5)
CHLORIDE SERPL-SCNC: 108 MMOL/L (ref 95–110)
CO2 SERPL-SCNC: 25 MMOL/L (ref 22–32)
CREAT SERPL-MCNC: 0.65 MG/DL (ref 0.5–1.5)
CRP SERPL-MCNC: <0.5 MG/DL (ref 0–0.9)
EOSINOPHIL # BLD: 0.4 K/UL (ref 0–0.7)
EOSINOPHIL NFR BLD: 5 %
ERYTHROCYTE [DISTWIDTH] IN BLOOD BY AUTOMATED COUNT: 14.5 % (ref 11–15)
ERYTHROCYTE [SEDIMENTATION RATE] IN BLOOD: 8 MM/HR (ref 0–30)
GLOBULIN PLAS-MCNC: 3.1 G/DL (ref 2.5–3.7)
GLUCOSE SERPL-MCNC: 85 MG/DL (ref 70–99)
HCT VFR BLD AUTO: 38.5 % (ref 35–48)
HGB BLD-MCNC: 12.5 G/DL (ref 12–16)
LYMPHOCYTES # BLD: 1.8 K/UL (ref 1–4)
LYMPHOCYTES NFR BLD: 22 %
MCH RBC QN AUTO: 29 PG (ref 27–32)
MCHC RBC AUTO-ENTMCNC: 32.6 G/DL (ref 32–37)
MCV RBC AUTO: 88.9 FL (ref 80–100)
MONOCYTES # BLD: 0.5 K/UL (ref 0–1)
MONOCYTES NFR BLD: 7 %
NEUTROPHILS # BLD AUTO: 5.2 K/UL (ref 1.8–7.7)
NEUTROPHILS NFR BLD: 66 %
OSMOLALITY UR CALC.SUM OF ELEC: 289 MOSM/KG (ref 275–295)
PATIENT FASTING: NO
PLATELET # BLD AUTO: 276 K/UL (ref 140–400)
PMV BLD AUTO: 9.6 FL (ref 7.4–10.3)
POTASSIUM SERPL-SCNC: 4.1 MMOL/L (ref 3.3–5.1)
PROT SERPL-MCNC: 7.1 G/DL (ref 5.9–8.4)
RBC # BLD AUTO: 4.33 M/UL (ref 3.7–5.4)
SODIUM SERPL-SCNC: 140 MMOL/L (ref 136–144)
WBC # BLD AUTO: 8 K/UL (ref 4–11)

## 2018-09-24 PROCEDURE — 85025 COMPLETE CBC W/AUTO DIFF WBC: CPT

## 2018-09-24 PROCEDURE — 86140 C-REACTIVE PROTEIN: CPT

## 2018-09-24 PROCEDURE — 80053 COMPREHEN METABOLIC PANEL: CPT

## 2018-09-24 PROCEDURE — 36415 COLL VENOUS BLD VENIPUNCTURE: CPT

## 2018-09-24 PROCEDURE — 85652 RBC SED RATE AUTOMATED: CPT

## 2018-09-24 PROCEDURE — 97110 THERAPEUTIC EXERCISES: CPT | Performed by: PHYSICAL THERAPIST

## 2018-09-24 NOTE — PROGRESS NOTES
Diagnosis: Presence of left artificial hip joint (F05.835)    Date of surgery: 8/7/2018        Next MD visit: 9/10/18  Fall Risk: standard         Precautions: n/a          Medication Changes since last visit?: No    Subjective: Patient complains of a veronique

## 2018-09-26 ENCOUNTER — OFFICE VISIT (OUTPATIENT)
Dept: PHYSICAL THERAPY | Age: 58
End: 2018-09-26
Attending: ORTHOPAEDIC SURGERY
Payer: COMMERCIAL

## 2018-09-26 PROCEDURE — 97110 THERAPEUTIC EXERCISES: CPT | Performed by: PHYSICAL THERAPIST

## 2018-09-26 NOTE — PROGRESS NOTES
Diagnosis: Presence of left artificial hip joint (S12.572)    Date of surgery: 8/7/2018        Next MD visit: 10/15/2018  Fall Risk: standard         Precautions: n/a          Medication Changes since last visit?: No    Subjective: Patient reports L hip st

## 2018-10-01 ENCOUNTER — OFFICE VISIT (OUTPATIENT)
Dept: PHYSICAL THERAPY | Age: 58
End: 2018-10-01
Attending: ORTHOPAEDIC SURGERY
Payer: COMMERCIAL

## 2018-10-01 PROCEDURE — 97110 THERAPEUTIC EXERCISES: CPT | Performed by: PHYSICAL THERAPIST

## 2018-10-01 NOTE — PROGRESS NOTES
Diagnosis: Presence of left artificial hip joint (P59.203)    Date of surgery: 8/7/2018        Next MD visit: 10/15/2018  Fall Risk: standard         Precautions: n/a          Medication Changes since last visit?: No    Subjective: Complains of L hip stiff

## 2018-10-02 NOTE — DISCHARGE SUMMARY
[facility] HOSPITALIST  DISCHARGE SUMMARY     Melonie Mirza Patient Status:  Inpatient    6/15/1960 MRN [id number]   Location [facility] 4W/SW/SE Attending No att. providers found   Hosp Day # 6 PCP Marlena Pillai DO     Date of Admission:  again today ? equalizing  -CT thigh ordered per ortho  -recheck Hgb later today, reassuring     HTN  -off losartan b/c low BP  -monitor     DVT proph: eliquis, home on asa     Dispo: home      Discharge Medication List:     Discharge Medications      START Tabs  Commonly known as:  PERCOCET        rivaroxaban 10 MG Tabs  Commonly known as:  Charla Less              Where to Get Your Medications      Please  your prescriptions at the location directed by your doctor or nurse    Bring a paper prescription f

## 2018-10-04 ENCOUNTER — OFFICE VISIT (OUTPATIENT)
Dept: PHYSICAL THERAPY | Age: 58
End: 2018-10-04
Attending: ORTHOPAEDIC SURGERY
Payer: COMMERCIAL

## 2018-10-04 PROCEDURE — 97110 THERAPEUTIC EXERCISES: CPT | Performed by: PHYSICAL THERAPIST

## 2018-10-04 NOTE — PROGRESS NOTES
Diagnosis: Presence of left artificial hip joint (M18.539)    Date of surgery: 8/7/2018        Next MD visit: 10/15/2018  Fall Risk: standard         Precautions: n/a          Medication Changes since last visit?: No    Subjective: States her back is still

## 2018-10-08 ENCOUNTER — OFFICE VISIT (OUTPATIENT)
Dept: PHYSICAL THERAPY | Age: 58
End: 2018-10-08
Attending: ORTHOPAEDIC SURGERY
Payer: COMMERCIAL

## 2018-10-08 PROCEDURE — 97110 THERAPEUTIC EXERCISES: CPT

## 2018-10-08 NOTE — PROGRESS NOTES
Diagnosis: Presence of left artificial hip joint (R27.512)    Date of surgery: 8/7/2018        Next MD visit: 10/15/2018  Fall Risk: standard         Precautions: n/a          Medication Changes since last visit?: No    Subjective: Patient reports stiffnes

## 2018-10-10 ENCOUNTER — OFFICE VISIT (OUTPATIENT)
Dept: PHYSICAL THERAPY | Age: 58
End: 2018-10-10
Attending: ORTHOPAEDIC SURGERY
Payer: COMMERCIAL

## 2018-10-10 PROCEDURE — 97110 THERAPEUTIC EXERCISES: CPT

## 2018-10-10 NOTE — PROGRESS NOTES
Diagnosis: Presence of left artificial hip joint (A21.829)    Date of surgery: 8/7/2018        Next MD visit: 10/15/2018  Fall Risk: standard         Precautions: n/a          Medication Changes since last visit?: No    Subjective: Patient reports stiffnes

## 2018-10-17 ENCOUNTER — OFFICE VISIT (OUTPATIENT)
Dept: PHYSICAL THERAPY | Age: 58
End: 2018-10-17
Attending: ORTHOPAEDIC SURGERY
Payer: COMMERCIAL

## 2018-10-17 ENCOUNTER — TELEPHONE (OUTPATIENT)
Dept: PHYSICAL THERAPY | Age: 58
End: 2018-10-17

## 2018-10-17 PROCEDURE — 97110 THERAPEUTIC EXERCISES: CPT | Performed by: PHYSICAL THERAPIST

## 2018-10-17 NOTE — PROGRESS NOTES
Diagnosis: Presence of left artificial hip joint (L12.729)    Date of surgery: 8/7/2018        Next MD visit: 10/15/2018  Fall Risk: standard         Precautions: n/a          Medication Changes since last visit?: No    Subjective: Patient states she is wa PT.    Charges: EX 3       Total Timed Treatment: 45 min  Total Treatment Time: 45 min

## 2018-10-22 ENCOUNTER — TELEPHONE (OUTPATIENT)
Dept: PHYSICAL THERAPY | Age: 58
End: 2018-10-22

## 2018-10-24 ENCOUNTER — OFFICE VISIT (OUTPATIENT)
Dept: PHYSICAL THERAPY | Age: 58
End: 2018-10-24
Attending: ORTHOPAEDIC SURGERY
Payer: COMMERCIAL

## 2018-10-24 PROCEDURE — 97110 THERAPEUTIC EXERCISES: CPT | Performed by: PHYSICAL THERAPIST

## 2018-10-24 NOTE — PROGRESS NOTES
Diagnosis: Presence of left artificial hip joint (P59.044)    Date of surgery: 8/7/2018        Next MD visit: 10/15/2018  Fall Risk: standard         Precautions: n/a          Medication Changes since last visit?: No    Subjective: Patient states she is wa sets 5\" holds 2 x 10  - supine bridging to neutral 10 x  2 sets for 5 sec holds  - sidelying L hip abduction 3 x 10 AROM  - seated posture correction (extension) with lumbar roll 5\" holds x 20  - seated L LAQ 2 x 10 with 2.5# for 5\" holds   - seated L h

## 2018-10-29 ENCOUNTER — OFFICE VISIT (OUTPATIENT)
Dept: PHYSICAL THERAPY | Age: 58
End: 2018-10-29
Attending: FAMILY MEDICINE
Payer: COMMERCIAL

## 2018-10-29 PROCEDURE — 97110 THERAPEUTIC EXERCISES: CPT | Performed by: PHYSICAL THERAPIST

## 2018-10-29 NOTE — PROGRESS NOTES
Diagnosis: Presence of left artificial hip joint (Q73.034)    Date of surgery: 8/7/2018        Next MD visit: 10/15/2018  Fall Risk: standard         Precautions: n/a          Medication Changes since last visit?: No    Subjective: Patient states her hip f heel/toe)  supine  - supine L QS/glute sets 5\" holds 2 x 10  - supine L heel slides 2 x 10 AROM  - supine L hip and knee flexion PROM 10 x 2  - supine bridging to neutral 10 x  2 sets for 5 sec holds  - sidelying L hip abduction 3 x 10 AROM  - seated L LA

## 2018-10-31 ENCOUNTER — OFFICE VISIT (OUTPATIENT)
Dept: PHYSICAL THERAPY | Age: 58
End: 2018-10-31
Attending: FAMILY MEDICINE
Payer: COMMERCIAL

## 2018-10-31 PROCEDURE — 97110 THERAPEUTIC EXERCISES: CPT | Performed by: PHYSICAL THERAPIST

## 2018-10-31 NOTE — PROGRESS NOTES
Diagnosis: Presence of left artificial hip joint (K79.605)    Date of surgery: 8/7/2018        Next MD visit: 10/15/2018  Fall Risk: standard         Precautions: n/a          Medication Changes since last visit?: No    Subjective: Patient complains of L h step 2 x 10 with B U/E support  - Shuttle L LE extension 3 - 4 bands 10 x 3  - Nustep L5 x 10min - TM 0.7-1.2 MPH x 7 minutes (cueing for full strides heel/toe)  Supine  - calf stretch on stairs 10 x 2  - R/L hip AROM in standing 10 x  - stair climbing 4 s ball 10\" holds x 20 reps   - CKC L fwd step up 4\" step 2 x 10 with B U/E  - standing R/L hip abduction/extension 2 x 10 each                              Assessment: Continued with previous therapeutic exercises. Patient needs less cueing when walking.  P

## 2018-11-05 ENCOUNTER — OFFICE VISIT (OUTPATIENT)
Dept: PHYSICAL THERAPY | Age: 58
End: 2018-11-05
Attending: FAMILY MEDICINE
Payer: COMMERCIAL

## 2018-11-05 PROCEDURE — 97110 THERAPEUTIC EXERCISES: CPT | Performed by: PHYSICAL THERAPIST

## 2018-11-05 NOTE — PROGRESS NOTES
Diagnosis: Presence of left artificial hip joint (S30.988)    Date of surgery: 8/7/2018        Next MD visit: 10/15/2018  Fall Risk: standard         Precautions: n/a          Medication Changes since last visit?: No    Subjective: Patient states she did a step stool to tolerance 1 x 10x   - CKC L fwd step up 6\" step 2 x 10 with B U/E support  - CKC L lateral step up on 6\" step 2 x 10 with B U/E support  - Shuttle L LE extension 3 - 4 bands 10 x 3  - Nustep L5 x 10min   - TM 1.5-1. 7MPH x 7 minutes (cueing with GTB 2 x 10   - seated trunk flexion to tolerance 1 x 10x   - L LE standing with R hip and knee flexion AROM  - CKC L fwd step up 6\" step 2 x 10 with B U/E  - standing R/L hip abduction/extension 2 x 10 each   - stairclimbing x 4 step 3-4 times   - TM

## 2018-11-07 ENCOUNTER — OFFICE VISIT (OUTPATIENT)
Dept: PHYSICAL THERAPY | Age: 58
End: 2018-11-07
Attending: FAMILY MEDICINE
Payer: COMMERCIAL

## 2018-11-07 PROCEDURE — 97110 THERAPEUTIC EXERCISES: CPT | Performed by: PHYSICAL THERAPIST

## 2018-11-07 NOTE — PROGRESS NOTES
Diagnosis: Presence of left artificial hip joint (T45.472)    Date of surgery: 8/7/2018        Next MD visit: 10/15/2018  Fall Risk: standard         Precautions: n/a          Medication Changes since last visit?: No    Subjective: Patient still complains with L foot up on step stool to tolerance 1 x 10x   - stairclimbing x 4 steps x 5 rounds  - CKC L fwd step up 8\" step 2 x 10 with B U/E support  - Shuttle L LE extension 5 bands 10 x 3  - Nustep L5 x 10min   - TM 1.5-1. 7MPH x 7 minutes (cueing for full st 2  - supine L heel slides 2 x 10 AROM  - supine L knee to chest 10x  - supine single L LE bridging to neutral 10 x  2 sets for 5 sec holds  - sidelying L hip abduction 3 x 10 AROM  - seated L hip flexion 10 x 2  - seated trunk flexion to tolerance 1 x 10x being able to tie her shoe. Reviewed post-op tightness and how sometimes it may continue to limit functional mobility which will make tying her shoe difficult. Goals     • Therapy Goals      1. Patient will be independent with HEP and its progression.

## 2018-11-12 ENCOUNTER — OFFICE VISIT (OUTPATIENT)
Dept: PHYSICAL THERAPY | Age: 58
End: 2018-11-12
Attending: FAMILY MEDICINE
Payer: COMMERCIAL

## 2018-11-12 PROCEDURE — 97110 THERAPEUTIC EXERCISES: CPT | Performed by: PHYSICAL THERAPIST

## 2018-11-12 NOTE — PROGRESS NOTES
Diagnosis: Presence of left artificial hip joint (F74.645)    Date of surgery: 8/7/2018        Next MD visit: 10/15/2018  Fall Risk: standard         Precautions: n/a          Medication Changes since last visit?: No    Subjective: Patient states she is fr SLR 10 x 2  - supine L LE abduction 10 x 2  - supine single L LE bridging to neutral 10 x  2 sets for 5 sec holds  - sidelying L hip abduction 3 x 10 AROM  - prone trunk extensions 5 x 2  - prone L knee flexion 10 x 2  - seated trunk flexion with L foot up full strides heel/toe)  Supine  - calf stretch on stairs 10 x 2  - L hip flexion stretch on stairs 10 x 2  - R/L hip AROM in standing 10 x  - stair climbing 4 steps 5x  - supine L QS sets 5\" holds 10x  - supine L hip and knee flexion PROM 10 x 2  - supine 0. 7-1. 2 MPH x 7 minutes (cueing for full strides heel/toe)  - reassess strength and ROM x 4 minutes   - hooklying anterior/posterior pelvic tilt x 30 each   - Laurie curl up R/L 2 x 10 each   - supine L heel slides 2 x 10 AROM  - supine L QS/glute sets 5\"

## 2018-11-13 ENCOUNTER — TELEPHONE (OUTPATIENT)
Dept: NEUROLOGY | Facility: CLINIC | Age: 58
End: 2018-11-13

## 2018-11-13 ENCOUNTER — OFFICE VISIT (OUTPATIENT)
Dept: FAMILY MEDICINE CLINIC | Facility: CLINIC | Age: 58
End: 2018-11-13

## 2018-11-13 VITALS
RESPIRATION RATE: 14 BRPM | TEMPERATURE: 98 F | HEART RATE: 81 BPM | WEIGHT: 198 LBS | BODY MASS INDEX: 31.08 KG/M2 | DIASTOLIC BLOOD PRESSURE: 90 MMHG | HEIGHT: 67 IN | SYSTOLIC BLOOD PRESSURE: 130 MMHG

## 2018-11-13 DIAGNOSIS — T84.018A PROSTHETIC HIP IMPLANT FAILURE, INITIAL ENCOUNTER (HCC): ICD-10-CM

## 2018-11-13 DIAGNOSIS — F32.A DEPRESSIVE DISORDER: ICD-10-CM

## 2018-11-13 DIAGNOSIS — M48.061 FORAMINAL STENOSIS OF LUMBAR REGION: ICD-10-CM

## 2018-11-13 DIAGNOSIS — F41.9 ANXIETY: ICD-10-CM

## 2018-11-13 DIAGNOSIS — Z96.642 S/P HIP REPLACEMENT, LEFT: ICD-10-CM

## 2018-11-13 DIAGNOSIS — M79.651 BILATERAL THIGH PAIN: ICD-10-CM

## 2018-11-13 DIAGNOSIS — M16.12 PRIMARY OSTEOARTHRITIS OF LEFT HIP: Primary | ICD-10-CM

## 2018-11-13 DIAGNOSIS — M51.26 HERNIATION OF NUCLEUS PULPOSUS, LUMBAR: ICD-10-CM

## 2018-11-13 DIAGNOSIS — Z23 NEED FOR VACCINATION: ICD-10-CM

## 2018-11-13 DIAGNOSIS — M79.652 BILATERAL THIGH PAIN: ICD-10-CM

## 2018-11-13 DIAGNOSIS — Z96.649 PROSTHETIC HIP IMPLANT FAILURE, INITIAL ENCOUNTER (HCC): ICD-10-CM

## 2018-11-13 PROCEDURE — 99215 OFFICE O/P EST HI 40 MIN: CPT | Performed by: FAMILY MEDICINE

## 2018-11-13 PROCEDURE — 90471 IMMUNIZATION ADMIN: CPT | Performed by: FAMILY MEDICINE

## 2018-11-13 PROCEDURE — 99212 OFFICE O/P EST SF 10 MIN: CPT | Performed by: FAMILY MEDICINE

## 2018-11-13 PROCEDURE — 90686 IIV4 VACC NO PRSV 0.5 ML IM: CPT | Performed by: FAMILY MEDICINE

## 2018-11-13 RX ORDER — DULOXETIN HYDROCHLORIDE 30 MG/1
CAPSULE, DELAYED RELEASE ORAL
Qty: 60 CAPSULE | Refills: 1 | Status: SHIPPED | OUTPATIENT
Start: 2018-11-13 | End: 2018-12-22

## 2018-11-13 NOTE — PROGRESS NOTES
Patient ID: Sakshi Chaudhary is a 66year old female. HPI  Patient presents with:  Hypertension      She thinks that the blood pressure sometimes goes up due to stress. She is not on blood pressure medications. She is here for us to recheck at all. Roxana Stein MD at [address] [place] [address] MAIN OR   • HIP TOTAL ANTERIOR APPROACH Left 6/19/2018    Performed by Deepali Edward MD at [address] [place] [address] MAIN OR   • HIP TOTAL ARTHROPLASTY REVISION Left 8/7/2018    Performed by Mily Ohara MD at [facility]   • OTHER SURGICAL HISTORY      [facility] breath sounds normal. No respiratory distress. Neurological: Patient is alert and oriented to person, place, and time. Skin: Skin is warm and dry. Psychiatric: Patient has a normal mood and affect.    LT hip:  BACK: Has NO tenderness over the lumbar p stenosis of lumbar region  -     PHYSIATRY - INTERNAL    Herniation of nucleus pulposus, lumbar  -     PHYSIATRY - INTERNAL    Bilateral thigh pain  -     PHYSIATRY - INTERNAL        Referrals (if applicable)  Orders Placed This Encounter      Physiatry -

## 2018-11-13 NOTE — TELEPHONE ENCOUNTER
Pt states that she was told by Dr. Sandy Palomo to see Dr. Fly Villegas as soon as possible for a follow up due to new symptoms she is experiencing, please advise since Fly Villegas is out, can pt see Aj Rick?

## 2018-11-14 ENCOUNTER — OFFICE VISIT (OUTPATIENT)
Dept: PHYSICAL THERAPY | Age: 58
End: 2018-11-14
Attending: FAMILY MEDICINE
Payer: COMMERCIAL

## 2018-11-14 PROCEDURE — 97110 THERAPEUTIC EXERCISES: CPT | Performed by: PHYSICAL THERAPIST

## 2018-11-14 NOTE — PROGRESS NOTES
Diagnosis: Presence of left artificial hip joint (A42.888)    Date of surgery: 8/7/2018        Next MD visit: 10/15/2018  Fall Risk: standard         Precautions: n/a          Medication Changes since last visit?: No    Subjective: Patient still complains supine R/ L knee to chest 10 x 2  - supine L LE SLR 10 x 2  - supine L LE abduction 10 x 2  - supine single L LE bridging to neutral 10 x  2 sets for 5 sec holds  - sidelying L hip abduction 3 x 10 AROM  - prone trunk extensions 5 x 2  - prone L knee flexi B U/E support  - Shuttle L LE extension 5 bands 10 x 3  - Nustep L5 x 10min   - TM 1.5-1. 7MPH x 7 minutes (cueing for full strides heel/toe)  Supine  - calf stretch on stairs 10 x 2  - L hip flexion stretch on stairs 10 x 2  - R/L hip AROM in standing 10 x sec holds  - sidelying L hip abduction 3 x 10 AROM  - seated L hip flexion 10 x 2  - seated trunk flexion to tolerance 1 x 10x   - CKC L fwd step up 6\" step 2 x 10 with B U/E  - CKC L lateral step up on 6\" step 2 x 10 with B U/E  - Nustep L5 x 10min   - its progression. 2. Increase L LE ROM to [facility] into all planes to improve mobility. 3. Increase strength to 5/5 throughout LE to improve tolerance to walking and standing.   4. Patient will resume all walking activities without a device and without gait dev

## 2018-11-19 ENCOUNTER — OFFICE VISIT (OUTPATIENT)
Dept: PHYSICAL THERAPY | Age: 58
End: 2018-11-19
Attending: FAMILY MEDICINE
Payer: COMMERCIAL

## 2018-11-19 PROCEDURE — 97110 THERAPEUTIC EXERCISES: CPT | Performed by: PHYSICAL THERAPIST

## 2018-11-19 NOTE — PROGRESS NOTES
Physical Therapy Treatment and Progress Report    Patient Name: Saritha Castrejon, MRN: [id number]  Date: 11/19/2018  Referring Physician: Dr. Andres Earl    Diagnosis: Presence of left artificial hip joint (N11.649)      Pt has attended 20 visits in Goes up and down stairs reciprocally but needs rail support.      11/19/2018  [facility]  [facility]  Visit#: [facility]  Visit#: [facility]  Visit[facility]  Visit# seated trunk flexion with L foot up on step stool to tolerance 1 x 10x   - Shuttle B LE extension 5 bands 10 x 3  - Shuttle L LE extension 4 bands 10 x 3  - CKC L fwd step up 8\" step 2 x 10 with B U/E support - TM 1.7-2. 0MPH x 7 minutes (cueing for full s 10 sec hold  - calf stretch on stairs 10 x 2  - L hip flexion stretch on stairs 10 x 2  - R/L hip AROM in standing 10 x  - supine L hip and knee flexion PROM 10 x 2  - supine R/ L knee to chest 10 x 2  - supine L LE SLR 10 x 2  - supine L LE abduction 10 x - CKC L fwd step up 6\" step 2 x 10 with B U/E support  - CKC L lateral step up on 6\" step 2 x 10 with B U/E support  - Shuttle L LE extension 3 - 4 bands 10 x 3  - Nustep L5 x 10min - TM 0.7-1.2 MPH x 7 minutes (cueing for full strides heel/toe)  [employer] 10   - seated trunk flexion to tolerance 1 x 10   - seated hip adduction squeeze of ball 10\" holds x 20 reps   - CKC L fwd step up 4\" step 2 x 10 with B U/E  - standing R/L hip abduction/extension 2 x 10 each                                        Charge

## 2018-11-20 ENCOUNTER — OFFICE VISIT (OUTPATIENT)
Dept: NEUROLOGY | Facility: CLINIC | Age: 58
End: 2018-11-20

## 2018-11-20 ENCOUNTER — HOSPITAL ENCOUNTER (OUTPATIENT)
Dept: GENERAL RADIOLOGY | Facility: HOSPITAL | Age: 58
Discharge: HOME | End: 2018-11-20
Attending: PHYSICAL MEDICINE & REHABILITATION
Payer: COMMERCIAL

## 2018-11-20 ENCOUNTER — APPOINTMENT (OUTPATIENT)
Dept: LAB | Facility: HOSPITAL | Age: 58
End: 2018-11-20
Attending: PHYSICAL MEDICINE & REHABILITATION
Payer: COMMERCIAL

## 2018-11-20 VITALS
BODY MASS INDEX: 30.45 KG/M2 | SYSTOLIC BLOOD PRESSURE: 140 MMHG | DIASTOLIC BLOOD PRESSURE: 80 MMHG | WEIGHT: 194 LBS | HEIGHT: 67 IN | HEART RATE: 76 BPM | RESPIRATION RATE: 16 BRPM

## 2018-11-20 DIAGNOSIS — Z96.642 HISTORY OF LEFT HIP REPLACEMENT: ICD-10-CM

## 2018-11-20 DIAGNOSIS — G57.10: ICD-10-CM

## 2018-11-20 DIAGNOSIS — M79.651 BILATERAL THIGH PAIN: ICD-10-CM

## 2018-11-20 DIAGNOSIS — M79.652 BILATERAL THIGH PAIN: ICD-10-CM

## 2018-11-20 DIAGNOSIS — M41.125 ADOLESCENT IDIOPATHIC SCOLIOSIS OF THORACOLUMBAR REGION: Primary | ICD-10-CM

## 2018-11-20 DIAGNOSIS — M41.125 ADOLESCENT IDIOPATHIC SCOLIOSIS OF THORACOLUMBAR REGION: ICD-10-CM

## 2018-11-20 PROCEDURE — 99214 OFFICE O/P EST MOD 30 MIN: CPT | Performed by: PHYSICAL MEDICINE & REHABILITATION

## 2018-11-20 PROCEDURE — 86140 C-REACTIVE PROTEIN: CPT | Performed by: PHYSICAL MEDICINE & REHABILITATION

## 2018-11-20 PROCEDURE — 85652 RBC SED RATE AUTOMATED: CPT

## 2018-11-20 PROCEDURE — 72082 X-RAY EXAM ENTIRE SPI 2/3 VW: CPT | Performed by: PHYSICAL MEDICINE & REHABILITATION

## 2018-11-20 PROCEDURE — 36415 COLL VENOUS BLD VENIPUNCTURE: CPT

## 2018-11-20 RX ORDER — CEFADROXIL 500 MG/1
CAPSULE ORAL
Refills: 2 | COMMUNITY
Start: 2018-10-29 | End: 2019-04-15

## 2018-11-20 NOTE — PROGRESS NOTES
[address]  Progress Note    CHIEF COMPLAINT:  Patient presents with:  Musculoskeletal Problem: 's LOV 2/1/2018. Patient with hx of left hip pain.  Patient states she had Left THR on 6/19/2018 which hypertension        SURGICAL HISTORY:  Past Surgical History:   Procedure Laterality Date   • ELECTROCARDIOGRAM, COMPLETE  06-    Scanned to Media Tab - Date of Service 06-   • EXTREMITY LOWER IRRIGATION & DEBRIDEMENT Left 7/3/2018    Perform Negative. Musculoskeletal: Bilateral thigh pain  Skin: Negative. Neurological: Negative  Endo/Heme/Allergies: Negative. Psychiatric/Behavioral: Negative. All other systems reviewed and are negative.  Pertinent positives and negatives noted in pain symptoms    LEFT HIP:  Inspection: Large well-healed anterior scar status post left TRAVIS  Palpation: no ttp over greater trochanter or down the ITB, ASIS, psoas muscle,  ischial tuberosity, or piriformis.   Tenderness with palpable trigger points noted 09/24/2018 >60  >=60 Final   • FASTING 09/24/2018 No   Final   • WBC 09/24/2018 8.0  4.0 - 11.0 K/UL Final   • RBC 09/24/2018 4.33  3.70 - 5.40 M/UL Final   • HGB 09/24/2018 12.5  12.0 - 16.0 g/dL Final   • HCT 09/24/2018 38.5  35.0 - 48.0 % Final   • MCV BUN/CREA Ratio 08/27/2018 12.5  10.0 - 20.0 Final   • Calculated Osmolality 08/27/2018 288  275 - 295 mOsm/kg Final   • GFR, Non- 08/27/2018 >60  >=60 Final   • GFR, -American 08/27/2018 >60  >=60 Final   • C-Reactive Protein 08/27/2 1.2 mg/dL Final   • Total Protein 08/20/2018 6.4  5.9 - 8.4 g/dL Final   • Albumin 08/20/2018 3.4* 3.5 - 4.8 g/dL Final   • Globulin 08/20/2018 3.0  2.5 - 3.7 g/dL Final   • A/G Ratio 08/20/2018 1.1  1.0 - 2.0 Final   • Anion Gap 08/20/2018 9  0 - 18 mmol/ 0.50 - 1.50 mg/dL Final   • Calcium, Total 08/14/2018 8.9  8.5 - 10.5 mg/dL Final   • ALT 08/14/2018 10* 14 - 54 U/L Final   • AST 08/14/2018 22  15 - 41 U/L Final   • Alkaline Phosphatase 08/14/2018 94  32 - 100 U/L Final   • Bilirubin, Total 08/14/2018 0 Status   • Glucose 08/04/2018 105* 70 - 99 mg/dL Final   • Sodium 08/04/2018 139  136 - 144 mmol/L Final   • Potassium 08/04/2018 3.2* 3.3 - 5.1 mmol/L Final   • Chloride 08/04/2018 106  95 - 110 mmol/L Final   • CO2 08/04/2018 25  22 - 32 mmol/L Final   • 08/05/2018 0.80  0.50 - 1.50 mg/dL Final   • Calcium, Total 08/05/2018 9.3  8.5 - 10.5 mg/dL Final   • BUN/CREA Ratio 08/05/2018 16.3  10.0 - 20.0 Final   • Anion Gap 08/05/2018 8  0 - 18 mmol/L Final   • Calculated Osmolality 08/05/2018 292  275 - 295 mOs Authorizing Provider:  Callie Kirkpatrick MD            Collected:           08/07/2018 05:17 PM          Ordering Location:     [facility]          Received:            08/07/2018 05:25 PM                                 Operating Room Final   • RBC 08/08/2018 2.59* 3.70 - 5.40 M/UL Final   • HGB 08/08/2018 7.8* 12.0 - 16.0 g/dL Final   • HCT 08/08/2018 24.6* 35.0 - 48.0 % Final   • MCV 08/08/2018 94.6  80.0 - 100.0 fL Final   • MCH 08/08/2018 30.2  27.0 - 32.0 pg Final   • MCHC 08/08/20 Final   • Potassium 08/10/2018 3.8  3.3 - 5.1 mmol/L Final   • HGB 08/10/2018 7.7* 12.0 - 16.0 g/dL Final   Lab Requisition on 07/30/2018   Component Date Value Ref Range Status   • Glucose 07/30/2018 97  70 - 99 mg/dL Final   • Sodium 07/30/2018 137  136 Eosinophil % 07/30/2018 6  % Final   • Basophil % 07/30/2018 1  % Final   • Neutrophil Absolute 07/30/2018 4.6  1.8 - 7.7 K/UL Final   • Lymphocyte Absolute 07/30/2018 2.0  1.0 - 4.0 K/UL Final   • Monocyte Absolute 07/30/2018 0.7  0.0 - 1.0 K/UL Final   • MPV 07/23/2018 9.6  7.4 - 10.3 fL Final   • Neutrophil % 07/23/2018 71  % Final   • Lymphocyte % 07/23/2018 19  % Final   • Monocyte % 07/23/2018 5  % Final   • Eosinophil % 07/23/2018 4  % Final   • Basophil % 07/23/2018 1  % Final   • Neutrophil Absolute 32.0 pg Final   • MCHC 07/16/2018 32.2  32.0 - 37.0 g/dl Final   • RDW 07/16/2018 14.7  11.0 - 15.0 % Final   • PLT 07/16/2018 298  140 - 400 K/UL Final   • MPV 07/16/2018 8.6  7.4 - 10.3 fL Final   • Neutrophil % 07/16/2018 64  % Final   • Lymphocyte % 07 cannot be displayed here. • Gross Description 07/03/2018    Final                    Value: This result contains rich text formatting which cannot be displayed here.    • Interpretation 07/03/2018 Abnormal*   Final   • WBC 07/04/2018 15.9* 4.0 - 11.0 K/UL 2.26* 3.70 - 5.40 M/UL Final   • HGB 07/06/2018 7.2* 12.0 - 16.0 g/dL Final   • HCT 07/06/2018 21.1* 35.0 - 48.0 % Final   • MCV 07/06/2018 93.3  80.0 - 100.0 fL Final   • MCH 07/06/2018 31.8  27.0 - 32.0 pg Final   • MCHC 07/06/2018 34.1  32.0 - 37.0 g/dl Specimen:    Hip, left, 1) LEFT HIP BONE AND TISSUE                                                    • Final Diagnosis: 06/19/2018    Final                    Value: This result contains rich text fo % Final   • MCV 06/21/2018 95.9  80.0 - 100.0 fL Final   • MCH 06/21/2018 32.2* 27.0 - 32.0 pg Final   • MCHC 06/21/2018 33.6  32.0 - 37.0 g/dl Final   • RDW 06/21/2018 13.0  11.0 - 15.0 % Final   • PLT 06/21/2018 196  140 - 400 K/UL Final   • MPV 06/21/20 reconstruction technique for dose reduction was used. Given known history of arthroplasty, single energy metallic artifact reduction (SEMAR) volumetric acquisition as well as standard helical acquisition were performed.       FINDINGS:          BONES: surgery. Within the proximal end of the vastus lateralis muscle belly, there is a   gas and fluid collection measuring 55 x 30 x 89 mm.   This could represent postprocedural inflammatory change however differential includes a liquified hematoma, seroma, or patient of Dr. Fred Rivas is coming in complaining of bilateral thigh pain status post left total hip arthroplasty revision performed by Dr. Erika Torres on 8/10/2018.   She is here complaining of bilateral thigh pain which may be caused by lateral femoral cutaneous

## 2018-11-20 NOTE — PATIENT INSTRUCTIONS
1. Scoliosis view X-rays  2.. EMG/NCS of BILATERAL LE with femoral nerve and LFCN evaluation  3. ESR/CRP lab draws  4. Possible MRI depending on EMG and XR results  5.  Follow up in 6 weeks or sooner for EMG

## 2018-11-21 ENCOUNTER — OFFICE VISIT (OUTPATIENT)
Dept: PODIATRY CLINIC | Facility: CLINIC | Age: 58
End: 2018-11-21

## 2018-11-21 DIAGNOSIS — M79.675 PAIN IN TOES OF BOTH FEET: Primary | ICD-10-CM

## 2018-11-21 DIAGNOSIS — B35.1 ONYCHOMYCOSIS: ICD-10-CM

## 2018-11-21 DIAGNOSIS — M79.674 PAIN IN TOES OF BOTH FEET: Primary | ICD-10-CM

## 2018-11-21 PROCEDURE — 11721 DEBRIDE NAIL 6 OR MORE: CPT | Performed by: PODIATRIST

## 2018-11-21 NOTE — PROGRESS NOTES
HPI:    Patient ID: Marley Gaffney is a 66year old female. This 51-year-old female presents with recurrent pain associated with her nails. The last time I saw her she states that debridement mattered.   She still struggling in reference to hip surgery

## 2018-11-23 ENCOUNTER — OFFICE VISIT (OUTPATIENT)
Dept: PHYSICAL THERAPY | Age: 58
End: 2018-11-23
Attending: FAMILY MEDICINE
Payer: COMMERCIAL

## 2018-11-23 PROCEDURE — 97110 THERAPEUTIC EXERCISES: CPT | Performed by: PHYSICAL THERAPIST

## 2018-11-23 NOTE — PROGRESS NOTES
Diagnosis: Presence of left artificial hip joint (L46.371)    Date of surgery: 8/7/2018        Next MD visit: 10/15/2018  Fall Risk: standard         Precautions: n/a          Medication Changes since last visit?: No    Subjective: Patient states her hip flexion, hip abduction PROM   - supine R/ L knee to chest 10 x 2  - supine L LE SLR 10 x 2  - supine L LE abduction 10 x 2  - supine single L LE bridging to neutral 10 x  2 sets for 5 sec holds  - sidelying L hip abduction 3 x 10 AROM  - prone trunk extens 2  - prone L knee flexion 10 x 2  - seated trunk flexion with L foot up on step stool to tolerance 1 x 10x   - Shuttle B LE extension 5 bands 10 x 3  - Shuttle L LE extension 4 bands 10 x 3  - CKC L fwd step up 8\" step 2 x 10 with B U/E support   - TM 1.5 hip AROM in standing 10 x  - stair climbing 4 steps 5x  - supine L QS sets 5\" holds 10x  - supine L hip and knee flexion PROM 10 x 2  - supine L LE SLR 10 x 2  - supine L knee to chest with belt 10x  - supine single L LE bridging to neutral 10 x  2 sets f Nustep L5 x 10min   - TM 0.7-1.2 MPH x 7 minutes (cueing for full strides heel/toe)  supine  - supine L QS/glute sets 5\" holds 2 x 10  - supine L heel slides 2 x 10 AROM  - supine L hip and knee flexion PROM 10 x 2  - supine bridging to neutral 10 x  2 se tolerance to walking and standing. 4. Patient will resume all walking activities without a device and without gait deviations. Plan: Continue PT.     Charges: EX3       Total Timed Treatment: 45 min  Total Treatment Time: 50 min

## 2018-11-27 ENCOUNTER — OFFICE VISIT (OUTPATIENT)
Dept: PHYSICAL THERAPY | Age: 58
End: 2018-11-27
Attending: ORTHOPAEDIC SURGERY
Payer: COMMERCIAL

## 2018-11-27 PROCEDURE — 97110 THERAPEUTIC EXERCISES: CPT | Performed by: PHYSICAL THERAPIST

## 2018-11-27 NOTE — PROGRESS NOTES
Diagnosis: Presence of left artificial hip joint (I48.083)    Date of surgery: 8/7/2018        Next MD visit: 10/15/2018  Fall Risk: standard         Precautions: n/a          Medication Changes since last visit?: No    Subjective: Patient states her hip L LE abduction 10 x 2  - supine single L LE bridging to neutral 10 x  2 sets for 5 sec holds  - sidelying L hip abduction 3 x 10 AROM  - prone trunk extensions 5 x 2  - prone L knee flexion 10 x 2  - prone LE extensions 10 x 2  - seated trunk flexion with seated trunk flexion with L foot up on step stool to tolerance 1 x 10x   - Shuttle B LE extension 5 bands 10 x 3  - Shuttle L LE extension 4 bands 10 x 3  - CKC L fwd step up 8\" step 2 x 10 with B U/E support - TM 1.7-2. 0MPH x 7 minutes (cueing for full s 10 sec hold  - calf stretch on stairs 10 x 2  - L hip flexion stretch on stairs 10 x 2  - R/L hip AROM in standing 10 x  - supine L hip and knee flexion PROM 10 x 2  - supine R/ L knee to chest 10 x 2  - supine L LE SLR 10 x 2  - supine L LE abduction 10 x - CKC L fwd step up 6\" step 2 x 10 with B U/E support  - CKC L lateral step up on 6\" step 2 x 10 with B U/E support  - Shuttle L LE extension 3 - 4 bands 10 x 3  - Nustep L5 x 10min - TM 0.7-1.2 MPH x 7 minutes (cueing for full strides heel/toe)  [employer] 10   - seated trunk flexion to tolerance 1 x 10   - seated hip adduction squeeze of ball 10\" holds x 20 reps   - CKC L fwd step up 4\" step 2 x 10 with B U/E  - standing R/L hip abduction/extension 2 x 10 each                                            Ch

## 2018-11-28 ENCOUNTER — TELEPHONE (OUTPATIENT)
Dept: FAMILY MEDICINE CLINIC | Facility: CLINIC | Age: 58
End: 2018-11-28

## 2018-11-28 DIAGNOSIS — M16.12 PRIMARY OSTEOARTHRITIS OF LEFT HIP: ICD-10-CM

## 2018-11-28 DIAGNOSIS — T84.018A PROSTHETIC HIP IMPLANT FAILURE, INITIAL ENCOUNTER (HCC): Primary | ICD-10-CM

## 2018-11-28 DIAGNOSIS — Z96.649 PROSTHETIC HIP IMPLANT FAILURE, INITIAL ENCOUNTER (HCC): Primary | ICD-10-CM

## 2018-11-29 ENCOUNTER — OFFICE VISIT (OUTPATIENT)
Dept: PHYSICAL THERAPY | Age: 58
End: 2018-11-29
Attending: ORTHOPAEDIC SURGERY
Payer: COMMERCIAL

## 2018-11-29 PROCEDURE — 97110 THERAPEUTIC EXERCISES: CPT | Performed by: PHYSICAL THERAPIST

## 2018-11-29 PROCEDURE — 97116 GAIT TRAINING THERAPY: CPT | Performed by: PHYSICAL THERAPIST

## 2018-11-29 NOTE — PROGRESS NOTES
Diagnosis: Presence of left artificial hip joint (K27.500)    Date of surgery: 8/7/2018        Next MD visit: 10/15/2018  Fall Risk: standard         Precautions: n/a          Medication Changes since last visit?: No    Subjective: Complains of L hip sor single L LE bridging to neutral 10 x  2 sets for 5 sec holds  - sidelying L hip abduction 3 x 10 AROM  - prone trunk extensions 5 x 2  - prone L knee flexion 10 x 2  - prone LE extensions 10 x 2  - Shuttle B LE extension 6 bands 10 x 3  - Shuttle L LE exte extension 4 bands 10 x 3  - CKC L fwd step up 8\" step 2 x 10 with B U/E support - TM 1.7-2. 0MPH x 7 minutes (cueing for full strides heel/toe)  - single leg stance on L LE 10 x 10 sec hold  - calf stretch on stairs 10 x 2  - L hip flexion stretch on stair R/L hip AROM in standing 10 x  - standing L knee flexion 10 x   - stairclimbing x 4 steps x 5 rounds  - supine L hip and knee flexion, hip abduction PROM   - supine R/ L knee to chest 10 x 2  - supine L LE SLR 10 x 2  - supine L LE abduction 10 x 2  - supi up 6\" step 2 x 10 with B U/E support  - CKC L lateral step up on 6\" step 2 x 10 with B U/E support  - Shuttle L LE extension 3 - 4 bands 10 x 3  - Nustep L5 x 10min   - TM 1.5-1. 7MPH x 7 minutes (cueing for full strides heel/toe)  Supine  - calf stretch tolerance 1 x 10x   - L LE standing with R hip and knee flexion AROM  - CKC L fwd step up 6\" step 2 x 10 with B U/E  - standing R/L hip abduction/extension 2 x 10 each   - stairclimbing x 4 step 3-4 times   - TM 0.7-1.2 MPH x 7 minutes (cueing for full st

## 2018-12-04 ENCOUNTER — OFFICE VISIT (OUTPATIENT)
Dept: PHYSICAL THERAPY | Age: 58
End: 2018-12-04
Attending: ORTHOPAEDIC SURGERY
Payer: COMMERCIAL

## 2018-12-04 PROCEDURE — 97110 THERAPEUTIC EXERCISES: CPT | Performed by: PHYSICAL THERAPIST

## 2018-12-04 PROCEDURE — 97530 THERAPEUTIC ACTIVITIES: CPT | Performed by: PHYSICAL THERAPIST

## 2018-12-04 NOTE — PROGRESS NOTES
Diagnosis: Presence of left artificial hip joint (O25.722)    Date of surgery: 8/7/2018        Next MD visit: 10/15/2018  Fall Risk: standard         Precautions: n/a          Medication Changes since last visit?: No    Subjective: States she is walking abduction 10 x 2  - supine single L LE bridging to neutral 10 x  2 sets for 5 sec holds  - sidelying L hip abduction 3 x 10 AROM  - prone trunk extensions 5 x 2  - prone L knee flexion 10 x 2  - prone LE extensions 10 x 2  - Shuttle B LE extension 6 bands U/E support - TM 1.7-2. 0MPH x 7 minutes (cueing for full strides heel/toe)  - single leg stance on L LE 10 x 10 sec hold  - calf stretch on stairs 10 x 2  - L/R hip flexion stretch on stairs 10 x 2  - standing L knee flexion 10 x   - R/L hip AROM in standi standing L knee flexion 10 x   - stairclimbing x 4 steps x 5 rounds  - supine L hip and knee flexion, hip abduction PROM   - supine R/ L knee to chest 10 x 2  - supine L LE SLR 10 x 2  - supine L LE abduction 10 x 2  - supine single L LE bridging to [place] L foot up on step stool to tolerance 1 x 10x   - stairclimbing x 4 steps x 5 rounds  - CKC L fwd step up 8\" step 2 x 10 with B U/E support  - Shuttle L LE extension 5 bands 10 x 3  - Nustep L5 x 10min   - TM 1.5-1. 7MPH x 7 minutes (cueing for full strides supine L heel slides 2 x 10 AROM  - supine L knee to chest 10x  - supine single L LE bridging to neutral 10 x  2 sets for 5 sec holds  - sidelying L hip abduction 3 x 10 AROM  - seated L hip flexion 10 x 2  - seated trunk flexion to tolerance 1 x 10x   - C x10min  - TM walking with cues provided to improve step length, heelstrike / push off on R LE. 1-8 - 2.0mph   - stairclimbing with emphasis on equal alternating steps                                        Assessment: Patient and PT goals in progress.  Coco

## 2018-12-06 ENCOUNTER — OFFICE VISIT (OUTPATIENT)
Dept: PHYSICAL THERAPY | Age: 58
End: 2018-12-06
Attending: ORTHOPAEDIC SURGERY
Payer: COMMERCIAL

## 2018-12-06 PROCEDURE — 97116 GAIT TRAINING THERAPY: CPT | Performed by: PHYSICAL THERAPIST

## 2018-12-06 PROCEDURE — 97110 THERAPEUTIC EXERCISES: CPT | Performed by: PHYSICAL THERAPIST

## 2018-12-06 NOTE — PROGRESS NOTES
Diagnosis: Presence of left artificial hip joint (G66.971)    Date of surgery: 8/7/2018        Next MD visit: 10/15/2018  Fall Risk: standard         Precautions: n/a          Medication Changes since last visit?: No    Subjective: Reports no pain.  State R/ L knee to chest 10 x 2  - supine L LE SLR 10 x 2  - supine L LE abduction 10 x 2  - supine single L LE bridging to neutral 10 x  2 sets for 5 sec holds  - sidelying L hip abduction 3 x 10 AROM  - prone trunk extensions 5 x 2  - prone L knee flexion 10 x minutes   - standing trunk extensions 10x  - calf stretch on stairs 10 x 2  - L/R hip flexion stretch on stairs 10 x 2  - standing L knee flexion 10 x   - R/L hip AROM in standing with red theraband around knees 15 x  - stairclimbing x 4 steps x 5 rounds around knees 15 x  - stairclimbing x 4 steps x 5 rounds  - supine L hip and knee flexion, hip abduction PROM   - supine R/ L knee to chest 10 x 2  - supine L LE SLR 10 x 2  - supine L LE abduction 10 x 2  - supine single L LE bridging to neutral 10 x  2 se abduction 3 x 10 AROM  - prone trunk extensions 5 x 2  - prone L knee flexion 10 x 2  - seated trunk flexion with L foot up on step stool to tolerance 1 x 10x   - CKC L fwd step up 8\" step 2 x 10 with B U/E support  - Shuttle L LE extension 5 bands 10 x 3 standing 10 x  - stair climbing 4 steps 5x  - supine L QS sets 5\" holds 10x  - supine L hip and knee flexion PROM 10 x 2  - supine L LE SLR 10 x 2  - supine L knee to chest 10x  - supine single L LE bridging to neutral 10 x  2 sets for 5 sec holds  - side anterior/posterior pelvic tilt x 30 each   - Laurie curl up R/L 2 x 10 each   - supine L heel slides 2 x 10 AROM  - supine L QS/glute sets 5\" holds 2 x 10  - supine bridging to neutral 10 x  2 sets for 5 sec holds  - sidelying L hip abduction 3 x 10 AROM

## 2018-12-11 ENCOUNTER — OFFICE VISIT (OUTPATIENT)
Dept: PHYSICAL THERAPY | Age: 58
End: 2018-12-11
Attending: ORTHOPAEDIC SURGERY
Payer: COMMERCIAL

## 2018-12-11 ENCOUNTER — LAB ENCOUNTER (OUTPATIENT)
Dept: LAB | Age: 58
End: 2018-12-11
Attending: INTERNAL MEDICINE
Payer: COMMERCIAL

## 2018-12-11 DIAGNOSIS — T84.52XA PROSTHETIC JOINT INFECTION OF LEFT HIP (HCC): Primary | ICD-10-CM

## 2018-12-11 PROCEDURE — 85652 RBC SED RATE AUTOMATED: CPT

## 2018-12-11 PROCEDURE — 86140 C-REACTIVE PROTEIN: CPT

## 2018-12-11 PROCEDURE — 97116 GAIT TRAINING THERAPY: CPT | Performed by: PHYSICAL THERAPIST

## 2018-12-11 PROCEDURE — 36415 COLL VENOUS BLD VENIPUNCTURE: CPT

## 2018-12-11 PROCEDURE — 80053 COMPREHEN METABOLIC PANEL: CPT

## 2018-12-11 PROCEDURE — 97110 THERAPEUTIC EXERCISES: CPT | Performed by: PHYSICAL THERAPIST

## 2018-12-11 PROCEDURE — 97140 MANUAL THERAPY 1/> REGIONS: CPT | Performed by: PHYSICAL THERAPIST

## 2018-12-11 PROCEDURE — 85025 COMPLETE CBC W/AUTO DIFF WBC: CPT

## 2018-12-11 NOTE — PROGRESS NOTES
Diagnosis: Presence of left artificial hip joint (P87.979)    Date of surgery: 8/7/2018        Next MD visit: 10/15/2018  Fall Risk: standard         Precautions: n/a          Medication Changes since last visit?: No    Subjective: Complains of stiffness x 2  - L/R hip flexion stretch on stairs 10 x 2  - standing L knee flexion 10 x   - R/L hip AROM in standing with green theraband around knees 15 x  - heel raises on incline 10 x 2  - seated trunk flexion 10x  - supine L hip and knee flexion, hip abduction 5/5 throughout LE to improve tolerance to walking and standing. 4. Patient will resume all walking activities without a device and without gait deviations. Plan: Continue PT.       Charges: EX2, Gait1, Manual PT1    Total Timed Treatment: 45 m

## 2018-12-12 ENCOUNTER — PROCEDURE VISIT (OUTPATIENT)
Dept: NEUROLOGY | Facility: CLINIC | Age: 58
End: 2018-12-12

## 2018-12-12 DIAGNOSIS — M79.651 BILATERAL THIGH PAIN: ICD-10-CM

## 2018-12-12 DIAGNOSIS — M79.652 BILATERAL THIGH PAIN: ICD-10-CM

## 2018-12-12 PROCEDURE — 95912 NRV CNDJ TEST 11-12 STUDIES: CPT | Performed by: PHYSICAL MEDICINE & REHABILITATION

## 2018-12-12 PROCEDURE — 95886 MUSC TEST DONE W/N TEST COMP: CPT | Performed by: PHYSICAL MEDICINE & REHABILITATION

## 2018-12-13 ENCOUNTER — OFFICE VISIT (OUTPATIENT)
Dept: PHYSICAL THERAPY | Age: 58
End: 2018-12-13
Attending: ORTHOPAEDIC SURGERY
Payer: COMMERCIAL

## 2018-12-13 PROCEDURE — 97110 THERAPEUTIC EXERCISES: CPT | Performed by: PHYSICAL THERAPIST

## 2018-12-13 PROCEDURE — 97116 GAIT TRAINING THERAPY: CPT | Performed by: PHYSICAL THERAPIST

## 2018-12-13 PROCEDURE — 97140 MANUAL THERAPY 1/> REGIONS: CPT | Performed by: PHYSICAL THERAPIST

## 2018-12-13 NOTE — PROGRESS NOTES
Diagnosis: Presence of left artificial hip joint (D96.242)    Date of surgery: 8/7/2018        Next MD visit: 10/15/2018  Fall Risk: standard         Precautions: n/a          Medication Changes since last visit?: No    Subjective: States her back is bot provided to improve step length, heelstrike / push off on R LE. 1.8 - 2. 5mph   - stairclimbing with emphasis on equal alternating steps and less rail support going up and down stairs.    x10min  - TM walking with cues provided to improve step length, heelst

## 2018-12-18 ENCOUNTER — TELEPHONE (OUTPATIENT)
Dept: NEUROLOGY | Facility: CLINIC | Age: 58
End: 2018-12-18

## 2018-12-18 ENCOUNTER — OFFICE VISIT (OUTPATIENT)
Dept: PHYSICAL THERAPY | Age: 58
End: 2018-12-18
Attending: ORTHOPAEDIC SURGERY
Payer: COMMERCIAL

## 2018-12-18 PROCEDURE — 97110 THERAPEUTIC EXERCISES: CPT | Performed by: PHYSICAL THERAPIST

## 2018-12-18 PROCEDURE — 97116 GAIT TRAINING THERAPY: CPT | Performed by: PHYSICAL THERAPIST

## 2018-12-18 PROCEDURE — 97140 MANUAL THERAPY 1/> REGIONS: CPT | Performed by: PHYSICAL THERAPIST

## 2018-12-18 NOTE — PROGRESS NOTES
Diagnosis: Presence of left artificial hip joint (D41.594)    Date of surgery: 8/7/2018        Next MD visit: 10/15/2018  Fall Risk: standard         Precautions: n/a          Medication Changes since last visit?: No    Subjective: States her back and hi alternate forward lunges on BOSU 10 x 2  - sidestepping 20ft 3-4 rounds   x25min  - calf stretch on stairs 10 x 2  - L/R hip flexion stretch on stairs 10 x 2  - R/L hip abduction, extension in standing with green theraband around knees 15 x  - supine L hip pelvis in sidelying           Assessment: Improved tolerance to walking distance but still with slightly flexed posture. May benefit from continued PT for progression of therapeutic exercises and manual PT. Goals     • Therapy Goals      1.  Patient will

## 2018-12-18 NOTE — TELEPHONE ENCOUNTER
----- Message from Froont Search, DO sent at 12/18/2018  7:59 AM CST -----  Please ask this patient if she can come in at 7:30am next Wednesday or another day for an hour long appointment.  I need to verify a few of the EMG findings (no needles) and I'd lik

## 2018-12-20 ENCOUNTER — OFFICE VISIT (OUTPATIENT)
Dept: PHYSICAL THERAPY | Age: 58
End: 2018-12-20
Attending: ORTHOPAEDIC SURGERY
Payer: COMMERCIAL

## 2018-12-20 PROCEDURE — 97110 THERAPEUTIC EXERCISES: CPT | Performed by: PHYSICAL THERAPIST

## 2018-12-20 PROCEDURE — 97140 MANUAL THERAPY 1/> REGIONS: CPT | Performed by: PHYSICAL THERAPIST

## 2018-12-20 NOTE — PROGRESS NOTES
Physical Therapy Treatment and Progress Report    Patient Name: Shayy Titus, MRN: [id number]  Date: 11/19/2018  Referring Physician: Dr. Valentino Bali    Diagnosis: Presence of left artificial hip joint (I18.651)      Pt has attended 29 visits stretch on stairs 10 x 2  - L/R hip flexion stretch on stairs 10 x 2  - R/L hip abduction, extension in standing with green theraband around knees 15 x  - supine L hip and knee flexion, hip abduction, and SLR PROM   - supine R knee to chest 10 x 2  - supin sidelying L hip abduction 3 x 10 AROM  - prone trunk extensions 5 x 2  - prone L knee flexion 10 x 2  - prone R/L LE extensions 10 x 2  - Shuttle B LE extension 7 bands 10 x 3  - Shuttle L LE extension 4 bands 10 x 3  - CKC L fwd step up 8\" step 2 x 10 wi improve overall walking. Patient/Family/Caregiver was advised of these findings, precautions, and treatment options and has agreed to actively participate in planning and for this course of care.     Thank you for your referral. If you have any questi

## 2018-12-21 ENCOUNTER — TELEPHONE (OUTPATIENT)
Dept: FAMILY MEDICINE CLINIC | Facility: CLINIC | Age: 58
End: 2018-12-21

## 2018-12-21 DIAGNOSIS — M16.12 PRIMARY OSTEOARTHRITIS OF LEFT HIP: ICD-10-CM

## 2018-12-21 DIAGNOSIS — T84.018A PROSTHETIC HIP IMPLANT FAILURE, INITIAL ENCOUNTER: Primary | ICD-10-CM

## 2018-12-21 DIAGNOSIS — Z96.649 PROSTHETIC HIP IMPLANT FAILURE, INITIAL ENCOUNTER: Primary | ICD-10-CM

## 2018-12-21 NOTE — TELEPHONE ENCOUNTER
Per Lashanda the patient will is scheduled to have physical therapy on January 3, 2019. Lashanda states that the order and referral have to be generated by the pcp.

## 2018-12-22 ENCOUNTER — OFFICE VISIT (OUTPATIENT)
Dept: FAMILY MEDICINE CLINIC | Facility: CLINIC | Age: 58
End: 2018-12-22

## 2018-12-22 VITALS
HEIGHT: 67 IN | DIASTOLIC BLOOD PRESSURE: 92 MMHG | SYSTOLIC BLOOD PRESSURE: 142 MMHG | HEART RATE: 96 BPM | WEIGHT: 200 LBS | TEMPERATURE: 98 F | BODY MASS INDEX: 31.39 KG/M2

## 2018-12-22 DIAGNOSIS — F41.9 ANXIETY: ICD-10-CM

## 2018-12-22 DIAGNOSIS — F32.A DEPRESSIVE DISORDER: ICD-10-CM

## 2018-12-22 DIAGNOSIS — I10 ESSENTIAL HYPERTENSION: Primary | ICD-10-CM

## 2018-12-22 PROCEDURE — 99214 OFFICE O/P EST MOD 30 MIN: CPT | Performed by: FAMILY MEDICINE

## 2018-12-22 PROCEDURE — 99212 OFFICE O/P EST SF 10 MIN: CPT | Performed by: FAMILY MEDICINE

## 2018-12-22 RX ORDER — DULOXETIN HYDROCHLORIDE 60 MG/1
60 CAPSULE, DELAYED RELEASE ORAL DAILY
Qty: 90 CAPSULE | Refills: 1 | Status: SHIPPED | OUTPATIENT
Start: 2018-12-22 | End: 2019-10-15

## 2018-12-22 RX ORDER — LOSARTAN POTASSIUM 50 MG/1
50 TABLET ORAL DAILY
Qty: 90 TABLET | Refills: 1 | Status: SHIPPED | OUTPATIENT
Start: 2018-12-22 | End: 2019-07-06

## 2018-12-31 ENCOUNTER — OFFICE VISIT (OUTPATIENT)
Dept: PHYSICAL THERAPY | Age: 58
End: 2018-12-31
Attending: ORTHOPAEDIC SURGERY
Payer: COMMERCIAL

## 2018-12-31 PROCEDURE — 97110 THERAPEUTIC EXERCISES: CPT | Performed by: PHYSICAL THERAPIST

## 2018-12-31 NOTE — PROGRESS NOTES
Diagnosis: Presence of left artificial hip joint (N13.387)    Date of surgery: 8/7/2018        Next MD visit: 10/15/2018  Fall Risk: standard         Precautions: n/a          Medication Changes since last visit?: No    Subjective: Still complains of mary L hip abduction 3 x 10 AROM  - repeat standing trunk extensions 10x  - seated   - Shuttle B LE extension 7 bands 10 x 3  - Shuttle L LE extension 4 bands 10 x 3  - forward step up on 10 inch step 10 x 2  - CKC L fwd step up on BOSU 2 x 10 with B U/E suppor stairclimbing with emphasis on equal alternating steps and less rail support going up and down stairs. x10min  - TM walking with cues provided to improve step length, heelstrike / push off on R LE. 1-8 - 2. 2mph   - stairclimbing with emphasis on equal al

## 2019-01-03 ENCOUNTER — APPOINTMENT (OUTPATIENT)
Dept: PHYSICAL THERAPY | Age: 59
End: 2019-01-03
Attending: ORTHOPAEDIC SURGERY
Payer: COMMERCIAL

## 2019-01-08 ENCOUNTER — APPOINTMENT (OUTPATIENT)
Dept: PHYSICAL THERAPY | Age: 59
End: 2019-01-08
Attending: ORTHOPAEDIC SURGERY
Payer: COMMERCIAL

## 2019-01-10 ENCOUNTER — APPOINTMENT (OUTPATIENT)
Dept: PHYSICAL THERAPY | Age: 59
End: 2019-01-10
Attending: ORTHOPAEDIC SURGERY
Payer: COMMERCIAL

## 2019-01-15 ENCOUNTER — APPOINTMENT (OUTPATIENT)
Dept: PHYSICAL THERAPY | Age: 59
End: 2019-01-15
Attending: ORTHOPAEDIC SURGERY
Payer: COMMERCIAL

## 2019-01-17 ENCOUNTER — APPOINTMENT (OUTPATIENT)
Dept: PHYSICAL THERAPY | Age: 59
End: 2019-01-17
Attending: ORTHOPAEDIC SURGERY
Payer: COMMERCIAL

## 2019-01-22 ENCOUNTER — OFFICE VISIT (OUTPATIENT)
Dept: PHYSICAL THERAPY | Age: 59
End: 2019-01-22
Attending: ORTHOPAEDIC SURGERY
Payer: COMMERCIAL

## 2019-01-22 PROCEDURE — 97110 THERAPEUTIC EXERCISES: CPT | Performed by: PHYSICAL THERAPIST

## 2019-01-22 NOTE — PROGRESS NOTES
Diagnosis: Presence of left artificial hip joint (D63.320)    Date of surgery: 8/7/2018        Next MD visit: 10/15/2018  Fall Risk: standard         Precautions: n/a          Medication Changes since last visit?: No    Subjective: States her hip is doin flexion 10x  - Shuttle B LE extension 7 bands 10 x 3  - Shuttle L LE extension 4 bands 10 x 3  - forward step up on 10 inch step 10 x 2  - CKC L fwd step up on BOSU 2 x 10 with B U/E support  - alternate forward lunges on BOSU 10 x 2  - walking forward [employer] rounds     Gait training          x10min  - TM walking with cues provided to improve step length, heelstrike / push off on R LE. 1.8 - 2. 5mph   - stairclimbing with emphasis on equal alternating steps and less rail support going up and down stairs.      Man

## 2019-01-23 ENCOUNTER — OFFICE VISIT (OUTPATIENT)
Dept: PODIATRY CLINIC | Facility: CLINIC | Age: 59
End: 2019-01-23

## 2019-01-23 ENCOUNTER — OFFICE VISIT (OUTPATIENT)
Dept: NEUROLOGY | Facility: CLINIC | Age: 59
End: 2019-01-23

## 2019-01-23 DIAGNOSIS — B35.1 ONYCHOMYCOSIS: ICD-10-CM

## 2019-01-23 DIAGNOSIS — M79.674 PAIN IN TOES OF BOTH FEET: Primary | ICD-10-CM

## 2019-01-23 DIAGNOSIS — G89.29 CHRONIC BILATERAL LOW BACK PAIN WITHOUT SCIATICA: Primary | ICD-10-CM

## 2019-01-23 DIAGNOSIS — M79.675 PAIN IN TOES OF BOTH FEET: Primary | ICD-10-CM

## 2019-01-23 DIAGNOSIS — M54.50 CHRONIC BILATERAL LOW BACK PAIN WITHOUT SCIATICA: Primary | ICD-10-CM

## 2019-01-23 PROCEDURE — 11721 DEBRIDE NAIL 6 OR MORE: CPT | Performed by: PODIATRIST

## 2019-01-23 NOTE — PROGRESS NOTES
Discussed case with patient. She continues to have chronic bilateral low back pain described as a soreness that she attributes to scoliosis. To a lesser degree she also complains of aching in the anterior thighs.   Has been participating in physical therap

## 2019-01-23 NOTE — PROCEDURES
[address]  Phone: [phone]  Fax: [phone] REPORT          Patient: Jesús Altamirano Hand Dominance: Right  Patient ID: [id number] Referring Dr: Jaylen Bryant The needle EMG study was normal in all 14 tested muscles: L. Rectus femoris, L. Vastus lateralis, L. Vastus medialis, L. Tibialis anterior, L. Peroneus longus, L. Gastrocnemius (Medial head), L. L5 paraspinal, R. L5 paraspinal, R.  Rectus femoris, R. Vastus Lat leg Ankle 3.23 4.11 15.7 7.5 Lat leg - Ankle 14 43   R Lateral femoral cutaneous      Site 1  NR NR NR NR Site 1 - G1 12 NR   L Lateral femoral cutaneous      Site 1  NR NR NR NR Site 1 - G1  NR   R Saphenous - Ankle      Med leg Ankle 6.20 6.82 4. 9

## 2019-01-23 NOTE — PROGRESS NOTES
HPI:    Patient ID: Kwadwo Angel is a 66year old female. This 24-year-old female presents for care associated with her painful toenails. She has had relief by previous care.   She remains in physical therapy because of previous hip surgery and is not

## 2019-01-23 NOTE — PATIENT INSTRUCTIONS
Repeat Xrays to see if there is any change in the bones of the lower back. May want to consider working with [facility] in the [place] clinic for scoliosis.     Consider facet joint injections (back injections) depending upon the findings of the Xray and if

## 2019-01-24 ENCOUNTER — HOSPITAL ENCOUNTER (OUTPATIENT)
Dept: GENERAL RADIOLOGY | Age: 59
Discharge: HOME | End: 2019-01-24
Attending: PHYSICAL MEDICINE & REHABILITATION
Payer: COMMERCIAL

## 2019-01-24 ENCOUNTER — OFFICE VISIT (OUTPATIENT)
Dept: PHYSICAL THERAPY | Age: 59
End: 2019-01-24
Attending: ORTHOPAEDIC SURGERY
Payer: COMMERCIAL

## 2019-01-24 ENCOUNTER — TELEPHONE (OUTPATIENT)
Dept: NEUROLOGY | Facility: CLINIC | Age: 59
End: 2019-01-24

## 2019-01-24 DIAGNOSIS — M47.816 LUMBAR FACET ARTHROPATHY: Primary | ICD-10-CM

## 2019-01-24 DIAGNOSIS — M54.50 CHRONIC BILATERAL LOW BACK PAIN WITHOUT SCIATICA: ICD-10-CM

## 2019-01-24 DIAGNOSIS — G89.29 CHRONIC BILATERAL LOW BACK PAIN WITHOUT SCIATICA: ICD-10-CM

## 2019-01-24 PROCEDURE — 97110 THERAPEUTIC EXERCISES: CPT | Performed by: PHYSICAL THERAPIST

## 2019-01-24 PROCEDURE — 72110 X-RAY EXAM L-2 SPINE 4/>VWS: CPT | Performed by: PHYSICAL MEDICINE & REHABILITATION

## 2019-01-24 NOTE — TELEPHONE ENCOUNTER
----- Message from Pamela Gamble DO sent at 1/24/2019  2:42 PM CST -----  Please let the patient know I've reviewed the XR of the lower back - there is a mild amount of arthritis at two of the joints of the lower back where the curve is greatest (L3-4).  I

## 2019-01-24 NOTE — PROGRESS NOTES
Diagnosis: Presence of left artificial hip joint (J61.537)    Date of surgery: 8/7/2018        Next MD visit: 10/15/2018  Fall Risk: standard         Precautions: n/a          Medication Changes since last visit?: No    Subjective: States her hip is doin 2  - walking forward lunges 20ft x 2  - sidestepping x 30ft   - TM walking 1.8 - 2. 5mph  x45min  - TM walking 1.8 - 2. 5mph   - standing trunk extensions braced against rail 10x  - calf stretch on stairs 10 x 2  - L/R hip flexion stretch on stairs 10 x 2  -

## 2019-01-29 ENCOUNTER — TELEPHONE (OUTPATIENT)
Dept: NEUROLOGY | Facility: CLINIC | Age: 59
End: 2019-01-29

## 2019-01-29 NOTE — TELEPHONE ENCOUNTER
Received in basket from [email or web address]  advising of approval for bilateral L3-4 facet joint injections for one unit/DOS  Will inform Nursing.

## 2019-02-01 NOTE — TELEPHONE ENCOUNTER
Left detailed message for patient on cell phone notifying her of approval. Asked her to call the office if she would like to proceed with injections. 3rd attempt trying to reach patient.

## 2019-02-04 NOTE — TELEPHONE ENCOUNTER
If patient calls to schedule injection, please make sure she is ok and aware that we will do this under local.

## 2019-02-04 NOTE — TELEPHONE ENCOUNTER
Patient has been scheduled for a bilateral L3-4 facet joint injections under fluoroscopy, local  on  at the [facility]. Medications and allergies reviewed.  Patient informed to hold aspirins, nsaids, blood thinners, multivitamins, vitamin E and fish oils 3-7 days

## 2019-02-18 ENCOUNTER — OFFICE VISIT (OUTPATIENT)
Dept: SURGERY | Facility: CLINIC | Age: 59
End: 2019-02-18

## 2019-02-18 DIAGNOSIS — M47.816 LUMBAR FACET ARTHROPATHY: Primary | ICD-10-CM

## 2019-02-18 PROCEDURE — 64493 INJ PARAVERT F JNT L/S 1 LEV: CPT | Performed by: PHYSICAL MEDICINE & REHABILITATION

## 2019-02-18 NOTE — PROCEDURES
[facility] Z-JOINT/FACET INJECTIONS  NAME:  Darius Weeks    MR #:    [id number] :  6/15/1960     PHYSICIAN:  Inderjit Lindquist        Operative Report    DATE OF PROCEDURE: 2019   PREOPERATIVE DIAGNOSES: 1.  Lumbar discharge instructions and will follow up in the clinic as scheduled. Throughout the whole procedure, the patient's pulse oximetry and vital signs were monitored and they remained completely stable.   Also, throughout the whole procedure, prior to injectio

## 2019-02-23 ENCOUNTER — LAB ENCOUNTER (OUTPATIENT)
Dept: LAB | Age: 59
End: 2019-02-23
Attending: INTERNAL MEDICINE
Payer: COMMERCIAL

## 2019-02-23 DIAGNOSIS — T84.52XA PROSTHETIC JOINT INFECTION OF LEFT HIP (HCC): Primary | ICD-10-CM

## 2019-02-23 DIAGNOSIS — T81.40XA POSTOPERATIVE INFECTION: ICD-10-CM

## 2019-02-23 LAB
ALBUMIN SERPL-MCNC: 3.9 G/DL (ref 3.4–5)
ALBUMIN/GLOB SERPL: 1 {RATIO} (ref 1–2)
ALP LIVER SERPL-CCNC: 97 U/L (ref 46–118)
ALT SERPL-CCNC: 29 U/L (ref 13–56)
ANION GAP SERPL CALC-SCNC: 6 MMOL/L (ref 0–18)
AST SERPL-CCNC: 16 U/L (ref 15–37)
BASOPHILS # BLD AUTO: 0.07 X10(3) UL (ref 0–0.2)
BASOPHILS NFR BLD AUTO: 0.6 %
BILIRUB SERPL-MCNC: 0.7 MG/DL (ref 0.1–2)
BUN BLD-MCNC: 21 MG/DL (ref 7–18)
BUN/CREAT SERPL: 24.1 (ref 10–20)
CALCIUM BLD-MCNC: 9.4 MG/DL (ref 8.5–10.1)
CHLORIDE SERPL-SCNC: 110 MMOL/L (ref 98–107)
CO2 SERPL-SCNC: 26 MMOL/L (ref 21–32)
CREAT BLD-MCNC: 0.87 MG/DL (ref 0.55–1.02)
CRP SERPL-MCNC: <0.29 MG/DL (ref ?–0.3)
DEPRECATED RDW RBC AUTO: 47.9 FL (ref 35.1–46.3)
EOSINOPHIL # BLD AUTO: 0.19 X10(3) UL (ref 0–0.7)
EOSINOPHIL NFR BLD AUTO: 1.7 %
ERYTHROCYTE [DISTWIDTH] IN BLOOD BY AUTOMATED COUNT: 13.2 % (ref 11–15)
ERYTHROCYTE [SEDIMENTATION RATE] IN BLOOD: 5 MM/HR (ref 0–30)
GLOBULIN PLAS-MCNC: 3.8 G/DL (ref 2.8–4.4)
GLUCOSE BLD-MCNC: 89 MG/DL (ref 70–99)
HCT VFR BLD AUTO: 45.6 % (ref 35–48)
HGB BLD-MCNC: 14.5 G/DL (ref 12–16)
IMM GRANULOCYTES # BLD AUTO: 0.05 X10(3) UL (ref 0–1)
IMM GRANULOCYTES NFR BLD: 0.5 %
LYMPHOCYTES # BLD AUTO: 2.52 X10(3) UL (ref 1–4)
LYMPHOCYTES NFR BLD AUTO: 22.7 %
M PROTEIN MFR SERPL ELPH: 7.7 G/DL (ref 6.4–8.2)
MCH RBC QN AUTO: 31.5 PG (ref 26–34)
MCHC RBC AUTO-ENTMCNC: 31.8 G/DL (ref 31–37)
MCV RBC AUTO: 99.1 FL (ref 80–100)
MONOCYTES # BLD AUTO: 0.75 X10(3) UL (ref 0.1–1)
MONOCYTES NFR BLD AUTO: 6.8 %
NEUTROPHILS # BLD AUTO: 7.51 X10 (3) UL (ref 1.5–7.7)
NEUTROPHILS # BLD AUTO: 7.51 X10(3) UL (ref 1.5–7.7)
NEUTROPHILS NFR BLD AUTO: 67.7 %
OSMOLALITY SERPL CALC.SUM OF ELEC: 296 MOSM/KG (ref 275–295)
PLATELET # BLD AUTO: 368 10(3)UL (ref 150–450)
POTASSIUM SERPL-SCNC: 4.1 MMOL/L (ref 3.5–5.1)
RBC # BLD AUTO: 4.6 X10(6)UL (ref 3.8–5.3)
SODIUM SERPL-SCNC: 142 MMOL/L (ref 136–145)
WBC # BLD AUTO: 11.1 X10(3) UL (ref 4–11)

## 2019-02-23 PROCEDURE — 86140 C-REACTIVE PROTEIN: CPT

## 2019-02-23 PROCEDURE — 85652 RBC SED RATE AUTOMATED: CPT

## 2019-02-23 PROCEDURE — 80053 COMPREHEN METABOLIC PANEL: CPT

## 2019-02-23 PROCEDURE — 85025 COMPLETE CBC W/AUTO DIFF WBC: CPT

## 2019-02-23 PROCEDURE — 36415 COLL VENOUS BLD VENIPUNCTURE: CPT

## 2019-04-15 ENCOUNTER — OFFICE VISIT (OUTPATIENT)
Dept: FAMILY MEDICINE CLINIC | Facility: CLINIC | Age: 59
End: 2019-04-15
Payer: COMMERCIAL

## 2019-04-15 ENCOUNTER — NURSE TRIAGE (OUTPATIENT)
Dept: FAMILY MEDICINE CLINIC | Facility: CLINIC | Age: 59
End: 2019-04-15

## 2019-04-15 VITALS
TEMPERATURE: 98 F | HEIGHT: 65 IN | WEIGHT: 215 LBS | RESPIRATION RATE: 20 BRPM | HEART RATE: 96 BPM | SYSTOLIC BLOOD PRESSURE: 110 MMHG | DIASTOLIC BLOOD PRESSURE: 79 MMHG | BODY MASS INDEX: 35.82 KG/M2

## 2019-04-15 DIAGNOSIS — J02.9 SORE THROAT: ICD-10-CM

## 2019-04-15 DIAGNOSIS — J06.9 ACUTE URI: ICD-10-CM

## 2019-04-15 PROCEDURE — 99212 OFFICE O/P EST SF 10 MIN: CPT | Performed by: FAMILY MEDICINE

## 2019-04-15 PROCEDURE — 99213 OFFICE O/P EST LOW 20 MIN: CPT | Performed by: FAMILY MEDICINE

## 2019-04-15 RX ORDER — AMOXICILLIN 875 MG/1
875 TABLET, COATED ORAL 2 TIMES DAILY
Qty: 20 TABLET | Refills: 0 | Status: SHIPPED | OUTPATIENT
Start: 2019-04-15 | End: 2019-10-15 | Stop reason: ALTCHOICE

## 2019-04-15 NOTE — TELEPHONE ENCOUNTER
Action Requested: Summary for Provider     []  Critical Lab, Recommendations Needed  [] Need Additional Advice  []   FYI    []   Need Orders  [] Need Medications Sent to Pharmacy  []  Other     SUMMARY: patient c/o sore throat, cough, for 5 days, with nasa

## 2019-04-15 NOTE — PROGRESS NOTES
HPI:    Patient ID: Sanjay Phillips is a 66year old female. Pt presents with cold symptoms for 3-4 days. Pt has had cough, sore throat. No fevers. Pt has tried otc remedies without relief. Pt states sick contacts. Has had some pain with swallowing.

## 2019-06-03 ENCOUNTER — TELEPHONE (OUTPATIENT)
Dept: CASE MANAGEMENT | Age: 59
End: 2019-06-03

## 2019-07-06 ENCOUNTER — TELEPHONE (OUTPATIENT)
Dept: FAMILY MEDICINE CLINIC | Facility: CLINIC | Age: 59
End: 2019-07-06

## 2019-07-06 DIAGNOSIS — I10 ESSENTIAL HYPERTENSION: ICD-10-CM

## 2019-07-06 RX ORDER — LOSARTAN POTASSIUM 50 MG/1
TABLET ORAL
Qty: 90 TABLET | Refills: 1 | Status: SHIPPED | OUTPATIENT
Start: 2019-07-06 | End: 2020-02-09

## 2019-07-06 NOTE — TELEPHONE ENCOUNTER
Patient advised of refill sent to pharmacy and request for follow up appt with Dr Klein Seen, pt agreed and will return call to schedule follow up appointment. Denied any concerns with medication at this time.

## 2019-07-06 NOTE — TELEPHONE ENCOUNTER
Pt calling to refill medication states she is out of medicine       Losartan Potassium 50 MG Oral Tab         Summary: Take 1 tablet (

## 2019-07-06 NOTE — TELEPHONE ENCOUNTER
Refill passed per [facility], [facility] protocol.   Hypertensive Medications  Protocol Criteria:  · Appointment scheduled in the past 6 months or in the next 3 months  · BMP or CMP in the past 12 months  · Creatinine result < 2  Recent Outpatient Visits

## 2019-08-07 ENCOUNTER — TELEPHONE (OUTPATIENT)
Dept: CASE MANAGEMENT | Age: 59
End: 2019-08-07

## 2019-08-07 DIAGNOSIS — Z12.11 COLON CANCER SCREENING: Primary | ICD-10-CM

## 2019-08-07 NOTE — TELEPHONE ENCOUNTER
Let her know she needs to get a colonoscopy done and has not had it done yet. I redid the referral.  However I was supposed to see her in February 2019.   Please make an appointment in the next 1 to 3 weeks for follow-up exam on her medical conditions,  no

## 2019-08-07 NOTE — TELEPHONE ENCOUNTER
Patient is due for colorectal screening. Please order FIT or colonoscopy if appropriate. Thank you. Orders pending.

## 2019-10-15 ENCOUNTER — OFFICE VISIT (OUTPATIENT)
Dept: FAMILY MEDICINE CLINIC | Facility: CLINIC | Age: 59
End: 2019-10-15
Payer: COMMERCIAL

## 2019-10-15 VITALS
SYSTOLIC BLOOD PRESSURE: 132 MMHG | HEIGHT: 65 IN | WEIGHT: 229 LBS | DIASTOLIC BLOOD PRESSURE: 90 MMHG | TEMPERATURE: 97 F | HEART RATE: 82 BPM | BODY MASS INDEX: 38.15 KG/M2

## 2019-10-15 DIAGNOSIS — F32.A DEPRESSIVE DISORDER: ICD-10-CM

## 2019-10-15 DIAGNOSIS — J06.9 VIRAL URI WITH COUGH: Primary | ICD-10-CM

## 2019-10-15 PROCEDURE — 99213 OFFICE O/P EST LOW 20 MIN: CPT | Performed by: FAMILY MEDICINE

## 2019-10-15 RX ORDER — DULOXETIN HYDROCHLORIDE 60 MG/1
60 CAPSULE, DELAYED RELEASE ORAL DAILY
Qty: 90 CAPSULE | Refills: 1 | Status: SHIPPED | OUTPATIENT
Start: 2019-10-15 | End: 2021-07-27

## 2019-10-15 NOTE — PROGRESS NOTES
Patient presents with:  Cough: c/o sore throat, stuffy nose, and congestion    HPI:   Qasim Mills is a 64year old female who presents to clinic with complaints of productive cough, chest congestion, nasal congestion, mildly sore throat, mild headache capsule;  Refill: 1    Claudene Loop, MD  10/15/2019  10:42 AM

## 2019-11-12 NOTE — PROGRESS NOTES
WORKS IN OFFICE  GOOD SUPPORT SYSTEM    02/16/17  PRESENTS AMBULATORY TO CPM;  NEW CONSULT C/O LLBP WITH LT THIGH RADICULAR PAIN;  MRI INDICATIONS:  Lower back pain that radiates down left leg for 1 year. No injury.  Sciatica despite greater than 6 Patient with admitting diagnosis of fracture of unspecific part of neck of unspecified femur. S/p Left hip hemiarthroplasty.

## 2020-02-08 DIAGNOSIS — I10 ESSENTIAL HYPERTENSION: ICD-10-CM

## 2020-02-09 RX ORDER — LOSARTAN POTASSIUM 50 MG/1
TABLET ORAL
Qty: 90 TABLET | Refills: 1 | Status: SHIPPED | OUTPATIENT
Start: 2020-02-09 | End: 2020-10-01

## 2020-02-09 NOTE — TELEPHONE ENCOUNTER
Refill passed per [facility], [facility] protocol.   Hypertensive Medications  Protocol Criteria:  · Appointment scheduled in the past 6 months or in the next 3 months  · BMP or CMP in the past 12 months  · Creatinine result < 2  Recent Outpatient Visits

## 2020-07-20 ENCOUNTER — OFFICE VISIT (OUTPATIENT)
Dept: PODIATRY CLINIC | Facility: CLINIC | Age: 60
End: 2020-07-20
Payer: COMMERCIAL

## 2020-07-20 DIAGNOSIS — B35.1 ONYCHOMYCOSIS: ICD-10-CM

## 2020-07-20 DIAGNOSIS — M79.674 PAIN IN TOES OF BOTH FEET: Primary | ICD-10-CM

## 2020-07-20 DIAGNOSIS — M79.675 PAIN IN TOES OF BOTH FEET: Primary | ICD-10-CM

## 2020-07-20 PROCEDURE — 11721 DEBRIDE NAIL 6 OR MORE: CPT | Performed by: PODIATRIST

## 2020-07-20 NOTE — PROGRESS NOTES
HPI:    Patient ID: Pauline Bobby is a 64year old female. This 82-year-old female presents complaining of pain associated with her toenails. The last time I saw this patient was approximately 1-1/2 years ago.   She states by trimming them she has had

## 2020-09-28 ENCOUNTER — TELEPHONE (OUTPATIENT)
Dept: FAMILY MEDICINE CLINIC | Facility: CLINIC | Age: 60
End: 2020-09-28

## 2020-09-28 DIAGNOSIS — I10 ESSENTIAL HYPERTENSION: ICD-10-CM

## 2020-10-02 RX ORDER — LOSARTAN POTASSIUM 50 MG/1
50 TABLET ORAL DAILY
Qty: 90 TABLET | Refills: 0 | Status: SHIPPED | OUTPATIENT
Start: 2020-10-02 | End: 2020-12-28

## 2020-10-12 ENCOUNTER — OFFICE VISIT (OUTPATIENT)
Dept: PODIATRY CLINIC | Facility: CLINIC | Age: 60
End: 2020-10-12
Payer: COMMERCIAL

## 2020-10-12 DIAGNOSIS — M79.675 PAIN IN TOES OF BOTH FEET: Primary | ICD-10-CM

## 2020-10-12 DIAGNOSIS — M79.674 PAIN IN TOES OF BOTH FEET: Primary | ICD-10-CM

## 2020-10-12 DIAGNOSIS — B35.1 ONYCHOMYCOSIS: ICD-10-CM

## 2020-10-12 PROCEDURE — 11721 DEBRIDE NAIL 6 OR MORE: CPT | Performed by: PODIATRIST

## 2020-10-12 NOTE — PROGRESS NOTES
HPI:    Patient ID: Vazquez Meneses is a 64year old female. This 71-year-old female presents for care associated with her painful toenails. She reports relief by previous care and I saw her back in July of this year.   Patient had hip replacement with

## 2020-12-26 DIAGNOSIS — I10 ESSENTIAL HYPERTENSION: ICD-10-CM

## 2020-12-26 NOTE — TELEPHONE ENCOUNTER
Current Outpatient Medications:   •  losartan Potassium 50 MG Oral Tab, Take 1 tablet (50 mg total) by mouth daily.  Please follow up for future refill, Disp: 90 tablet, Rfl: 0

## 2020-12-28 RX ORDER — LOSARTAN POTASSIUM 50 MG/1
50 TABLET ORAL DAILY
Qty: 90 TABLET | Refills: 0 | Status: SHIPPED | OUTPATIENT
Start: 2020-12-28 | End: 2021-06-01

## 2021-01-18 ENCOUNTER — OFFICE VISIT (OUTPATIENT)
Dept: PODIATRY CLINIC | Facility: CLINIC | Age: 61
End: 2021-01-18
Payer: COMMERCIAL

## 2021-01-18 DIAGNOSIS — M79.675 PAIN IN TOES OF BOTH FEET: Primary | ICD-10-CM

## 2021-01-18 DIAGNOSIS — M79.674 PAIN IN TOES OF BOTH FEET: Primary | ICD-10-CM

## 2021-01-18 DIAGNOSIS — B35.1 ONYCHOMYCOSIS: ICD-10-CM

## 2021-01-18 PROCEDURE — 11721 DEBRIDE NAIL 6 OR MORE: CPT | Performed by: PODIATRIST

## 2021-01-18 NOTE — PROGRESS NOTES
HPI:    Patient ID: Nehal Yung is a 64year old female. 73-year-old female presents with recurrent pain associated with her toenails. The last time I saw her was October 12 of 2020. She states that after treatment she had relief of symptom.     ROS

## 2021-02-20 NOTE — PROGRESS NOTES
Patient presents for follow-up. She is doing better with regards to pain and drainage from last week. There is still some slight drainage particularly superiorly in the wound. The drainage is man in color and does not appear purulent.   She is neurova 98.2

## 2021-03-15 ENCOUNTER — OFFICE VISIT (OUTPATIENT)
Dept: PODIATRY CLINIC | Facility: CLINIC | Age: 61
End: 2021-03-15
Payer: COMMERCIAL

## 2021-03-15 DIAGNOSIS — M79.675 PAIN IN TOES OF BOTH FEET: Primary | ICD-10-CM

## 2021-03-15 DIAGNOSIS — B35.1 ONYCHOMYCOSIS: ICD-10-CM

## 2021-03-15 DIAGNOSIS — M79.674 PAIN IN TOES OF BOTH FEET: Primary | ICD-10-CM

## 2021-03-15 PROCEDURE — 11721 DEBRIDE NAIL 6 OR MORE: CPT | Performed by: PODIATRIST

## 2021-03-15 NOTE — PROGRESS NOTES
HPI:    Patient ID: Jigna Tolbert is a 64year old female. 53-year-old female presents for care associated with her painful toenails. The last time I saw this patient was January 18, 2021. She reports relief by previous treatment.     ROS:     I did r

## 2021-05-24 ENCOUNTER — OFFICE VISIT (OUTPATIENT)
Dept: PODIATRY CLINIC | Facility: CLINIC | Age: 61
End: 2021-05-24
Payer: COMMERCIAL

## 2021-05-24 DIAGNOSIS — M79.674 PAIN IN TOES OF BOTH FEET: Primary | ICD-10-CM

## 2021-05-24 DIAGNOSIS — M79.675 PAIN IN TOES OF BOTH FEET: Primary | ICD-10-CM

## 2021-05-24 DIAGNOSIS — B35.1 ONYCHOMYCOSIS: ICD-10-CM

## 2021-05-24 PROCEDURE — 11721 DEBRIDE NAIL 6 OR MORE: CPT | Performed by: PODIATRIST

## 2021-05-24 NOTE — PROGRESS NOTES
HPI:    Patient ID: Yousif Grandchild is a 64year old female. 16-year-old female presents for recurrent frustrations associated with her fungus toenails. Last time I saw this patient was March 15. She reports relief by previous care.       ROS:

## 2021-05-26 DIAGNOSIS — I10 ESSENTIAL HYPERTENSION: ICD-10-CM

## 2021-05-26 NOTE — TELEPHONE ENCOUNTER
•  losartan Potassium 50 MG Oral Tab, Take 1 tablet (50 mg total) by mouth daily.  Please follow up for future refill, Disp: 90 tablet, Rfl: 0

## 2021-05-28 NOTE — TELEPHONE ENCOUNTER
2nd attempt/left voice mail message for pt to call back. When the patient returns the call please see message below.

## 2021-05-29 NOTE — TELEPHONE ENCOUNTER
Pt scheduled OV 6/12/21 with Dr Mary Lorenzana. Pt states she will be out of medication soon.     Rx pended

## 2021-05-31 RX ORDER — LOSARTAN POTASSIUM 50 MG/1
50 TABLET ORAL DAILY
Qty: 90 TABLET | Refills: 0 | OUTPATIENT
Start: 2021-05-31

## 2021-05-31 NOTE — TELEPHONE ENCOUNTER
She has not been seen since 2018. She needs to make an appointment and get her in within the next 30 days. Otherwise she will need to find a different physician.

## 2021-06-01 RX ORDER — LOSARTAN POTASSIUM 50 MG/1
50 TABLET ORAL DAILY
Qty: 90 TABLET | Refills: 0 | Status: SHIPPED | OUTPATIENT
Start: 2021-06-01 | End: 2021-07-23

## 2021-06-01 NOTE — TELEPHONE ENCOUNTER
Dr. Cristina Lai - want to clarify that you won't refill Losartan, even a partial refill before upcoming appointment? Patient does not have any more tablets left currently. Please advise, thank you.    Please reply to pool: EM RN TRIAGE    Patient calling because

## 2021-06-02 NOTE — TELEPHONE ENCOUNTER
I was unaware that she had an appointment. I sent in a 90-day prescription. Please keep the appointment.

## 2021-06-02 NOTE — TELEPHONE ENCOUNTER
Pt calling to follow-up on her request.  Informed her Rx was sent to her pharmacy and to make sure to keep appointment. She verbalized understanding.

## 2021-06-12 ENCOUNTER — OFFICE VISIT (OUTPATIENT)
Dept: FAMILY MEDICINE CLINIC | Facility: CLINIC | Age: 61
End: 2021-06-12
Payer: COMMERCIAL

## 2021-06-12 VITALS
HEIGHT: 65 IN | BODY MASS INDEX: 41.48 KG/M2 | HEART RATE: 89 BPM | DIASTOLIC BLOOD PRESSURE: 90 MMHG | SYSTOLIC BLOOD PRESSURE: 162 MMHG | WEIGHT: 249 LBS

## 2021-06-12 DIAGNOSIS — Z96.642 HISTORY OF TOTAL HIP REPLACEMENT, LEFT: ICD-10-CM

## 2021-06-12 DIAGNOSIS — E66.01 MORBID OBESITY DUE TO EXCESS CALORIES (HCC): ICD-10-CM

## 2021-06-12 DIAGNOSIS — G89.29 CHRONIC RIGHT HIP PAIN: ICD-10-CM

## 2021-06-12 DIAGNOSIS — J30.89 OTHER ALLERGIC RHINITIS: ICD-10-CM

## 2021-06-12 DIAGNOSIS — I10 ESSENTIAL HYPERTENSION, BENIGN: Primary | ICD-10-CM

## 2021-06-12 DIAGNOSIS — Z12.4 CERVICAL CANCER SCREENING: ICD-10-CM

## 2021-06-12 DIAGNOSIS — M25.551 CHRONIC RIGHT HIP PAIN: ICD-10-CM

## 2021-06-12 PROCEDURE — 3008F BODY MASS INDEX DOCD: CPT | Performed by: FAMILY MEDICINE

## 2021-06-12 PROCEDURE — 3080F DIAST BP >= 90 MM HG: CPT | Performed by: FAMILY MEDICINE

## 2021-06-12 PROCEDURE — 3077F SYST BP >= 140 MM HG: CPT | Performed by: FAMILY MEDICINE

## 2021-06-12 PROCEDURE — 99214 OFFICE O/P EST MOD 30 MIN: CPT | Performed by: FAMILY MEDICINE

## 2021-06-12 RX ORDER — FLUTICASONE PROPIONATE 50 MCG
2 SPRAY, SUSPENSION (ML) NASAL DAILY
Qty: 3 EACH | Refills: 2 | Status: SHIPPED | OUTPATIENT
Start: 2021-06-12 | End: 2021-10-10

## 2021-06-12 RX ORDER — MONTELUKAST SODIUM 10 MG/1
10 TABLET ORAL NIGHTLY
Qty: 90 TABLET | Refills: 1 | Status: SHIPPED | OUTPATIENT
Start: 2021-06-12 | End: 2021-12-09

## 2021-06-12 NOTE — PROGRESS NOTES
Patient ID: Chio Burns is a 64year old female. HPI  Patient presents with:  Hypertension: follow up    Last seen by me on 12/22/2018. Pt works doing pensions.  Pt's daughter recently graduated from [employer] and is working as a therapist.     Pt c/ breath. Cardiovascular: Negative for chest pain. Musculoskeletal: Positive for arthralgias. Allergic/Immunologic: Positive for environmental allergies.            Medical History:      Past Medical History:   Diagnosis Date   • Anxiety state    • Denia and ear canal normal.   Nose: No rhinorrhea. Pale boggy nasal mucosa with mild swelling bilaterally. Throat: Cobblestoning. Eyes: Conjunctivae and EOM are normal.   Cardiovascular: Normal rate, regular rhythm and normal heart sounds.     Pulmonary/Chest: Specialty:PHYSIATRY          Number of Visits Requested:3      [facility] - INTERNAL          Referral Priority:Routine          Referral Type:OFFICE VISIT          Referred to Provider:Kristian Black MD          Requested Specialty:OBSTETRICS & GYNECOLOGY

## 2021-06-12 NOTE — PATIENT INSTRUCTIONS
Start taking 2 of your losartan in the morning to equal 100 mg daily. BLOOD PRESSURE CUFF    Get a blood pressure cuff that goes on the arm. Do not get the wrist cuff.   Check your blood pressures on your left arm 2

## 2021-07-14 NOTE — PROGRESS NOTES
Pulmonary Critical Care Consult    Patient: Lori Carrion Date: 2021   : 1991 Attending: Neo Campos MD   ? ?   Admission date: 2021    Chief Complaint: chest pain and dyspnea    HPI: Lori Carrion is a 30 year old female with a history significant for  has a past medical history of ADHD, Blood clot associated with vein wall inflammation, No known problems, and Sleep apnea. who presents with shortness of breath for the past four days. Pt states she is having sharp chest pains and is having difficulty speaking in full sentences. States chest pain radiates to the back. No nausea or vomiting. No fevers or chills.  Denies diaphoresis.  Symptoms exacerbated by exertion.  Symptoms relieved by rest.  pt was on Anti coagulation for DVT and stopped on her own 2 wks ago. Chest xray showed cardiomegally with slight elevation of pro BNP. 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 CT scan chest negative for PE.  Chest is large BOOPS   And feels same pain when off Bra .   PMHx:   Past Medical History:   Diagnosis Date   • ADHD    • Blood clot associated with vein wall inflammation    • No known problems    • Sleep apnea       Past Surgical History:   Procedure Laterality Date   •  procedures     • No past surgeries     • Tonsillectomy     • Tonsillectomy       Medications Prior to Admission   Medication Sig Dispense Refill   • rivaroxaban (XARELTO) 20 MG Tab Take 1 tablet by mouth daily (with breakfast). 30 tablet 3   • triamcinolone (ARISTOCORT) 0.1 % ointment Apply topically 2 times daily. 30 g 0   • acetaminophen-codeine (TYLENOL NO.3) 300-30 MG per tablet Take 1 tablet by mouth every 4 to 6 hours as needed for pain. 30 tablet 0   • EPINEPHrine (EpiPen 2-Yousuf) 0.3 MG/0.3ML auto-injector Inject 0.3 mLs into the muscle as needed for Anaphylaxis. INJECT 0.3ML INTRAMUSCULARLY AS DIRECTED 2 each 0   • diphenhydrAMINE (BENADRYL) 25 MG capsule Take 25 mg by mouth.     • triamcinolone (KENALOG)  S: Its all too much. Pt's hemoglobin was low yesterday - She reports no shortness of breath but does report dizziness on standing. RN will re-evaluate this and consider transfusion if she is symptomatic.     O: Blood pressure 102/59, pulse 69, temperature 0.5 % cream Apply topically 2 times daily. 30 g 2   • amphetamine-dextroamphetamine (ADDERALL XR) 30 MG 24 hr capsule Take 30 mg by mouth daily.       ALLERGIES:   Allergen Reactions   • Latex HIVES   • No Known Allergies Other (See Comments)      Social History     Tobacco Use   • Smoking status: Never Smoker   • Smokeless tobacco: Never Used   Substance Use Topics   • Alcohol use: Yes      Family History   Problem Relation Age of Onset   • Hypertension Mother    • Diabetes Maternal Grandmother    • Congestive Heart Failure Other         PERTINENT DIAGNOSTICS/PROCEDURES:    NM LUNG PERFUSION IMAGING    Result Date: 7/13/2021  EXAM: Nuclear Medicine Perfusion only scan INDICATION: Short of breath COMPARISON X-Ray: Chest x-ray earlier same day COMPARISON Other: Nothing TECHNIQUE: IV Technetium 99 MAA millicuries: 4.9 Inhaled Xenon-133 millicuries: none given Did a tele-radiologist issue a preliminary report: No. FINDINGS:  No perfusion defect. IMPRESSION (based upon Modified PIOPED 2 or Perfusion-only Modified PIOPED 2 criteria):  No evidence of pulmonary embolism. Electronically Signed by: WILL TINEO M.D. Signed on: 7/13/2021 11:37 PM     XR CHEST PA AND LATERAL 2 VIEWS    Result Date: 7/13/2021  PROCEDURE: CHEST, TWO VIEWS (PA and Lateral), 7/13/2021 8:04 PM CLINICAL INDICATION: Chest Pain, Shortness of Breath COMPARISON: None. FINDINGS: Haziness at the lung bases.  The cardiomediastinal silhouette is unremarkable.     Haziness of the lung bases.  Underlying infiltrate cannot be excluded. Recommend short-term chest x-ray follow-up. Electronically Signed by: YA MATTHEWS Signed on: 7/13/2021 8:09 PM     CTA CHEST PULMONARY EMBOLISM W CONTRAST    Result Date: 7/14/2021  Exam:  CTA Chest Indication: Short of breath and chest pain Comparison: Ventilation/perfusion scan from earlier same episode reporting no evidence of pulmonary embolism. Chest x-ray from earlier same episode. Did a teleradiologist give a  preliminary report? No. CT Chest Pulmonary Angiogram technique: 3-D Post Processing is performed including MIPS imaging by technologist processed on the acquiring workstation. Omnipaque 350 IV contrast volume: 75  cc. Findings:  Excellent opacification of left and right intrathoracic circulation. No filling defect in main through subsegmental divisions of pulmonary artery. Normal caliber pulmonary vessels. Heart size normal. No intracardiac thrombus. Aortic caliber normal. No dissection.  Visualized part of thyroid normal. Chest wall obesity. No anasarca.  Numerous enlarged lymph nodes in the axilla bilaterally largest on the left 2.0 cm. Prominent slightly visualized left cervical adenopathy. No distinct hilar adenopathy. Small mediastinal and epicardial fat nodes. Spleen size normal. Small left pleural effusion.  No right pleural or pericardial effusion. Esophagus and GE junction normal.  Bones joints and spine unremarkable. No actionable pulmonary nodule. No emphysematous change. Trachea and bronchi normal. Compressive atelectasis at lung bases, segmental on the left and subsegmental on the right.     1. Nonspecific axillary and perhaps cervical adenopathy. Considerations should include inflammatory and infectious etiologies and less likely lymphoma. 2. Small left pleural effusion. 3. Bilateral basilar atelectasis greater on the left. 4. No evidence of pulmonary embolism. Electronically Signed by: WILL TINEO M.D. Signed on: 7/14/2021 12:22 AM     [facility] EXTREMITY LOWER VENOUS DUPLEX    Result Date: 7/14/2021  EXAM: [facility] LOWER EXTREMITY VENOUS DUPLEX BILATERAL CLINICAL INDICATION: Elevated d-dimer. COMPARISON:  Lower extremity ultrasound on 05/01/2021. TECHNIQUE: Duplex venous ultrasound examination of the lower extremities was performed.  Preliminary report issued by on-call radiologist. FINDINGS:  The bilateral common femoral veins to the popliteal veins are compressible and demonstrate Doppler flow.  They  are phasic.  Bilateral calf veins are grossly patent. Previously seen nonocclusive thrombus in the left popliteal through the calf veins is not visualized on the current examination.     No evidence of deep venous thrombosis in the lower extremities. If symptoms suggesting DVT persist, a follow-up ultrasound study is recommended the next day.  If this, too, is negative and symptoms persist, a final ultrasound is recommended at one week.  Two negative exams one week apart is considered sufficient to exclude DVT. (The rationale for the repeat exams is that an undetectable calf vein thrombus may propagate superiorly and become a threat for clinically significant pulmonary embolism, but will be sonographically detectable once it reaches the popliteal vein.) FOR PHYSICIAN USE ONLY - Please note that this report was generated using voice recognition software.  If you require clarification or feel that there has been an error in this report please contact me through [employer].  Thank you very much for allowing me to participate in the care of your patient. Electronically Signed by: YA MATTHEWS Signed on: 7/14/2021 7:11 AM       Recent Results (from the past 24 hour(s))   HCG POC    Collection Time: 07/13/21  6:57 PM   Result Value Ref Range    HCG, URINE - POINT OF CARE Negative Negative   Electrocardiogram 12-Lead    Collection Time: 07/13/21  7:22 PM   Result Value Ref Range    Ventricular Rate EKG/Min (BPM) 117     Atrial Rate (BPM) 117     MS-Interval (MSEC) 124     QRS-Interval (MSEC) 80     QT-Interval (MSEC) 316     QTc 441     P Axis (Degrees) 50     R Axis (Degrees) -4     T Axis (Degrees) -56     REPORT TEXT       Sinus tachycardia  Cannot rule out  Anterior infarct  , age undetermined  T wave abnormality, consider inferolateral ischemia  Abnormal ECG  No previous ECGs available     Comprehensive Metabolic Panel    Collection Time: 07/13/21  7:35 PM   Result Value Ref Range    Fasting Status      Sodium  139 135 - 145 mmol/L    Potassium 4.3 3.4 - 5.1 mmol/L    Chloride 102 98 - 107 mmol/L    Carbon Dioxide 28 21 - 32 mmol/L    Anion Gap 13 10 - 20 mmol/L    Glucose 87 65 - 99 mg/dL    BUN 6 6 - 20 mg/dL    Creatinine 0.59 0.51 - 0.95 mg/dL    Glomerular Filtration Rate >90 >90 mL/min/1.73m2    BUN/ Creatinine Ratio 10 7 - 25    Calcium 9.1 8.4 - 10.2 mg/dL    Bilirubin, Total 0.3 0.2 - 1.0 mg/dL    GOT/AST 18 <=37 Units/L    GPT/ALT 17 <64 Units/L    Alkaline Phosphatase 111 45 - 117 Units/L    Albumin 2.5 (L) 3.6 - 5.1 g/dL    Protein, Total 8.1 6.4 - 8.2 g/dL    Globulin 5.6 (H) 2.0 - 4.0 g/dL    A/G Ratio 0.4 (L) 1.0 - 2.4   Troponin I Ultra Sensitive    Collection Time: 07/13/21  7:35 PM   Result Value Ref Range    Troponin I, Ultra Sensitive <0.02 <=0.04 ng/mL   CBC with Automated Differential (performable only)    Collection Time: 07/13/21  7:35 PM   Result Value Ref Range    WBC 8.9 4.2 - 11.0 K/mcL    RBC 3.98 (L) 4.00 - 5.20 mil/mcL    HGB 10.9 (L) 12.0 - 15.5 g/dL    HCT 33.7 (L) 36.0 - 46.5 %    MCV 84.7 78.0 - 100.0 fl    MCH 27.4 26.0 - 34.0 pg    MCHC 32.3 32.0 - 36.5 g/dL    RDW-CV 14.4 11.0 - 15.0 %    RDW-SD 44.5 39.0 - 50.0 fL     140 - 450 K/mcL    NRBC 0 <=0 /100 WBC    Neutrophil, Percent 68 %    Lymphocytes, Percent 20 %    Mono, Percent 11 %    Eosinophils, Percent 1 %    Basophils, Percent 0 %    Immature Granulocytes 0 %    Absolute Neutrophils 6.0 1.8 - 7.7 K/mcL    Absolute Lymphocytes 1.8 1.0 - 4.8 K/mcL    Absolute Monocytes 1.0 (H) 0.3 - 0.9 K/mcL    Absolute Eosinophils  0.1 0.0 - 0.5 K/mcL    Absolute Basophils 0.0 0.0 - 0.3 K/mcL    Absolute Immmature Granulocytes 0.0 0.0 - 0.2 K/mcL   Prothrombin Time    Collection Time: 07/13/21  9:48 PM   Result Value Ref Range    Prothrombin Time 13.3 (H) 9.7 - 11.8 sec    INR 1.3     Partial Thromboplastin Time    Collection Time: 07/13/21  9:48 PM   Result Value Ref Range    PTT 40 (H) 22 - 30 sec   D Dimer, Quantitative    Collection  Time: 07/13/21  9:48 PM   Result Value Ref Range    D Dimer, Quantitative 4.21 (H) <0.57 mg/L (FEU)   Rapid SARS-CoV-2 by PCR    Collection Time: 07/14/21 12:38 AM    Specimen: Nasopharyngeal; Swab   Result Value Ref Range    Rapid SARS-COV-2 by PCR Not Detected Not Detected / Detected / Presumptive Positive / Inhibitors present    Isolation Guidelines      Procedural Comment     Troponin I Ultra Sensitive    Collection Time: 07/14/21 12:43 AM   Result Value Ref Range    Troponin I, Ultra Sensitive <0.02 <=0.04 ng/mL   NT proBNP    Collection Time: 07/14/21 12:43 AM   Result Value Ref Range    NT-proBNP 294 (H) <=125 pg/mL   Thyroid Stimulating Hormone Reflex    Collection Time: 07/14/21  2:50 AM   Result Value Ref Range    TSH 2.058 0.350 - 5.000 mcUnits/mL   Ferritin    Collection Time: 07/14/21  2:50 AM   Result Value Ref Range    Ferritin 580 (H) 8 - 252 ng/mL   Iron And total Iron Binding Capacity    Collection Time: 07/14/21  2:50 AM   Result Value Ref Range    Iron 11 (L) 50 - 170 mcg/dL    Iron Binding Capacity 138 (L) 250 - 450 mcg/dL    Iron, Percent Saturation 8 (L) 15 - 45 %   Magnesium    Collection Time: 07/14/21  4:32 AM   Result Value Ref Range    Magnesium 2.0 1.7 - 2.4 mg/dL   Comprehensive Metabolic Panel    Collection Time: 07/14/21  4:32 AM   Result Value Ref Range    Fasting Status      Sodium 135 135 - 145 mmol/L    Potassium 4.1 3.4 - 5.1 mmol/L    Chloride 104 98 - 107 mmol/L    Carbon Dioxide 25 21 - 32 mmol/L    Anion Gap 10 10 - 20 mmol/L    Glucose 87 65 - 99 mg/dL    BUN 7 6 - 20 mg/dL    Creatinine 0.55 0.51 - 0.95 mg/dL    Glomerular Filtration Rate >90 >90 mL/min/1.73m2    BUN/ Creatinine Ratio 13 7 - 25    Calcium 8.6 8.4 - 10.2 mg/dL    Bilirubin, Total 0.3 0.2 - 1.0 mg/dL    GOT/AST 12 <=37 Units/L    GPT/ALT 13 <64 Units/L    Alkaline Phosphatase 96 45 - 117 Units/L    Albumin 2.1 (L) 3.6 - 5.1 g/dL    Protein, Total 7.4 6.4 - 8.2 g/dL    Globulin 5.3 (H) 2.0 - 4.0 g/dL    A/G  Ratio 0.4 (L) 1.0 - 2.4   C Reactive Protein    Collection Time: 07/14/21  4:32 AM   Result Value Ref Range    C-Reactive Protein 24.6 (H) <=1.0 mg/dL   CBC with Automated Differential (performable only)    Collection Time: 07/14/21  4:33 AM   Result Value Ref Range    WBC 8.3 4.2 - 11.0 K/mcL    RBC 3.53 (L) 4.00 - 5.20 mil/mcL    HGB 9.6 (L) 12.0 - 15.5 g/dL    HCT 29.4 (L) 36.0 - 46.5 %    MCV 83.3 78.0 - 100.0 fl    MCH 27.2 26.0 - 34.0 pg    MCHC 32.7 32.0 - 36.5 g/dL    RDW-CV 14.2 11.0 - 15.0 %    RDW-SD 43.8 39.0 - 50.0 fL     140 - 450 K/mcL    NRBC 0 <=0 /100 WBC    Neutrophil, Percent 69 %    Lymphocytes, Percent 21 %    Mono, Percent 10 %    Eosinophils, Percent 0 %    Basophils, Percent 0 %    Immature Granulocytes 0 %    Absolute Neutrophils 5.7 1.8 - 7.7 K/mcL    Absolute Lymphocytes 1.7 1.0 - 4.8 K/mcL    Absolute Monocytes 0.8 0.3 - 0.9 K/mcL    Absolute Eosinophils  0.0 0.0 - 0.5 K/mcL    Absolute Basophils 0.0 0.0 - 0.3 K/mcL    Absolute Immmature Granulocytes 0.0 0.0 - 0.2 K/mcL      ================================================================  ROS:   All systems review, all negative except what is stated in HPI.    No intake/output data recorded.  No intake/output data recorded.  Vital Last Value 24 Hour Range   Temperature 98.1 °F (36.7 °C) (07/14/21 0700) Temp  Min: 98.1 °F (36.7 °C)  Max: 99.1 °F (37.3 °C)   Pulse 92 (07/14/21 0700) Pulse  Min: 92  Max: 119   Respiratory 18 (07/14/21 0811) Resp  Min: 18  Max: 24   Non-Invasive  Blood Pressure 102/69 (07/14/21 0700) BP  Min: 102/69  Max: 131/81   Pulse Oximetry 99 % (07/14/21 0700) SpO2  Min: 98 %  Max: 100 %   Arterial   Blood Pressure   No data recorded     Physical Exam:  General: awake, alert and responsive and moderate obesity   Neuro: awake & alert, moves all; extremities, PERRL.  HEENT: Normal  Neck: supple  Lungs: decreased breath sounds B/L  Heart: RRR, no murmurs  Abdomen: soft, ND, NT, BS+  Extremities: unremarkable. Good  range of motion, edema  Skin: intact    Pertinent Reviewed: Allergies, Medications, Labs, Imaging, and Physician and Nursing Notes    ASSESSMENT    Dyspnea   CIARA   Obesity   H/O DVT     Plan    Recommend ABG   Echo   Sleep studies when discharge   PFTs   May benefit from low dose of lasix   May benefit from cardiology consult     Critical care services I provided 35 minutes.    Said MD Vasquez  7/14/2021

## 2021-07-26 ENCOUNTER — TELEPHONE (OUTPATIENT)
Dept: FAMILY MEDICINE CLINIC | Facility: CLINIC | Age: 61
End: 2021-07-26

## 2021-07-26 NOTE — TELEPHONE ENCOUNTER
Medication prescribed:  Telmisartan/hctz 80-12.5mg tabs    Message:  Is losartan being discontinued?

## 2021-07-27 ENCOUNTER — HOSPITAL ENCOUNTER (OUTPATIENT)
Dept: GENERAL RADIOLOGY | Facility: HOSPITAL | Age: 61
Discharge: HOME | End: 2021-07-27
Attending: PHYSICAL MEDICINE & REHABILITATION
Payer: COMMERCIAL

## 2021-07-27 ENCOUNTER — OFFICE VISIT (OUTPATIENT)
Dept: NEUROLOGY | Facility: CLINIC | Age: 61
End: 2021-07-27
Payer: COMMERCIAL

## 2021-07-27 ENCOUNTER — PATIENT MESSAGE (OUTPATIENT)
Dept: NEUROLOGY | Facility: CLINIC | Age: 61
End: 2021-07-27

## 2021-07-27 VITALS
WEIGHT: 225 LBS | HEART RATE: 97 BPM | SYSTOLIC BLOOD PRESSURE: 124 MMHG | DIASTOLIC BLOOD PRESSURE: 82 MMHG | OXYGEN SATURATION: 94 % | HEIGHT: 66 IN | BODY MASS INDEX: 36.16 KG/M2

## 2021-07-27 DIAGNOSIS — M25.551 RIGHT HIP PAIN: ICD-10-CM

## 2021-07-27 DIAGNOSIS — M25.551 RIGHT HIP PAIN: Primary | ICD-10-CM

## 2021-07-27 PROCEDURE — 99214 OFFICE O/P EST MOD 30 MIN: CPT | Performed by: PHYSICAL MEDICINE & REHABILITATION

## 2021-07-27 PROCEDURE — 3074F SYST BP LT 130 MM HG: CPT | Performed by: PHYSICAL MEDICINE & REHABILITATION

## 2021-07-27 PROCEDURE — 3079F DIAST BP 80-89 MM HG: CPT | Performed by: PHYSICAL MEDICINE & REHABILITATION

## 2021-07-27 PROCEDURE — 73503 X-RAY EXAM HIP UNI 4/> VIEWS: CPT | Performed by: PHYSICAL MEDICINE & REHABILITATION

## 2021-07-27 PROCEDURE — 3008F BODY MASS INDEX DOCD: CPT | Performed by: PHYSICAL MEDICINE & REHABILITATION

## 2021-07-27 PROCEDURE — 73502 X-RAY EXAM HIP UNI 2-3 VIEWS: CPT | Performed by: PHYSICAL MEDICINE & REHABILITATION

## 2021-07-27 RX ORDER — CELECOXIB 100 MG/1
CAPSULE ORAL
Qty: 60 CAPSULE | Refills: 0 | Status: SHIPPED | OUTPATIENT
Start: 2021-07-27 | End: 2021-07-28

## 2021-07-27 NOTE — TELEPHONE ENCOUNTER
Chart reviewed, losartan discontinued 7/23/21 by Dr Delmy Yoo on pharmacy voice mail, discontinue losartan fill telmisartan hydrochlorothiazide sent 7/23/21

## 2021-07-27 NOTE — TELEPHONE ENCOUNTER
From: Vazquez Meneses  To: Cindy Metz DO  Sent: 7/27/2021 2:15 PM CDT  Subject: Prescription Question    I have reached out to [facility]rian and they have not received the Rx request as of yet after my appt with you today.

## 2021-07-27 NOTE — PROGRESS NOTES
Progress note    C/C: right hip pain    HPI: 65-year-old female presents for follow-up with a new issue. She has been having increasing right posterior lateral hip pain for a little over 1 year without inciting event.   Symptoms do not radiate further into take this medication with food, and not taking the other anti-inflammatories while taking this medication. Okay to continue using a stationary bike most days of the week at 20-minute intervals unless directed otherwise.  Might benefit from hip joint inject

## 2021-07-28 ENCOUNTER — TELEPHONE (OUTPATIENT)
Dept: NEUROLOGY | Facility: CLINIC | Age: 61
End: 2021-07-28

## 2021-07-28 DIAGNOSIS — M25.551 RIGHT HIP PAIN: Primary | ICD-10-CM

## 2021-07-28 RX ORDER — DICLOFENAC SODIUM 75 MG/1
TABLET, DELAYED RELEASE ORAL
Qty: 40 TABLET | Refills: 0 | Status: SHIPPED | OUTPATIENT
Start: 2021-07-28 | End: 2021-09-29

## 2021-07-28 NOTE — TELEPHONE ENCOUNTER
Patient's pharmacy state the celecoxib 100 mg Oral Ca is not covered. No suggestion medication when called.

## 2021-07-30 ENCOUNTER — PATIENT MESSAGE (OUTPATIENT)
Dept: NEUROLOGY | Facility: CLINIC | Age: 61
End: 2021-07-30

## 2021-07-30 ENCOUNTER — TELEPHONE (OUTPATIENT)
Dept: NEUROLOGY | Facility: CLINIC | Age: 61
End: 2021-07-30

## 2021-07-30 NOTE — TELEPHONE ENCOUNTER
From: Juaquin Olivo  To:  Brayan Rothman DO  Sent: 7/30/2021 11:50 AM CDT  Subject: Other    Good morning,    I was wondering if you received my request for a temporary doctor note stating to my employer that I may need limited hours of working in the of

## 2021-07-30 NOTE — TELEPHONE ENCOUNTER
----- Message from Kodi Rahman sent at 7/30/2021  7:08 AM CDT -----    ----- Message -----  From: Marty Price DO  Sent: 7/29/2021   9:14 PM CDT  To: Vanessa Powell Nurse    Please let the patient know she has quite a bit of osteoarthritis in the righ

## 2021-08-02 NOTE — TELEPHONE ENCOUNTER
Dr Sapphire Contreras sent a [employer] message to patient stating that it is sent through [address] [place] [address] [address].

## 2021-08-12 ENCOUNTER — OFFICE VISIT (OUTPATIENT)
Dept: PODIATRY CLINIC | Facility: CLINIC | Age: 61
End: 2021-08-12
Payer: COMMERCIAL

## 2021-08-12 DIAGNOSIS — B35.1 ONYCHOMYCOSIS: Primary | ICD-10-CM

## 2021-08-12 DIAGNOSIS — M79.675 PAIN IN TOES OF BOTH FEET: ICD-10-CM

## 2021-08-12 DIAGNOSIS — M79.674 PAIN IN TOES OF BOTH FEET: ICD-10-CM

## 2021-08-12 PROCEDURE — 11721 DEBRIDE NAIL 6 OR MORE: CPT | Performed by: PODIATRIST

## 2021-08-24 ENCOUNTER — PATIENT MESSAGE (OUTPATIENT)
Dept: NEUROLOGY | Facility: CLINIC | Age: 61
End: 2021-08-24

## 2021-08-24 DIAGNOSIS — M16.11 PRIMARY OSTEOARTHRITIS OF RIGHT HIP: ICD-10-CM

## 2021-08-24 DIAGNOSIS — M25.551 RIGHT HIP PAIN: Primary | ICD-10-CM

## 2021-08-24 NOTE — TELEPHONE ENCOUNTER
From: Pauline Bobby  To: Sravanthi Gonsales DO  Sent: 8/24/2021 3:20 PM CDT  Subject: Visit Kalpana Hercules,  The Rx you prescribed, really has not helped with the pain on my right hip, I do till have a few pills left to take.   What

## 2021-08-27 NOTE — TELEPHONE ENCOUNTER
Patient calling stating diclofenac does not help her 5-9/10 right hip pain radiating into upper thigh. Patient asking if Dr. Eloina Soler could recommend a different medication or what the next step is. Please advise. Can respond through [address]

## 2021-08-30 ENCOUNTER — TELEPHONE (OUTPATIENT)
Dept: NEUROLOGY | Facility: CLINIC | Age: 61
End: 2021-08-30

## 2021-08-30 NOTE — TELEPHONE ENCOUNTER
Zachariah Kelly at [facility] Case/Reference # [id number] to initiate authorization for Right hip joint steroid injection under fluoroscopy CPT 55390+29741 dx:M16.11 to be done at [facility].   Coverage is active  [facility] requested clinicals-clinicals faxed to [phone]

## 2021-09-08 NOTE — TELEPHONE ENCOUNTER
Contacted Laura Colindres at [facility] who states Right hip joint steroid injection under fluoroscopy has been Approved with authorization #[id number] valid 8/30/21-11/30/21    Routing to clinical staff to schedule

## 2021-09-09 ENCOUNTER — TELEPHONE (OUTPATIENT)
Dept: PODIATRY CLINIC | Facility: CLINIC | Age: 61
End: 2021-09-09

## 2021-09-09 NOTE — TELEPHONE ENCOUNTER
----- Message from Santa Garcia DPM sent at 9/9/2021  8:50 AM CDT -----  Please call this patient and notify them of the need for an office visit to the review fungus culture results thank you

## 2021-09-09 NOTE — TELEPHONE ENCOUNTER
LMTCB on pt's preferred # informing that David Rodriguez got her culture results and would like for her to schedule an appt with him to discuss results and Tx plan. -- When pt calls back, please schedule her an appt with Rachel. Thank you.

## 2021-09-13 ENCOUNTER — PATIENT MESSAGE (OUTPATIENT)
Dept: NEUROLOGY | Facility: CLINIC | Age: 61
End: 2021-09-13

## 2021-09-14 NOTE — TELEPHONE ENCOUNTER
From: Paulo Murphy  To:  Sara Ramirez DO  Sent: 9/13/2021 3:08 PM CDT  Subject: Out Patient Procedure Confirmation    Good afternoon,    I just spoke to [facility] care re: the approval of the Hip injection procedure and the person told me it was approve

## 2021-09-15 NOTE — TELEPHONE ENCOUNTER
Patient has been scheduled for a Right hip joint steroid injection under fluoroscopy, [facility]. Local.  on 09/22/21 at the [facility]. Medications and allergies reviewed.  Patient informed we will need verbal or written authorization from patients Primary Care Physic

## 2021-09-29 ENCOUNTER — TELEPHONE (OUTPATIENT)
Dept: PHYSICAL MEDICINE AND REHAB | Facility: CLINIC | Age: 61
End: 2021-09-29

## 2021-09-29 ENCOUNTER — OFFICE VISIT (OUTPATIENT)
Dept: SURGERY | Facility: CLINIC | Age: 61
End: 2021-09-29

## 2021-09-29 DIAGNOSIS — M16.11 PRIMARY OSTEOARTHRITIS OF RIGHT HIP: Primary | ICD-10-CM

## 2021-09-29 DIAGNOSIS — M25.551 RIGHT HIP PAIN: ICD-10-CM

## 2021-09-29 PROCEDURE — 77002 NEEDLE LOCALIZATION BY XRAY: CPT | Performed by: PHYSICAL MEDICINE & REHABILITATION

## 2021-09-29 PROCEDURE — 20610 DRAIN/INJ JOINT/BURSA W/O US: CPT | Performed by: PHYSICAL MEDICINE & REHABILITATION

## 2021-09-29 RX ORDER — DICLOFENAC SODIUM 75 MG/1
75 TABLET, DELAYED RELEASE ORAL 2 TIMES DAILY PRN
Qty: 40 TABLET | Refills: 0 | Status: SHIPPED | OUTPATIENT
Start: 2021-09-29 | End: 2021-12-06

## 2021-09-29 NOTE — TELEPHONE ENCOUNTER
Spoke with patient who understood and stated she does not have any more diclofenac left so will need a refill. Patient also advised to call our office back in a few days if there is no improvement. Patient understood and was thankful.       Medication reque

## 2021-09-29 NOTE — TELEPHONE ENCOUNTER
Noted. Agree with your recommendations; may take diclofenac tomorrow morning, tylenol for the rest of the day today.

## 2021-09-29 NOTE — TELEPHONE ENCOUNTER
Spoke with patient who stated she just had an injection today and feels worse. Patient had Right hip joint steroid injection under fluoroscopy today 9/29/21. States pain is worse now and it is hard to walk due to pain.  Patient not currently taking anyth

## 2021-09-29 NOTE — PROCEDURES
Right intra-articular hip injection    Indication: right hip osteoarthritis    Description of procedure: After discussion of the risks and benefits of the procedure, informed consent was signed and the patient was marked to confirm the correct hip to be in

## 2021-10-01 NOTE — TELEPHONE ENCOUNTER
Pt calling to leave another condition update. Still experiencing pain so she called off of work today.     She is requesting a work note stating that she had the procedure done and took another day off of work (10/1) due to pain    Please advise

## 2021-10-08 ENCOUNTER — OFFICE VISIT (OUTPATIENT)
Dept: PODIATRY CLINIC | Facility: CLINIC | Age: 61
End: 2021-10-08
Payer: COMMERCIAL

## 2021-10-08 DIAGNOSIS — B35.1 ONYCHOMYCOSIS: Primary | ICD-10-CM

## 2021-10-08 PROCEDURE — 99213 OFFICE O/P EST LOW 20 MIN: CPT | Performed by: PODIATRIST

## 2021-10-08 NOTE — PROGRESS NOTES
HPI:    Patient ID: Lex Metzger is a 63year old female. This 72-year-old female presents to discuss options of care in reference to her nail changes. Nails remain consistent with mycosis.   Based on the degree of frustrations and concerns she is hav

## 2021-10-25 DIAGNOSIS — I10 ESSENTIAL HYPERTENSION: ICD-10-CM

## 2021-10-26 NOTE — TELEPHONE ENCOUNTER
Please review. Protocol failed / No protocol. Requested Prescriptions   Pending Prescriptions Disp Refills    TELMISARTAN-HCTZ 80-12.5 MG Oral Tab [Pharmacy Med Name: TELMISARTAN/HCTZ 80-12.5MG TABS] 90 tablet 0     Sig: Take 1 tablet by mouth daily. Hypertensive Medications Protocol Failed - 10/25/2021  7:19 PM        Failed - CMP or BMP in past 12 months        Passed - Appointment in past 6 or next 3 months        Passed - GFR Non- > 50     Lab Results   Component Value Date    GFRNAA 76 02/23/2019                      Future Appointments         Provider Department Appt Notes    In 2 days Rossana Wilson, [facility], [place]-Physiatry injecton f/u           Recent Outpatient Visits              2 weeks ago Onychomycosis    [facility], [address] [facility],[address], [address] [place] [address], [place], [place]    Office Visit    3 weeks ago Primary osteoarthritis of right hip    [facility] Rossana Wilosn, DO    Office Visit    2 months ago Onychomycosis    [facility], [facility].  [address], [place], [place]    Office Visit    3 months ago Right hip pain    CLAUDE Wilson, DO    Office Visit    4 months ago Essential hypertension, benign    [facility], [facility], [address], [address], [place], [place]    Office Visit

## 2021-10-28 ENCOUNTER — OFFICE VISIT (OUTPATIENT)
Dept: PHYSICAL MEDICINE AND REHAB | Facility: CLINIC | Age: 61
End: 2021-10-28
Payer: COMMERCIAL

## 2021-10-28 ENCOUNTER — TELEPHONE (OUTPATIENT)
Dept: PHYSICAL MEDICINE AND REHAB | Facility: CLINIC | Age: 61
End: 2021-10-28

## 2021-10-28 VITALS
DIASTOLIC BLOOD PRESSURE: 84 MMHG | BODY MASS INDEX: 36.16 KG/M2 | OXYGEN SATURATION: 97 % | HEIGHT: 66 IN | SYSTOLIC BLOOD PRESSURE: 132 MMHG | WEIGHT: 225 LBS | HEART RATE: 82 BPM

## 2021-10-28 DIAGNOSIS — M16.11 PRIMARY OSTEOARTHRITIS OF RIGHT HIP: ICD-10-CM

## 2021-10-28 DIAGNOSIS — I10 ESSENTIAL HYPERTENSION: ICD-10-CM

## 2021-10-28 DIAGNOSIS — M25.551 RIGHT HIP PAIN: Primary | ICD-10-CM

## 2021-10-28 PROCEDURE — 3079F DIAST BP 80-89 MM HG: CPT | Performed by: PHYSICAL MEDICINE & REHABILITATION

## 2021-10-28 PROCEDURE — 3008F BODY MASS INDEX DOCD: CPT | Performed by: PHYSICAL MEDICINE & REHABILITATION

## 2021-10-28 PROCEDURE — 3075F SYST BP GE 130 - 139MM HG: CPT | Performed by: PHYSICAL MEDICINE & REHABILITATION

## 2021-10-28 PROCEDURE — 99214 OFFICE O/P EST MOD 30 MIN: CPT | Performed by: PHYSICAL MEDICINE & REHABILITATION

## 2021-10-28 RX ORDER — DICLOFENAC SODIUM 75 MG/1
TABLET, DELAYED RELEASE ORAL
Qty: 30 TABLET | Refills: 0 | Status: SHIPPED | OUTPATIENT
Start: 2021-10-28 | End: 2021-12-06

## 2021-10-28 NOTE — PROGRESS NOTES
Progress note    C/C: right hip pain    HPI: 51-year-old female presents for follow-up. She had about 4 days relief following right hip joint steroid injection under fluoroscopy on 9/29/21 and returns for follow-up.   She continues to have right groin pain

## 2021-10-28 NOTE — TELEPHONE ENCOUNTER
Patient states she is out of medication. Patient states since starting medication blood pressure has significantly improved. Patient asking for refill to be sent to pharmacy.

## 2021-10-28 NOTE — PATIENT INSTRUCTIONS
We will call to schedule once we have insurance approval. We should aim for the 2nd or 3rd week of November.

## 2021-10-28 NOTE — TELEPHONE ENCOUNTER
Please review. Protocol failed / No protocol. Requested Prescriptions   Pending Prescriptions Disp Refills    Telmisartan-HCTZ (MICARDIS HCT) 80-12.5 MG Oral Tab 90 tablet 0     Sig: Take 1 tablet by mouth daily.         Hypertensive Medications Protoco

## 2021-10-28 NOTE — TELEPHONE ENCOUNTER
Initiated authorization for Right hip joint steroid injection under fluoroscopy CPT 40867+20701+ to be done at [facility]  .   Coverage is active    Status: pending follow up

## 2021-10-29 NOTE — TELEPHONE ENCOUNTER
Spoke to Celsa DAVIES at [facility] and initiated authorization for Right hip joint steroid injection under fluoroscopy.   [facility] request clinicals-clinicals faxed to [phone]   Status:pending reference #[id number]

## 2021-10-30 RX ORDER — TELMISARTAN AND HYDROCHLORTHIAZIDE 80; 12.5 MG/1; MG/1
1 TABLET ORAL DAILY
Qty: 90 TABLET | Refills: 1 | Status: SHIPPED | OUTPATIENT
Start: 2021-10-30

## 2021-10-31 RX ORDER — TELMISARTAN AND HYDROCHLORTHIAZIDE 80; 12.5 MG/1; MG/1
1 TABLET ORAL DAILY
Qty: 90 TABLET | Refills: 0 | Status: SHIPPED | OUTPATIENT
Start: 2021-10-31 | End: 2022-05-20

## 2021-10-31 NOTE — TELEPHONE ENCOUNTER
Refill times 1 but needs appt for next script. Please call patient and explain that patient needs to be seen for blood pressure visit as last blood pressure was terribly high and this is a new medication now.

## 2021-11-02 NOTE — TELEPHONE ENCOUNTER
Spoke, with the patient and informed her of the message below. Pt states that she will schedule an appointment on her my chart.

## 2021-11-05 ENCOUNTER — PATIENT MESSAGE (OUTPATIENT)
Dept: PHYSICAL MEDICINE AND REHAB | Facility: CLINIC | Age: 61
End: 2021-11-05

## 2021-11-05 NOTE — TELEPHONE ENCOUNTER
From: Jigna Tolbert  To: Juanita Isaacs DO  Sent: 11/5/2021 4:07 PM CDT  Subject: Out Patient Injection Approval status    Can you let me know if the approval was completed for the Right Hip Injection that Dr. Eloina Soler recommended?     Eunice Ron

## 2021-11-10 NOTE — TELEPHONE ENCOUNTER
Patient has been scheduled for a Right hip joint steroid injection under fluoroscopy on 12/6/21 at the [facility]. Medications and allergies reviewed.  Patient informed to hold aspirins, nsaids, blood thinners, multivitamins, phentermine, vitamin E and fish oil

## 2021-12-06 ENCOUNTER — OFFICE VISIT (OUTPATIENT)
Dept: SURGERY | Facility: CLINIC | Age: 61
End: 2021-12-06

## 2021-12-06 DIAGNOSIS — M25.551 RIGHT HIP PAIN: ICD-10-CM

## 2021-12-06 PROCEDURE — 20610 DRAIN/INJ JOINT/BURSA W/O US: CPT | Performed by: PHYSICAL MEDICINE & REHABILITATION

## 2021-12-06 PROCEDURE — 77002 NEEDLE LOCALIZATION BY XRAY: CPT | Performed by: PHYSICAL MEDICINE & REHABILITATION

## 2021-12-06 RX ORDER — DICLOFENAC SODIUM 75 MG/1
75 TABLET, DELAYED RELEASE ORAL 2 TIMES DAILY PRN
Qty: 40 TABLET | Refills: 0 | Status: SHIPPED | OUTPATIENT
Start: 2021-12-06

## 2021-12-10 ENCOUNTER — OFFICE VISIT (OUTPATIENT)
Dept: PODIATRY CLINIC | Facility: CLINIC | Age: 61
End: 2021-12-10
Payer: COMMERCIAL

## 2021-12-10 DIAGNOSIS — M79.674 PAIN IN TOES OF BOTH FEET: Primary | ICD-10-CM

## 2021-12-10 DIAGNOSIS — B35.1 ONYCHOMYCOSIS: ICD-10-CM

## 2021-12-10 DIAGNOSIS — M79.675 PAIN IN TOES OF BOTH FEET: Primary | ICD-10-CM

## 2021-12-10 PROCEDURE — 11721 DEBRIDE NAIL 6 OR MORE: CPT | Performed by: PODIATRIST

## 2021-12-10 NOTE — PROGRESS NOTES
HPI:    Patient ID: Melonie Mirza is a 63year old female. This 44-year-old female presents with recurrent symptoms associated with her toenails.   I saw her about 2 months ago and we recommended the use of an oral antifungal.  She still has not had t

## 2022-03-04 ENCOUNTER — OFFICE VISIT (OUTPATIENT)
Dept: PODIATRY CLINIC | Facility: CLINIC | Age: 62
End: 2022-03-04
Payer: COMMERCIAL

## 2022-03-04 DIAGNOSIS — M79.674 PAIN IN TOES OF BOTH FEET: Primary | ICD-10-CM

## 2022-03-04 DIAGNOSIS — M79.675 PAIN IN TOES OF BOTH FEET: Primary | ICD-10-CM

## 2022-03-04 DIAGNOSIS — B35.1 ONYCHOMYCOSIS: ICD-10-CM

## 2022-03-04 PROCEDURE — 11721 DEBRIDE NAIL 6 OR MORE: CPT | Performed by: PODIATRIST

## 2022-05-20 ENCOUNTER — OFFICE VISIT (OUTPATIENT)
Dept: PODIATRY CLINIC | Facility: CLINIC | Age: 62
End: 2022-05-20
Payer: COMMERCIAL

## 2022-05-20 ENCOUNTER — OFFICE VISIT (OUTPATIENT)
Dept: FAMILY MEDICINE CLINIC | Facility: CLINIC | Age: 62
End: 2022-05-20
Payer: COMMERCIAL

## 2022-05-20 ENCOUNTER — TELEPHONE (OUTPATIENT)
Dept: FAMILY MEDICINE CLINIC | Facility: CLINIC | Age: 62
End: 2022-05-20

## 2022-05-20 VITALS
DIASTOLIC BLOOD PRESSURE: 83 MMHG | HEART RATE: 86 BPM | SYSTOLIC BLOOD PRESSURE: 128 MMHG | HEIGHT: 66 IN | BODY MASS INDEX: 36 KG/M2 | TEMPERATURE: 97 F

## 2022-05-20 DIAGNOSIS — M79.674 PAIN IN TOES OF BOTH FEET: Primary | ICD-10-CM

## 2022-05-20 DIAGNOSIS — M79.675 PAIN IN TOES OF BOTH FEET: Primary | ICD-10-CM

## 2022-05-20 DIAGNOSIS — E66.01 SEVERE OBESITY (BMI 35.0-35.9 WITH COMORBIDITY) (HCC): ICD-10-CM

## 2022-05-20 DIAGNOSIS — B35.1 ONYCHOMYCOSIS: ICD-10-CM

## 2022-05-20 DIAGNOSIS — Z00.00 ADULT GENERAL MEDICAL EXAM: Primary | ICD-10-CM

## 2022-05-20 DIAGNOSIS — I10 ESSENTIAL HYPERTENSION, BENIGN: ICD-10-CM

## 2022-05-20 DIAGNOSIS — I10 ESSENTIAL HYPERTENSION: ICD-10-CM

## 2022-05-20 DIAGNOSIS — Z12.31 VISIT FOR SCREENING MAMMOGRAM: ICD-10-CM

## 2022-05-20 DIAGNOSIS — Z96.642 HISTORY OF TOTAL LEFT HIP REPLACEMENT: ICD-10-CM

## 2022-05-20 DIAGNOSIS — Z12.4 CERVICAL CANCER SCREENING: ICD-10-CM

## 2022-05-20 DIAGNOSIS — Z12.11 SCREENING FOR COLON CANCER: ICD-10-CM

## 2022-05-20 DIAGNOSIS — M48.07 SPINAL STENOSIS OF LUMBOSACRAL REGION: ICD-10-CM

## 2022-05-20 DIAGNOSIS — M16.10 HIP ARTHRITIS: ICD-10-CM

## 2022-05-20 DIAGNOSIS — E78.2 MIXED HYPERLIPIDEMIA: ICD-10-CM

## 2022-05-20 PROCEDURE — 3074F SYST BP LT 130 MM HG: CPT | Performed by: FAMILY MEDICINE

## 2022-05-20 PROCEDURE — 99396 PREV VISIT EST AGE 40-64: CPT | Performed by: FAMILY MEDICINE

## 2022-05-20 PROCEDURE — 99213 OFFICE O/P EST LOW 20 MIN: CPT | Performed by: FAMILY MEDICINE

## 2022-05-20 PROCEDURE — 3079F DIAST BP 80-89 MM HG: CPT | Performed by: FAMILY MEDICINE

## 2022-05-20 RX ORDER — CELECOXIB 200 MG/1
200 CAPSULE ORAL DAILY
Qty: 90 CAPSULE | Refills: 0 | Status: SHIPPED | OUTPATIENT
Start: 2022-05-20

## 2022-05-20 RX ORDER — TELMISARTAN AND HYDROCHLORTHIAZIDE 80; 12.5 MG/1; MG/1
1 TABLET ORAL DAILY
Qty: 90 TABLET | Refills: 3 | Status: SHIPPED | OUTPATIENT
Start: 2022-05-20

## 2022-07-13 ENCOUNTER — OFFICE VISIT (OUTPATIENT)
Dept: PODIATRY CLINIC | Facility: CLINIC | Age: 62
End: 2022-07-13
Payer: COMMERCIAL

## 2022-07-13 DIAGNOSIS — B35.1 ONYCHOMYCOSIS: ICD-10-CM

## 2022-07-13 DIAGNOSIS — M79.675 PAIN IN TOES OF BOTH FEET: Primary | ICD-10-CM

## 2022-07-13 DIAGNOSIS — M79.674 PAIN IN TOES OF BOTH FEET: Primary | ICD-10-CM

## 2022-07-13 PROCEDURE — 11721 DEBRIDE NAIL 6 OR MORE: CPT | Performed by: PODIATRIST

## 2022-07-25 ENCOUNTER — HOSPITAL ENCOUNTER (OUTPATIENT)
Age: 62
Discharge: HOME | End: 2022-07-25
Payer: COMMERCIAL

## 2022-07-25 ENCOUNTER — NURSE TRIAGE (OUTPATIENT)
Dept: FAMILY MEDICINE CLINIC | Facility: CLINIC | Age: 62
End: 2022-07-25

## 2022-07-25 VITALS
TEMPERATURE: 98 F | HEIGHT: 67 IN | HEART RATE: 93 BPM | WEIGHT: 195 LBS | OXYGEN SATURATION: 98 % | BODY MASS INDEX: 30.61 KG/M2 | DIASTOLIC BLOOD PRESSURE: 91 MMHG | RESPIRATION RATE: 18 BRPM | SYSTOLIC BLOOD PRESSURE: 125 MMHG

## 2022-07-25 DIAGNOSIS — U07.1 COVID-19: Primary | ICD-10-CM

## 2022-07-25 LAB — SARS-COV-2 RNA RESP QL NAA+PROBE: DETECTED

## 2022-07-25 PROCEDURE — 99203 OFFICE O/P NEW LOW 30 MIN: CPT | Performed by: NURSE PRACTITIONER

## 2022-07-25 PROCEDURE — U0002 COVID-19 LAB TEST NON-CDC: HCPCS | Performed by: NURSE PRACTITIONER

## 2022-07-25 RX ORDER — NIRMATRELVIR AND RITONAVIR 300-100 MG
KIT ORAL
Qty: 30 TABLET | Refills: 0 | Status: SHIPPED | OUTPATIENT
Start: 2022-07-25 | End: 2022-07-30

## 2022-07-25 NOTE — ED INITIAL ASSESSMENT (HPI)
Pt presents to the IC with c/o cough, body aches beginning Saturday. Positive at home covid today, wishes to confirm results.

## 2022-09-16 ENCOUNTER — TELEPHONE (OUTPATIENT)
Dept: FAMILY MEDICINE CLINIC | Facility: CLINIC | Age: 62
End: 2022-09-16

## 2022-09-16 NOTE — TELEPHONE ENCOUNTER
Pt scheduled for right hip replacement Oct. 28th, 2022 at [facility]. Currently has surgeons' lab orders to complete. Advised pt to have results faxed to Dr Alysha Reynolds to review at preop clearance visit 9/13/22. Fax [phone] given to pt. Pt verbalized understanding and agreed with plan.     Please reply to pool: NERI Merrill Page

## 2022-09-23 ENCOUNTER — OFFICE VISIT (OUTPATIENT)
Dept: PODIATRY CLINIC | Facility: CLINIC | Age: 62
End: 2022-09-23

## 2022-09-23 DIAGNOSIS — B35.1 ONYCHOMYCOSIS: ICD-10-CM

## 2022-09-23 DIAGNOSIS — M79.674 PAIN IN TOES OF BOTH FEET: Primary | ICD-10-CM

## 2022-09-23 DIAGNOSIS — M79.675 PAIN IN TOES OF BOTH FEET: Primary | ICD-10-CM

## 2022-10-10 ENCOUNTER — OFFICE VISIT (OUTPATIENT)
Dept: FAMILY MEDICINE CLINIC | Facility: CLINIC | Age: 62
End: 2022-10-10
Payer: COMMERCIAL

## 2022-10-10 VITALS
BODY MASS INDEX: 35.63 KG/M2 | HEIGHT: 67 IN | WEIGHT: 227 LBS | TEMPERATURE: 98 F | SYSTOLIC BLOOD PRESSURE: 128 MMHG | DIASTOLIC BLOOD PRESSURE: 84 MMHG | HEART RATE: 98 BPM

## 2022-10-10 DIAGNOSIS — G89.29 CHRONIC RIGHT HIP PAIN: ICD-10-CM

## 2022-10-10 DIAGNOSIS — I10 ESSENTIAL HYPERTENSION, BENIGN: ICD-10-CM

## 2022-10-10 DIAGNOSIS — E55.9 VITAMIN D DEFICIENCY: ICD-10-CM

## 2022-10-10 DIAGNOSIS — M25.551 CHRONIC RIGHT HIP PAIN: ICD-10-CM

## 2022-10-10 DIAGNOSIS — M16.11 PRIMARY OSTEOARTHRITIS OF RIGHT HIP: Primary | ICD-10-CM

## 2022-10-12 ENCOUNTER — TELEPHONE (OUTPATIENT)
Dept: FAMILY MEDICINE CLINIC | Facility: CLINIC | Age: 62
End: 2022-10-12

## 2022-10-12 NOTE — TELEPHONE ENCOUNTER
Jeffrey Wick from Dr. Adela Self office calling to request  medical clearance to be faxed: [phone]  Surgery is Oct 28th.      [address]ina [address]: [phone]

## 2022-10-13 NOTE — TELEPHONE ENCOUNTER
I called matt to number below to check if clearance was received as we received a fax confirmation. I was unable to speak with someone and was only able to leave a VM. Please ask if clearance was received for patient below.  Thank you

## 2022-11-03 ENCOUNTER — TELEPHONE (OUTPATIENT)
Dept: FAMILY MEDICINE CLINIC | Facility: CLINIC | Age: 62
End: 2022-11-03

## 2022-11-03 NOTE — TELEPHONE ENCOUNTER
Alie with home healthcare called to report patient has had lower blood pressure readings the past few days:    100/80 and 98/80     Patient takes Telmisartan -hydrochlorothiazide 80-12.5mg daily. She would like to know if you would like patient to hold medication. No signs/symptoms of light headedness/dizziness reported.

## 2022-11-03 NOTE — TELEPHONE ENCOUNTER
Please hold the blood pressure medication and do daily checks of the blood pressure or every other day of the blood pressure to see if it stays low or if it starts going up again.

## 2022-12-06 ENCOUNTER — LAB ENCOUNTER (OUTPATIENT)
Dept: LAB | Age: 62
End: 2022-12-06
Attending: FAMILY MEDICINE
Payer: COMMERCIAL

## 2022-12-06 DIAGNOSIS — E55.9 VITAMIN D DEFICIENCY: ICD-10-CM

## 2022-12-06 LAB — VIT D+METAB SERPL-MCNC: 36.1 NG/ML (ref 30–100)

## 2022-12-06 PROCEDURE — 82306 VITAMIN D 25 HYDROXY: CPT

## 2022-12-06 PROCEDURE — 36415 COLL VENOUS BLD VENIPUNCTURE: CPT

## 2022-12-08 ENCOUNTER — OFFICE VISIT (OUTPATIENT)
Dept: FAMILY MEDICINE CLINIC | Facility: CLINIC | Age: 62
End: 2022-12-08
Payer: COMMERCIAL

## 2022-12-08 ENCOUNTER — OFFICE VISIT (OUTPATIENT)
Dept: PODIATRY CLINIC | Facility: CLINIC | Age: 62
End: 2022-12-08
Payer: COMMERCIAL

## 2022-12-08 VITALS
HEART RATE: 101 BPM | TEMPERATURE: 98 F | DIASTOLIC BLOOD PRESSURE: 76 MMHG | WEIGHT: 232.38 LBS | HEIGHT: 67 IN | SYSTOLIC BLOOD PRESSURE: 106 MMHG | BODY MASS INDEX: 36.47 KG/M2

## 2022-12-08 DIAGNOSIS — Z86.79 HISTORY OF HYPERTENSION: ICD-10-CM

## 2022-12-08 DIAGNOSIS — Z12.4 CERVICAL CANCER SCREENING: ICD-10-CM

## 2022-12-08 DIAGNOSIS — Z12.31 VISIT FOR SCREENING MAMMOGRAM: ICD-10-CM

## 2022-12-08 DIAGNOSIS — M79.675 PAIN IN TOES OF BOTH FEET: Primary | ICD-10-CM

## 2022-12-08 DIAGNOSIS — M79.674 PAIN IN TOES OF BOTH FEET: Primary | ICD-10-CM

## 2022-12-08 DIAGNOSIS — E55.9 VITAMIN D DEFICIENCY: Primary | ICD-10-CM

## 2022-12-08 DIAGNOSIS — Z96.641 HISTORY OF RIGHT HIP REPLACEMENT: ICD-10-CM

## 2022-12-08 DIAGNOSIS — B35.1 ONYCHOMYCOSIS: ICD-10-CM

## 2022-12-08 DIAGNOSIS — Z12.11 SCREENING FOR COLON CANCER: ICD-10-CM

## 2022-12-08 PROCEDURE — 3074F SYST BP LT 130 MM HG: CPT | Performed by: FAMILY MEDICINE

## 2022-12-08 PROCEDURE — 3078F DIAST BP <80 MM HG: CPT | Performed by: FAMILY MEDICINE

## 2022-12-08 PROCEDURE — 11721 DEBRIDE NAIL 6 OR MORE: CPT | Performed by: PODIATRIST

## 2022-12-08 PROCEDURE — 3008F BODY MASS INDEX DOCD: CPT | Performed by: FAMILY MEDICINE

## 2022-12-08 PROCEDURE — 99214 OFFICE O/P EST MOD 30 MIN: CPT | Performed by: FAMILY MEDICINE

## 2022-12-08 RX ORDER — CHOLECALCIFEROL (VITAMIN D3) 1250 MCG
1 CAPSULE ORAL WEEKLY
Qty: 12 CAPSULE | Refills: 1 | Status: SHIPPED | OUTPATIENT
Start: 2022-12-08

## 2022-12-08 RX ORDER — CHOLECALCIFEROL (VITAMIN D3) 1250 MCG
1 CAPSULE ORAL WEEKLY
COMMUNITY
Start: 2022-10-11 | End: 2022-12-08

## 2022-12-08 RX ORDER — ASPIRIN 81 MG/1
TABLET ORAL
COMMUNITY
Start: 2022-10-19

## 2022-12-08 RX ORDER — FAMOTIDINE 20 MG/1
TABLET, FILM COATED ORAL
COMMUNITY
Start: 2022-10-23

## 2022-12-08 RX ORDER — LIDOCAINE HYDROCHLORIDE 20 MG/ML
SOLUTION ORAL; TOPICAL
COMMUNITY
Start: 2022-10-24

## 2022-12-08 NOTE — PATIENT INSTRUCTIONS
Find your blood pressure cuff and start checking your blood pressures without taking the medications and after you get about 10 days worth of numbers let me know by Lorena. Ask your pharmacist about the flu shot, COVID shot, and the Shingrix which is the shingles vaccine.

## 2023-02-08 ENCOUNTER — OFFICE VISIT (OUTPATIENT)
Dept: PODIATRY CLINIC | Facility: CLINIC | Age: 63
End: 2023-02-08

## 2023-02-08 DIAGNOSIS — L60.0 INGROWN TOENAIL OF RIGHT FOOT: ICD-10-CM

## 2023-02-08 DIAGNOSIS — B35.1 ONYCHOMYCOSIS: Primary | ICD-10-CM

## 2023-02-08 DIAGNOSIS — R26.81 GAIT INSTABILITY: ICD-10-CM

## 2023-02-08 PROCEDURE — 99213 OFFICE O/P EST LOW 20 MIN: CPT | Performed by: PODIATRIST

## 2023-04-26 ENCOUNTER — OFFICE VISIT (OUTPATIENT)
Dept: PODIATRY CLINIC | Facility: CLINIC | Age: 63
End: 2023-04-26

## 2023-04-26 DIAGNOSIS — B35.1 ONYCHOMYCOSIS: Primary | ICD-10-CM

## 2023-04-26 DIAGNOSIS — L60.0 INGROWN TOENAIL OF RIGHT FOOT: ICD-10-CM

## 2023-04-26 DIAGNOSIS — R26.81 GAIT INSTABILITY: ICD-10-CM

## 2023-04-26 PROCEDURE — 99213 OFFICE O/P EST LOW 20 MIN: CPT | Performed by: PODIATRIST

## 2023-05-09 ENCOUNTER — TELEPHONE (OUTPATIENT)
Dept: FAMILY MEDICINE CLINIC | Facility: CLINIC | Age: 63
End: 2023-05-09

## 2023-07-12 ENCOUNTER — OFFICE VISIT (OUTPATIENT)
Dept: PODIATRY CLINIC | Facility: CLINIC | Age: 63
End: 2023-07-12

## 2023-07-12 DIAGNOSIS — R26.81 GAIT INSTABILITY: ICD-10-CM

## 2023-07-12 DIAGNOSIS — B35.1 ONYCHOMYCOSIS: Primary | ICD-10-CM

## 2023-07-12 DIAGNOSIS — L60.0 INGROWN TOENAIL OF RIGHT FOOT: ICD-10-CM

## 2023-07-12 PROCEDURE — 99213 OFFICE O/P EST LOW 20 MIN: CPT | Performed by: PODIATRIST

## 2023-07-12 RX ORDER — TELMISARTAN AND HYDROCHLORTHIAZIDE 80; 12.5 MG/1; MG/1
1 TABLET ORAL DAILY
COMMUNITY
Start: 2023-05-01

## 2023-07-12 NOTE — PROGRESS NOTES
[facility], [facility] Podiatry  Progress Note    Sparkle Diaz is a 66year old female. Patient presents with:  Toenail Care: Pt in for toenail care, denies any pain today        HPI:     This is a pleasant female with lumbar stenosis. She does use a cane due to Left hip replacement issues. She presents to clinic today due to thick toenails which she is unable to trim herself. She states her right hallux toenail is painful and ingrown. Allergies: Patient has no known allergies. Current Outpatient Medications   Medication Sig Dispense Refill    Telmisartan-HCTZ 80-12.5 MG Oral Tab Take 1 tablet by mouth daily. Past Medical History:   Diagnosis Date    Anxiety state     Back problem     Dermatological disorder     per [employer]:  \"removal of cyst under the chin\"    Dermatological disorder     per [employer]:  \"removal of cyst under left arm\"    High blood pressure     Osteoarthritis     Pilonidal cyst     Unspecified essential hypertension       Past Surgical History:   Procedure Laterality Date    ELECTROCARDIOGRAM, COMPLETE  2014    Scanned to Media Tab - Date of Service 2014    OTHER SURGICAL HISTORY      laporoscopy    OTHER SURGICAL HISTORY      amniocentesis    SKIN SURGERY      excision of pilonidal cyst X2`    SKIN SURGERY      several cyst excision     TONSILLECTOMY      TOTAL HIP REPLACEMENT        Family History   Problem Relation Age of Onset    Cancer Mother         skin    Other (Other) Mother         Aortic aneurysm (cause of death)    Heart Disease Father         CAD (cause of death)      Social History    Socioeconomic History      Marital status:     Tobacco Use      Smoking status: Former        Packs/day: 1.00        Years: 16.00        Pack years: 16        Types: Cigarettes        Quit date: 7/10/2006        Years since quittin.0      Smokeless tobacco: Never    Vaping Use      Vaping Use: Never used    Substance and Sexual Activity      Alcohol use:  No Comment: occasionally      Drug use: No    Other Topics      Concerns:        Currently spends a great deal of time in the sun: No        Past Sunlamp Treatments for Acne: No        History of tanning: No        Hx of Spending [place] of Time in Sun: No        Bad sunburns in the past: No        Tanning Salons in the Past: Yes          20 years ago        Hx of Radiation Treatments: No        Regular use of sun block: No        Caffeine Concern: Yes          Soda rarely        Exercise: No          REVIEW OF SYSTEMS:   Denies nausea, fever, chills  No calf pain  No other muscle or joint aches  Denies chest pain or shortness of breath. EXAM:   There were no vitals taken for this visit. Constitutional:   Patient in no apparent distress. Well kept. Of normal body habitus. Alert and oriented to person, place, and time. Vascular Examination:  DP pulse is 2/4  PT pulse is 2/4  Capillary refill is immediate  Integumentary Examination:   The patient's nails appear incurvated, thickened, elongated, dystrophic, discolored with subungual debris 1-5 right, 1-5  left nails. Right hallux medial nail border incurvated and painful with no SOI    Digital hair growth is present left and is present right. Neurological Examination:  Sharp/dull is present to right and is present to left. Parasthesias absent. Musculoskeletal Examination:  Muscle Strength is 5/5. LABS & IMAGING:     Lab Results   Component Value Date    GLU 89 02/23/2019    BUN 21 (H) 02/23/2019    CREATSERUM 0.87 02/23/2019    BUNCREA 24.1 (H) 02/23/2019    ANIONGAP 6 02/23/2019    GFRAA 86 02/23/2019    GFRNAA 75 02/23/2019    CA 9.4 02/23/2019     02/23/2019    K 4.1 02/23/2019     (H) 02/23/2019    CO2 26.0 02/23/2019    OSMOCALC 296 (H) 02/23/2019        No results found for: EAG, A1C     No results found.      ASSESSMENT AND PLAN:   Diagnoses and all orders for this visit:    Onychomycosis    Ingrown toenail of right foot    Gait instability          Plan:     Reviewed treatment options for nail dystrophy / onychomycosis including:   No treatment and monitoring, topical meds, oral meds, nail removal   Topical meds are much safer yet carry very low efficacy. Pt has opted for debridement of nails and monitoring. Evaluated the patient. Discussed treatment options with the patient. Discussed with patient proper care and hygiene for their feet. Patient tolerated procedures well, without incident. Instrumentation used includes nail nippers and electric  where appropriate. Procedure: (76584 Debridement of toenails 6-10) Surgically debrided and mechanically reduced 6 or more toenails. Hemmorhage occurred none. Nails that were debrided appeared dystrophic and caused the patient pain in shoe gear. Nails 5 Left & 5 Right. If worsening Right hallux medial nail border ingrown will discuss possible matrixectomy in the future. RTC 3 months for nail care. No follow-ups on file.     Makeda Gonzales DPM  7/12/23

## 2023-07-20 ENCOUNTER — APPOINTMENT (OUTPATIENT)
Dept: CT IMAGING | Facility: HOSPITAL | Age: 63
End: 2023-07-20
Attending: NURSE PRACTITIONER
Payer: COMMERCIAL

## 2023-07-20 ENCOUNTER — HOSPITAL ENCOUNTER (OUTPATIENT)
Age: 63
Discharge: HOME | End: 2023-07-20
Attending: NURSE PRACTITIONER
Payer: COMMERCIAL

## 2023-07-20 VITALS
DIASTOLIC BLOOD PRESSURE: 67 MMHG | SYSTOLIC BLOOD PRESSURE: 129 MMHG | RESPIRATION RATE: 18 BRPM | OXYGEN SATURATION: 98 % | TEMPERATURE: 97 F | HEART RATE: 98 BPM

## 2023-07-20 DIAGNOSIS — R10.9 ABDOMINAL PAIN OF UNKNOWN ETIOLOGY: ICD-10-CM

## 2023-07-20 DIAGNOSIS — R10.32 ABDOMINAL PAIN, LEFT LOWER QUADRANT: Primary | ICD-10-CM

## 2023-07-20 DIAGNOSIS — K57.92 ACUTE DIVERTICULITIS: ICD-10-CM

## 2023-07-20 LAB
#MXD IC: 1.2 X10ˆ3/UL (ref 0.1–1)
BILIRUB UR QL STRIP: NEGATIVE
BUN BLD-MCNC: 15 MG/DL (ref 7–18)
CHLORIDE BLD-SCNC: 101 MMOL/L (ref 98–112)
CLARITY UR: CLEAR
CO2 BLD-SCNC: 28 MMOL/L (ref 21–32)
CREAT BLD-MCNC: 0.7 MG/DL
GFR SERPLBLD BASED ON 1.73 SQ M-ARVRAT: 98 ML/MIN/1.73M2 (ref 60–?)
GLUCOSE BLD-MCNC: 97 MG/DL (ref 70–99)
GLUCOSE UR STRIP-MCNC: NEGATIVE MG/DL
HCT VFR BLD AUTO: 42 %
HCT VFR BLD CALC: 45 %
HGB BLD-MCNC: 13.8 G/DL
HGB UR QL STRIP: NEGATIVE
ISTAT IONIZED CALCIUM FOR CHEM 8: 1.19 MMOL/L (ref 1.12–1.32)
KETONES UR STRIP-MCNC: NEGATIVE MG/DL
LYMPHOCYTES # BLD AUTO: 2.5 X10ˆ3/UL (ref 1–4)
LYMPHOCYTES NFR BLD AUTO: 14.1 %
MCH RBC QN AUTO: 31.5 PG (ref 26–34)
MCHC RBC AUTO-ENTMCNC: 32.9 G/DL (ref 31–37)
MCV RBC AUTO: 95.9 FL (ref 80–100)
MIXED CELL %: 7 %
NEUTROPHILS # BLD AUTO: 13.7 X10ˆ3/UL (ref 1.5–7.7)
NEUTROPHILS NFR BLD AUTO: 78.9 %
NITRITE UR QL STRIP: NEGATIVE
PH UR STRIP: 7 [PH]
PLATELET # BLD AUTO: 286 X10ˆ3/UL (ref 150–450)
POTASSIUM BLD-SCNC: 4.3 MMOL/L (ref 3.6–5.1)
PROT UR STRIP-MCNC: NEGATIVE MG/DL
RBC # BLD AUTO: 4.38 X10ˆ6/UL
SODIUM BLD-SCNC: 137 MMOL/L (ref 136–145)
SP GR UR STRIP: 1.01
UROBILINOGEN UR STRIP-ACNC: <2 MG/DL
WBC # BLD AUTO: 17.4 X10ˆ3/UL (ref 4–11)

## 2023-07-20 PROCEDURE — 74177 CT ABD & PELVIS W/CONTRAST: CPT | Performed by: NURSE PRACTITIONER

## 2023-07-20 PROCEDURE — 87086 URINE CULTURE/COLONY COUNT: CPT | Performed by: NURSE PRACTITIONER

## 2023-07-20 RX ORDER — AMOXICILLIN AND CLAVULANATE POTASSIUM 875; 125 MG/1; MG/1
1 TABLET, FILM COATED ORAL 2 TIMES DAILY
Qty: 14 TABLET | Refills: 0 | Status: SHIPPED | OUTPATIENT
Start: 2023-07-20 | End: 2023-07-27

## 2023-07-20 NOTE — ED INITIAL ASSESSMENT (HPI)
C/o LLQ pain since Tues. Pain is intermittent. Denies n/v/d/fever. Denies frequency/hematuria with urination.

## 2023-07-20 NOTE — DISCHARGE INSTRUCTIONS
Discussed, your CT scan shows diverticulitis. Antibiotics prescribed. Twice a day for 7 days. Drink plenty of water and electrolytes. Follow diet specific for diverticulitis. GI referral given. Go to ER if you have any new or worsening symptoms of abdominal pain, fevers above 100.4, nausea, vomiting, diarrhea.

## 2023-07-21 ENCOUNTER — PATIENT MESSAGE (OUTPATIENT)
Dept: FAMILY MEDICINE CLINIC | Facility: CLINIC | Age: 63
End: 2023-07-21

## 2023-07-21 ENCOUNTER — TELEPHONE (OUTPATIENT)
Dept: FAMILY MEDICINE CLINIC | Facility: CLINIC | Age: 63
End: 2023-07-21

## 2023-07-21 NOTE — TELEPHONE ENCOUNTER
Patient called,  she went to [facility] yesterday 7/20/23 and was diagnosed with diverticulitis and antibiotic was also prescribed. States she sent a  [employer] message earlier regarding diet recommendations for her diverticulitis, informed that MyChart takes 2-3 days before office can read it, instructed to contact office directly next time if she needs the response right away, stated understanding. Below informations provided to the patient; As you recover  Tips for recovery include:  Eat a low-fiber diet at first while you recover. Your healthcare provider may advise a liquid diet. This gives your bowel a chance to rest so that it can recover. Foods to include: flake cereal, mashed potatoes, pancakes, waffles, pasta, white bread, rice, applesauce, bananas, eggs, fish, poultry, tofu, and well-cooked vegetables  Take your medicines as directed. Don't stop taking the medicines, even if you feel better. Monitor your temperature and report any rise in temperature to your healthcare provider. Take antibiotics exactly as directed. Don't miss any and keep taking them even if you feel better. Drink 6 to 8 glasses of water every day, unless told otherwise. Use a heating pad or hot water bottle to reduce abdominal cramping or pain. Preventing diverticulitis in the future  Tips for prevention include:  Eat a high-fiber diet. Fiber adds bulk to the stool so that it passes through the large intestine more easily. Keep drinking 6 to 8 glasses of water every day, unless told otherwise. Start an exercise program. Ask your healthcare provider how to get started. You can benefit from simple activities such as walking or gardening. Treat diarrhea with a bland diet. Start with liquids only, then slowly add fiber over time. Watch for changes in your bowel movements (constipation to diarrhea). Prevent constipation with fiber and add a stool softener if needed. Get plenty of rest and sleep.   If possible, don't take nonsteroidal anti-inflammatory drugs (NSAIDs) such as ibuprofen. They increase the risk of diverticulitis. Patient has the following questions;  1.can she put pancake syrup=yes, can use organic pancake. 2.NSAIDS =avoid aspirin, Advil,motrin,aleve, but can take tylenol. 3. Is it true that she needs to avoid seeds-informed that this will cause flare ups. 4.when can she eat regular foods=instructed to let the stomach rest, can start on clear liquid, then bland, after 24 hrs with no pain, can start slowly increasing the diet . 5. Informed patient that RN will send more informations via [employer]=patient agrees. 6. Requesting for a follow up appointment=warm transferred to [facility] for assistance.        Future Appointments   Date Time Provider Isidra Gordillo   7/28/2023 11:00 AM Chandrakant Kelley DO [facility]

## 2023-07-28 ENCOUNTER — OFFICE VISIT (OUTPATIENT)
Dept: FAMILY MEDICINE CLINIC | Facility: CLINIC | Age: 63
End: 2023-07-28

## 2023-07-28 VITALS
WEIGHT: 234.63 LBS | TEMPERATURE: 98 F | HEART RATE: 80 BPM | DIASTOLIC BLOOD PRESSURE: 74 MMHG | SYSTOLIC BLOOD PRESSURE: 105 MMHG | HEIGHT: 67 IN | BODY MASS INDEX: 36.83 KG/M2

## 2023-07-28 DIAGNOSIS — K57.92 DIVERTICULITIS: Primary | ICD-10-CM

## 2023-07-28 DIAGNOSIS — Z12.11 SCREENING FOR COLON CANCER: ICD-10-CM

## 2023-07-28 DIAGNOSIS — I10 ESSENTIAL HYPERTENSION, BENIGN: ICD-10-CM

## 2023-07-28 DIAGNOSIS — M41.27 OTHER IDIOPATHIC SCOLIOSIS, LUMBOSACRAL REGION: ICD-10-CM

## 2023-07-28 DIAGNOSIS — Z12.4 CERVICAL CANCER SCREENING: ICD-10-CM

## 2023-07-28 DIAGNOSIS — M47.816 ARTHRITIS, LUMBAR SPINE: ICD-10-CM

## 2023-07-28 DIAGNOSIS — K21.9 GASTROESOPHAGEAL REFLUX DISEASE, UNSPECIFIED WHETHER ESOPHAGITIS PRESENT: ICD-10-CM

## 2023-07-28 DIAGNOSIS — Z12.31 VISIT FOR SCREENING MAMMOGRAM: ICD-10-CM

## 2023-07-28 PROCEDURE — 99214 OFFICE O/P EST MOD 30 MIN: CPT | Performed by: FAMILY MEDICINE

## 2023-07-28 PROCEDURE — 3078F DIAST BP <80 MM HG: CPT | Performed by: FAMILY MEDICINE

## 2023-07-28 PROCEDURE — 3074F SYST BP LT 130 MM HG: CPT | Performed by: FAMILY MEDICINE

## 2023-07-28 PROCEDURE — 3008F BODY MASS INDEX DOCD: CPT | Performed by: FAMILY MEDICINE

## 2023-07-28 RX ORDER — TELMISARTAN AND HYDROCHLORTHIAZIDE 40; 12.5 MG/1; MG/1
1 TABLET ORAL DAILY
Qty: 90 TABLET | Refills: 1 | Status: SHIPPED | OUTPATIENT
Start: 2023-07-28

## 2023-07-28 RX ORDER — FAMOTIDINE 20 MG/1
20 TABLET, FILM COATED ORAL 2 TIMES DAILY PRN
Qty: 60 TABLET | Refills: 0 | Status: SHIPPED | OUTPATIENT
Start: 2023-07-28

## 2023-08-01 ENCOUNTER — TELEPHONE (OUTPATIENT)
Dept: FAMILY MEDICINE CLINIC | Facility: CLINIC | Age: 63
End: 2023-08-01

## 2023-08-01 RX ORDER — TELMISARTAN 40 MG/1
40 TABLET ORAL DAILY
Qty: 90 TABLET | Refills: 0 | Status: SHIPPED | OUTPATIENT
Start: 2023-08-01

## 2023-08-01 RX ORDER — HYDROCHLOROTHIAZIDE 12.5 MG/1
12.5 CAPSULE, GELATIN COATED ORAL DAILY
Qty: 90 CAPSULE | Refills: 0 | Status: SHIPPED | OUTPATIENT
Start: 2023-08-01

## 2023-08-01 NOTE — TELEPHONE ENCOUNTER
Pt states insurance will not cover new script for telmisartan-hydrochlorothiazide 40-12.5MG combination script. Insurance will cover is script is written separately  Pt asking if Dr. Eartha Shone can just order one medication instead of a combination pill. Please advise. Pended 2 separate scripts. Please review and sign if approved. Pt states she has a few pills left from her previous dose. Medication Quantity Refills Start End   Telmisartan-HCTZ 40-12.5 MG Oral Tab 90 tablet 1 7/28/2023    Sig:   Take 1 tablet by mouth daily. For high blood pressure.      Route:   Oral     Order #:   [id number]

## 2023-09-15 ENCOUNTER — NURSE ONLY (OUTPATIENT)
Facility: CLINIC | Age: 63
End: 2023-09-15

## 2023-09-20 ENCOUNTER — OFFICE VISIT (OUTPATIENT)
Dept: PODIATRY CLINIC | Facility: CLINIC | Age: 63
End: 2023-09-20

## 2023-09-20 DIAGNOSIS — L60.0 INGROWN TOENAIL OF RIGHT FOOT: ICD-10-CM

## 2023-09-20 DIAGNOSIS — M79.675 PAIN IN TOES OF BOTH FEET: ICD-10-CM

## 2023-09-20 DIAGNOSIS — M79.674 PAIN IN TOES OF BOTH FEET: ICD-10-CM

## 2023-09-20 DIAGNOSIS — R26.81 GAIT INSTABILITY: ICD-10-CM

## 2023-09-20 DIAGNOSIS — B35.1 ONYCHOMYCOSIS: Primary | ICD-10-CM

## 2023-09-20 PROCEDURE — 99213 OFFICE O/P EST LOW 20 MIN: CPT | Performed by: PODIATRIST

## 2023-09-20 NOTE — PROGRESS NOTES
[facility], [facility] Podiatry  Progress Note    Bebe Gonzalez is a 66year old female. Patient presents with:  Toenail Care: 2 mo f/u - the nail on the L big toe is painful and it seems is like ingrown -         HPI:     This is a pleasant female with lumbar stenosis. She does use a cane due to Left hip replacement issues. She presents to clinic today due to thick painful toenails which she is unable to trim herself. Allergies: Patient has no known allergies. Current Outpatient Medications   Medication Sig Dispense Refill    telmisartan 40 MG Oral Tab Take 1 tablet (40 mg total) by mouth daily. For high blood pressure. 90 tablet 0    hydroCHLOROthiazide 12.5 MG Oral Cap Take 1 capsule (12.5 mg total) by mouth daily. For high blood pressure. 90 capsule 0    famotidine (PEPCID) 20 MG Oral Tab Take 1 tablet (20 mg total) by mouth 2 (two) times daily as needed for Heartburn.  60 tablet 0      Past Medical History:   Diagnosis Date    Anxiety state     Back problem     Dermatological disorder     per Next[employer]:  \"removal of cyst under the chin\"    Dermatological disorder     per NextGen:  \"removal of cyst under left arm\"    High blood pressure     Osteoarthritis     Pilonidal cyst     Unspecified essential hypertension       Past Surgical History:   Procedure Laterality Date    ELECTROCARDIOGRAM, COMPLETE  06-    Scanned to Media Tab - Date of Service 06-    OTHER SURGICAL HISTORY      laporoscopy    OTHER SURGICAL HISTORY      amniocentesis    SKIN SURGERY      excision of pilonidal cyst X2`    SKIN SURGERY      several cyst excision     TONSILLECTOMY      TOTAL HIP REPLACEMENT        Family History   Problem Relation Age of Onset    Cancer Mother         skin    Other (Other) Mother         Aortic aneurysm (cause of death)    Heart Disease Father         CAD (cause of death)      Social History    Socioeconomic History      Marital status:     Tobacco Use      Smoking status: Former Packs/day: 1.00        Years: 16.00        Additional pack years: 0.00        Total pack years: 16.00        Types: Cigarettes        Quit date: 7/10/2006        Years since quittin.2      Smokeless tobacco: Never    Vaping Use      Vaping Use: Never used    Substance and Sexual Activity      Alcohol use: No        Comment: occasionally      Drug use: No    Other Topics      Concerns:        Currently spends a great deal of time in the sun: No        Past Sunlamp Treatments for Acne: No        History of tanning: No        Hx of Spending [place] of Time in Sun: No        Bad sunburns in the past: No        Tanning Salons in the Past: Yes          20 years ago        Hx of Radiation Treatments: No        Regular use of sun block: No        Caffeine Concern: Yes          Soda rarely        Exercise: No          REVIEW OF SYSTEMS:   Denies nausea, fever, chills  No calf pain  No other muscle or joint aches  Denies chest pain or shortness of breath. EXAM:   There were no vitals taken for this visit. Constitutional:   Patient in no apparent distress. Well kept. Of normal body habitus. Alert and oriented to person, place, and time. Vascular Examination:  DP pulse is 2/4  PT pulse is 2/4  Capillary refill is immediate  Integumentary Examination:   The patient's nails appear incurvated, thickened, elongated, dystrophic, discolored with subungual debris 1-5 right, 1-5  left nails. Right hallux medial nail border incurvated and painful with no SOI    Digital hair growth is present left and is present right. Neurological Examination:  Sharp/dull is present to right and is present to left. Parasthesias absent. Musculoskeletal Examination:  Muscle Strength is 5/5.   POP to toenails        LABS & IMAGING:     Lab Results   Component Value Date    GLU 89 2019    BUN 21 (H) 2019    CREATSERUM 0.87 2019    BUNCREA 24.1 (H) 2019    ANIONGAP 6 2019    GFRAA 86 2019    GFRNAA 75 02/23/2019    CA 9.4 02/23/2019     02/23/2019    K 4.1 02/23/2019     (H) 02/23/2019    CO2 26.0 02/23/2019    OSMOCALC 296 (H) 02/23/2019        No results found for: \"EAG\", \"A1C\"     No results found. ASSESSMENT AND PLAN:   Diagnoses and all orders for this visit:    Onychomycosis    Ingrown toenail of right foot    Gait instability    Pain in toes of both feet            Plan:     Reviewed treatment options for nail dystrophy / onychomycosis including:   No treatment and monitoring, topical meds, oral meds, nail removal   Topical meds are much safer yet carry very low efficacy. Pt has opted for debridement of nails and monitoring. Evaluated the patient. Discussed treatment options with the patient. Discussed with patient proper care and hygiene for their feet. Patient tolerated procedures well, without incident. Instrumentation used includes nail nippers and electric  where appropriate. Procedure: (30586 Debridement of toenails 6-10) Surgically debrided and mechanically reduced 6 or more toenails. Hemmorhage occurred none. Nails that were debrided appeared dystrophic and caused the patient pain in shoe gear. Nails 5 Left & 5 Right. RTC 3 months for nail care. No follow-ups on file.     Eric Lawrence DPM  9/20/23

## 2023-09-28 NOTE — PROGRESS NOTES
[facility] [place]    [place]    Francisco Chang Patient Status:  Inpatient    6/15/1960 MRN [id number]   Location [facility] 4W/SW/SE Attending Meño Arevalo MD   [facility] Day # 3 PCP Adenike Montenegro DO       Subjective:   Duy Postsurgical changes of left hip arthroplasty with radiographically uncomplicated hardware.      Dictated by (CST): Juan Gonzalez MD on 7/03/2018 at 16:11     Approved by (CST): Juan Gonzalez MD on 7/03/2018 at 16:13          Xr Hip W Or Wo Pelvis 1 Vie Detail Level: Detailed

## 2023-10-05 ENCOUNTER — OFFICE VISIT (OUTPATIENT)
Dept: FAMILY MEDICINE CLINIC | Facility: CLINIC | Age: 63
End: 2023-10-05

## 2023-10-05 ENCOUNTER — LAB ENCOUNTER (OUTPATIENT)
Dept: LAB | Age: 63
End: 2023-10-05
Attending: PHYSICIAN ASSISTANT
Payer: COMMERCIAL

## 2023-10-05 ENCOUNTER — NURSE TRIAGE (OUTPATIENT)
Dept: FAMILY MEDICINE CLINIC | Facility: CLINIC | Age: 63
End: 2023-10-05

## 2023-10-05 VITALS
HEART RATE: 92 BPM | DIASTOLIC BLOOD PRESSURE: 85 MMHG | HEIGHT: 67 IN | WEIGHT: 236 LBS | BODY MASS INDEX: 37.04 KG/M2 | SYSTOLIC BLOOD PRESSURE: 130 MMHG

## 2023-10-05 DIAGNOSIS — R19.7 DIARRHEA OF PRESUMED INFECTIOUS ORIGIN: ICD-10-CM

## 2023-10-05 DIAGNOSIS — R19.7 DIARRHEA OF PRESUMED INFECTIOUS ORIGIN: Primary | ICD-10-CM

## 2023-10-05 DIAGNOSIS — R10.33 PERIUMBILICAL ABDOMINAL PAIN: ICD-10-CM

## 2023-10-05 LAB
ANION GAP SERPL CALC-SCNC: 5 MMOL/L (ref 0–18)
BASOPHILS # BLD AUTO: 0.08 X10(3) UL (ref 0–0.2)
BASOPHILS NFR BLD AUTO: 0.6 %
BILIRUB UR QL CFM: NEGATIVE
BUN BLD-MCNC: 15 MG/DL (ref 7–18)
BUN/CREAT SERPL: 16.7 (ref 10–20)
CALCIUM BLD-MCNC: 10 MG/DL (ref 8.5–10.1)
CHLORIDE SERPL-SCNC: 106 MMOL/L (ref 98–112)
CO2 SERPL-SCNC: 30 MMOL/L (ref 21–32)
COLOR UR: YELLOW
CREAT BLD-MCNC: 0.9 MG/DL
DEPRECATED RDW RBC AUTO: 45.9 FL (ref 35.1–46.3)
EGFRCR SERPLBLD CKD-EPI 2021: 73 ML/MIN/1.73M2 (ref 60–?)
EOSINOPHIL # BLD AUTO: 0.37 X10(3) UL (ref 0–0.7)
EOSINOPHIL NFR BLD AUTO: 2.9 %
ERYTHROCYTE [DISTWIDTH] IN BLOOD BY AUTOMATED COUNT: 13.1 % (ref 11–15)
FASTING STATUS PATIENT QL REPORTED: NO
GLUCOSE BLD-MCNC: 110 MG/DL (ref 70–99)
GLUCOSE UR-MCNC: NEGATIVE MG/DL
HCT VFR BLD AUTO: 42.5 %
HGB BLD-MCNC: 13.7 G/DL
HGB UR QL STRIP.AUTO: NEGATIVE
IMM GRANULOCYTES # BLD AUTO: 0.05 X10(3) UL (ref 0–1)
IMM GRANULOCYTES NFR BLD: 0.4 %
KETONES UR-MCNC: NEGATIVE MG/DL
LEUKOCYTE ESTERASE UR QL STRIP.AUTO: NEGATIVE
LYMPHOCYTES # BLD AUTO: 2.77 X10(3) UL (ref 1–4)
LYMPHOCYTES NFR BLD AUTO: 21.4 %
MCH RBC QN AUTO: 30.9 PG (ref 26–34)
MCHC RBC AUTO-ENTMCNC: 32.2 G/DL (ref 31–37)
MCV RBC AUTO: 95.7 FL
MONOCYTES # BLD AUTO: 0.72 X10(3) UL (ref 0.1–1)
MONOCYTES NFR BLD AUTO: 5.6 %
NEUTROPHILS # BLD AUTO: 8.98 X10 (3) UL (ref 1.5–7.7)
NEUTROPHILS # BLD AUTO: 8.98 X10(3) UL (ref 1.5–7.7)
NEUTROPHILS NFR BLD AUTO: 69.1 %
NITRITE UR QL STRIP.AUTO: NEGATIVE
OSMOLALITY SERPL CALC.SUM OF ELEC: 293 MOSM/KG (ref 275–295)
PH UR: 5.5 [PH] (ref 5–8)
PLATELET # BLD AUTO: 344 10(3)UL (ref 150–450)
POTASSIUM SERPL-SCNC: 3.6 MMOL/L (ref 3.5–5.1)
PROT UR-MCNC: NEGATIVE MG/DL
RBC # BLD AUTO: 4.44 X10(6)UL
SODIUM SERPL-SCNC: 141 MMOL/L (ref 136–145)
SP GR UR STRIP: >=1.03 (ref 1–1.03)
UROBILINOGEN UR STRIP-ACNC: 0.2
WBC # BLD AUTO: 13 X10(3) UL (ref 4–11)

## 2023-10-05 PROCEDURE — 81001 URINALYSIS AUTO W/SCOPE: CPT

## 2023-10-05 PROCEDURE — 36415 COLL VENOUS BLD VENIPUNCTURE: CPT

## 2023-10-05 PROCEDURE — 3079F DIAST BP 80-89 MM HG: CPT | Performed by: PHYSICIAN ASSISTANT

## 2023-10-05 PROCEDURE — 80048 BASIC METABOLIC PNL TOTAL CA: CPT

## 2023-10-05 PROCEDURE — 81015 MICROSCOPIC EXAM OF URINE: CPT

## 2023-10-05 PROCEDURE — 3075F SYST BP GE 130 - 139MM HG: CPT | Performed by: PHYSICIAN ASSISTANT

## 2023-10-05 PROCEDURE — 85025 COMPLETE CBC W/AUTO DIFF WBC: CPT

## 2023-10-05 PROCEDURE — 3008F BODY MASS INDEX DOCD: CPT | Performed by: PHYSICIAN ASSISTANT

## 2023-10-05 PROCEDURE — 99213 OFFICE O/P EST LOW 20 MIN: CPT | Performed by: PHYSICIAN ASSISTANT

## 2023-10-13 ENCOUNTER — OFFICE VISIT (OUTPATIENT)
Dept: FAMILY MEDICINE CLINIC | Facility: CLINIC | Age: 63
End: 2023-10-13

## 2023-10-13 VITALS
HEART RATE: 87 BPM | DIASTOLIC BLOOD PRESSURE: 90 MMHG | BODY MASS INDEX: 36.88 KG/M2 | WEIGHT: 235 LBS | HEIGHT: 67 IN | TEMPERATURE: 97 F | SYSTOLIC BLOOD PRESSURE: 137 MMHG

## 2023-10-13 DIAGNOSIS — K21.9 GASTROESOPHAGEAL REFLUX DISEASE, UNSPECIFIED WHETHER ESOPHAGITIS PRESENT: ICD-10-CM

## 2023-10-13 DIAGNOSIS — I10 ESSENTIAL HYPERTENSION: Primary | ICD-10-CM

## 2023-10-13 DIAGNOSIS — K57.92 DIVERTICULITIS: ICD-10-CM

## 2023-10-13 DIAGNOSIS — Z12.83 SKIN CANCER SCREENING: ICD-10-CM

## 2023-10-13 DIAGNOSIS — L98.9 SKIN LESION OF LEFT ARM: ICD-10-CM

## 2023-10-13 PROCEDURE — 99214 OFFICE O/P EST MOD 30 MIN: CPT | Performed by: FAMILY MEDICINE

## 2023-10-13 PROCEDURE — 3080F DIAST BP >= 90 MM HG: CPT | Performed by: FAMILY MEDICINE

## 2023-10-13 PROCEDURE — 3008F BODY MASS INDEX DOCD: CPT | Performed by: FAMILY MEDICINE

## 2023-10-13 PROCEDURE — 3075F SYST BP GE 130 - 139MM HG: CPT | Performed by: FAMILY MEDICINE

## 2023-10-13 RX ORDER — TELMISARTAN 80 MG/1
80 TABLET ORAL DAILY
Qty: 90 TABLET | Refills: 1 | Status: SHIPPED | OUTPATIENT
Start: 2023-10-13

## 2023-10-13 RX ORDER — HYDROCHLOROTHIAZIDE 12.5 MG/1
12.5 CAPSULE, GELATIN COATED ORAL DAILY
Qty: 90 CAPSULE | Refills: 2 | Status: SHIPPED | OUTPATIENT
Start: 2023-10-13

## 2023-10-13 RX ORDER — FAMOTIDINE 20 MG/1
20 TABLET, FILM COATED ORAL 2 TIMES DAILY PRN
Qty: 60 TABLET | Refills: 0 | Status: SHIPPED | OUTPATIENT
Start: 2023-10-13

## 2023-10-19 ENCOUNTER — TELEPHONE (OUTPATIENT)
Facility: CLINIC | Age: 63
End: 2023-10-19

## 2023-10-19 ENCOUNTER — OFFICE VISIT (OUTPATIENT)
Facility: CLINIC | Age: 63
End: 2023-10-19

## 2023-10-19 VITALS
DIASTOLIC BLOOD PRESSURE: 83 MMHG | HEART RATE: 93 BPM | BODY MASS INDEX: 36.88 KG/M2 | WEIGHT: 235 LBS | HEIGHT: 67 IN | SYSTOLIC BLOOD PRESSURE: 131 MMHG

## 2023-10-19 DIAGNOSIS — K21.9 GASTROESOPHAGEAL REFLUX DISEASE, UNSPECIFIED WHETHER ESOPHAGITIS PRESENT: ICD-10-CM

## 2023-10-19 DIAGNOSIS — K21.9 GASTROESOPHAGEAL REFLUX DISEASE WITHOUT ESOPHAGITIS: ICD-10-CM

## 2023-10-19 DIAGNOSIS — Z87.19 HISTORY OF DIVERTICULITIS: ICD-10-CM

## 2023-10-19 DIAGNOSIS — Z12.11 SCREEN FOR COLON CANCER: Primary | ICD-10-CM

## 2023-10-19 DIAGNOSIS — Z12.11 COLON CANCER SCREENING: Primary | ICD-10-CM

## 2023-10-19 PROCEDURE — 3079F DIAST BP 80-89 MM HG: CPT | Performed by: PHYSICIAN ASSISTANT

## 2023-10-19 PROCEDURE — S0285 CNSLT BEFORE SCREEN COLONOSC: HCPCS | Performed by: PHYSICIAN ASSISTANT

## 2023-10-19 PROCEDURE — 3075F SYST BP GE 130 - 139MM HG: CPT | Performed by: PHYSICIAN ASSISTANT

## 2023-10-19 PROCEDURE — 3008F BODY MASS INDEX DOCD: CPT | Performed by: PHYSICIAN ASSISTANT

## 2023-10-19 RX ORDER — SODIUM, POTASSIUM,MAG SULFATES 17.5-3.13G
SOLUTION, RECONSTITUTED, ORAL ORAL
Qty: 1 EACH | Refills: 0 | Status: SHIPPED | OUTPATIENT
Start: 2023-10-19

## 2023-10-19 RX ORDER — OMEPRAZOLE 20 MG/1
20 CAPSULE, DELAYED RELEASE ORAL 2 TIMES DAILY
Qty: 20 CAPSULE | Refills: 0 | Status: SHIPPED | OUTPATIENT
Start: 2023-10-19

## 2023-10-19 NOTE — TELEPHONE ENCOUNTER
Scheduled for:  Colonoscopy 64369/37783 & EGD 99513  Provider Name:  Dr. Bhupinder Krause  Date:  3/18/2024  Location:  [address] [place] [address] 1  Sedation:  MAC  Time:  11:00 am, arrival 10:00 am  Prep:  Suprep  Meds/Allergies Reconciled?:  Geeta Clark PA-C reviewed  Diagnosis with codes:  Screening for colon cancer Z12.11; Hx of diverticulitis Z87.19; GERD K21.9  Was patient informed to call insurance with codes (Y/N):  Yes  Referral sent?: Referral was sent at the time of electronic surgical scheduling. [facility] or [facility] notified?:  I sent an electronic request to Endo Scheduling and received a confirmation today. Medication Orders:  Pt is aware to NOT take iron pills, herbal meds and diet supplements for 7 days before exam. Also to NOT take any form of alcohol, recreational drugs and any forms of ED meds 24 hours before exam.   Misc Orders:  N/A     Further instructions given by staff:  Prep instructions were given to pt in the office, pt verbalized understanding.

## 2023-10-19 NOTE — H&P
[facility], [facility] - Gastroenterology                                                                                                               Reason for consult: Patient presents with:  Consult: No prior colon or EGD; having heartburn      Requesting physician or provider: Nelida Callaway DO      HPI:   Ivan Robin is a 66year old year-old female with history of HTN, HLD, spinal stenosis, anxiety, depression, GERD, diverticulitis, who presents for crc screening and GERD. GERD-  Having intermittent GERD depending on what she is eating. she denies dysphagia, odynophagia and/or globus. she denies abdominal pain. she denies nausea and/or vomiting. she denies recent change in appetite and/or unintentional weight loss. she denies bloating. She took over the counter prilosec and had relief. She notes no relief with famotidine. CRC- Had first diverticulitis flare in July 2023. She was started on antibiotics and then had immediate improvement. CT listed below. She notes one week ago had elevated WBC and abdominal pain, diarrhea was seen by PCP and advised possible repeat flare. Was started on 10 day of antibiotics and finished 2 days ago. Today, feels well. No abdominal pain. No fevers, chills or diarrhea. BM every day. she denies straining and/or incomplete evacuation. she denies brbpr and/or melena. CT 7/20/23  Impression  CONCLUSION:     Acute sigmoid colonic diverticulitis. No evidence of pneumoperitoneum or an adjacent pericolonic collection, although evaluation of the pelvis is limited secondary to metallic artifact from the bilateral total hip arthroplasties. Lesser incidental findings are described above.           Dictated by (CST): Rafy Green MD on 7/20/2023 at 12:16 PM      Finalized by (CST): Rafy Green MD on 7/20/2023 at 12:29 PM          NSAIDS: none  Tobacco: former  Alcohol: social  Marijuana: none  Illicit drugs: none    FH GI malignancy- colon cancer (Father 76s)  FH celiac dz- none  FH liver dz- none  FH IBD- none    No history of adverse reaction to sedation  No anticoagulants  No pacemaker/defibrillator  No pain medications and/or sleep aides      Last colonoscopy: none  Last EGD: none    Wt Readings from Last 6 Encounters:  10/19/23 : 235 lb (106.6 kg)  10/13/23 : 235 lb (106.6 kg)  10/05/23 : 236 lb (107 kg)  23 : 234 lb 9.6 oz (106.4 kg)  22 : 232 lb 6.4 oz (105.4 kg)  10/10/22 : 227 lb (103 kg)       History, Medications, Allergies, ROS:      Past Medical History:   Diagnosis Date    Anxiety state     Back problem     Constipation     Dermatological disorder     per Next[employer]:  \"removal of cyst under the chin\"    Dermatological disorder     per NextGen:  \"removal of cyst under left arm\"    Diverticular disease     Hemorrhoids     High blood pressure     Osteoarthritis     Pilonidal cyst     Unspecified essential hypertension       Past Surgical History:   Procedure Laterality Date    ELECTROCARDIOGRAM, COMPLETE  2014    Scanned to Media Tab - Date of Service 2014    OTHER SURGICAL HISTORY      laporoscopy    OTHER SURGICAL HISTORY      amniocentesis    SKIN SURGERY      excision of pilonidal cyst X2`    SKIN SURGERY      several cyst excision     TONSILLECTOMY      TOTAL HIP REPLACEMENT        Family Hx:   Family History   Problem Relation Age of Onset    Heart Disease Father         CAD (cause of death)    Cancer Father         colon    Cancer Mother         skin    Other (Other) Mother         Aortic aneurysm (cause of death)    Cancer Brother       Social History:   Social History     Socioeconomic History    Marital status:    Tobacco Use    Smoking status: Former     Packs/day: 1.00     Years: 17.00     Additional pack years: 0.00     Total pack years: 17.00     Types: Cigarettes     Quit date: 2006     Years since quittin.5    Smokeless tobacco: Never   Vaping Use Vaping Use: Never used   Substance and Sexual Activity    Alcohol use: Yes     Alcohol/week: 4.0 standard drinks of alcohol     Types: 4 Cans of beer per week     Comment: occasionally    Drug use: No   Other Topics Concern    Currently spends a great deal of time in the sun No    Past Sunlamp Treatments for Acne No    History of tanning No    Hx of Spending [place] of Time in Sun No    Bad sunburns in the past No    Tanning Salons in the Past Yes     Comment: 20 years ago    Hx of Radiation Treatments No    Regular use of sun block No    Caffeine Concern Yes     Comment: Soda rarely    Exercise No   Social History Narrative    The patient does not use an assistive device. .      The patient does live in a home with stairs. Medications (Active prior to today's visit):  Current Outpatient Medications   Medication Sig Dispense Refill    omeprazole 20 MG Oral Capsule Delayed Release Take 1 capsule (20 mg total) by mouth 2 (two) times daily. 20 capsule 0    Na Sulfate-K Sulfate-Mg Sulf (SUPREP BOWEL PREP KIT) 17.5-3.13-1.6 GM/177ML Oral Solution Take prep as directed by gastro office. May substitute with Trilyte/generic equivalent if needed. 1 each 0    telmisartan 80 MG Oral Tab Take 1 tablet (80 mg total) by mouth daily. For high blood pressure. 90 tablet 1    hydroCHLOROthiazide 12.5 MG Oral Cap Take 1 capsule (12.5 mg total) by mouth daily. For high blood pressure. 90 capsule 2    famotidine (PEPCID) 20 MG Oral Tab Take 1 tablet (20 mg total) by mouth 2 (two) times daily as needed for Heartburn.  60 tablet 0       Allergies:  No Known Allergies    ROS:   CONSTITUTIONAL: negative for fevers, chills, sweats and weight loss  EYES Negative for red eyes, yellow eyes, changes in vision  HEENT: Negative for dysphagia and hoarseness  RESPIRATORY: Negative for cough and shortness of breath  CARDIOVASCULAR: Negative for chest pain, palpitations  GASTROINTESTINAL: See HPI  GENITOURINARY: Negative for dysuria and frequency  MUSCULOSKELETAL: Negative for arthralgias and myalgias  NEUROLOGICAL: Negative for dizziness and headaches  BEHAVIOR/PSYCH: Negative for anxiety and poor appetite    PHYSICAL EXAM:   Blood pressure 131/83, pulse 93, height 5' 7\" (1.702 m), weight 235 lb (106.6 kg), not currently breastfeeding. GEN: WD/WN, NAD  HEENT: Supple symmetrical, trachea midline  CV: RRR, the extremities are warm and well perfused   LUNGS: No increased work of breathing  ABDOMEN: No scars, normal bowel sounds, soft, non-tender, non-distended no rebound or guarding, no masses, no hepatomegaly  MSK: No redness, no warmth, no swelling of joints  SKIN: No jaundice, no erythema, no rashes  HEMATOLOGIC: No bleeding, no bruising  NEURO: Alert and interactive, normal gait    Labs/Imaging/Procedures:     Patient's pertinent labs and imaging were reviewed and discussed with patient today. Lab Results   Component Value Date    WBC 13.0 (H) 10/05/2023    RBC 4.44 10/05/2023    HGB 13.7 10/05/2023    HCT 42.5 10/05/2023    MCV 95.7 10/05/2023    MCH 30.9 10/05/2023    MCHC 32.2 10/05/2023    RDW 13.1 10/05/2023    .0 10/05/2023    MPV 8.1 12/11/2018        Lab Results   Component Value Date     (H) 10/05/2023    BUN 15 10/05/2023    BUNCREA 16.7 10/05/2023    CREATSERUM 0.90 10/05/2023    ANIONGAP 5 10/05/2023    GFRNAA 75 02/23/2019    GFRAA 86 02/23/2019    CA 10.0 10/05/2023    OSMOCALC 293 10/05/2023    ALKPHO 97 02/23/2019    AST 16 02/23/2019    ALT 29 02/23/2019    ALKPHOS 61 05/30/2015    BILT 0.7 02/23/2019    TP 7.7 02/23/2019    ALB 3.9 02/23/2019    GLOBULIN 3.8 02/23/2019    AGRATIO 1.2 05/30/2015     10/05/2023    K 3.6 10/05/2023     10/05/2023    CO2 30.0 10/05/2023        No results found. .  ASSESSMENT/PLAN:   Raman Neal is a 66year old year-old female with history of HTN, HLD, spinal stenosis, anxiety, depression, GERD, diverticulitis, who presents for crc screening and GERD. #GERD  Intermittent. Based on diet. Denies red flag symptoms. Has had improvement with PPI in the past. No improvement with H2 blocker. Denies family hx of esophageal cancer. Patient would like EGD with cln to evaluate for any esophageal changes. #crc screening  #hx of diverticulitis  Patient has had 2 diverticulitis flares in last 3 months. Had CT a/p July for first flare. Has responded well to ABX both times. Denies pain and change in BM today. Most recent flare 10/5. Denies blood in stool or dark black stools. No personal or family hx of colon cancer. No unintentional weight loss or decreased appetite. Advised for cln 6-8 weeks after last flare. Patient agreeable. Recommendations:  - avoid nuts, strawberries, seedy bread, pop corn, poppy seeds  - restart omeprazole 20 mg for one month, can then stop     - if she restarts then she should take the medication for 2-4 weeks  -May take Tums or other over the counter antacid to relieve intermittent heartburn  -Chew foods carefully prior to swallowing  -Increase water intake to 8 (8oz) glasses per day  -Decrease food triggers (Spicy foods, milk products near end of day, chocolate and fatty foods)  -Avoid aspirin, NSAIDs (i.e. Ibuprofen, motrin, Advil, Aleeve, naproxen), caffeine, alcohol, tobacco  -Avoid eating meals 3 hours before bed time  -Avoid wearing tight fitting clothes  -Elevate head of bed to 30 degrees with blocks under legs at head of bed     1. Schedule colonoscopy/EGD with Dr. Gerri Guadalupe or DR. Flavia Chaudhary with [facility] [Diagnosis: crc screening, hx of diverticulitis, GERD]    2.  bowel prep from pharmacy (split suprep)    3. Continue all medications as normal for your procedure. 4. Read all bowel prep instructions carefully. Bowel prep instructions can also be found online at:  www.[facility].org/giprep     5. AVOID seeds, nuts, popcorn, raw fruits and vegetables for 3 days before procedure    6.  You MAY need to go for COVID testing 72 hours before procedure. The testing team will call you a few days before your procedure to discuss with you if testing is required. If you are asked to go for COVID testing and do not completed the test, the procedure cannot be performed. 7. If you start any NEW medication after your visit today, please notify us. Certain medications (like iron or weight loss medications) will need to be held before the procedure, or the procedure cannot be performed safely. Orders This Visit:  No orders of the defined types were placed in this encounter. Meds This Visit:  Requested Prescriptions     Signed Prescriptions Disp Refills    omeprazole 20 MG Oral Capsule Delayed Release 20 capsule 0     Sig: Take 1 capsule (20 mg total) by mouth 2 (two) times daily. Na Sulfate-K Sulfate-Mg Sulf (SUPREP BOWEL PREP KIT) 17.5-3.13-1.6 GM/177ML Oral Solution 1 each 0     Sig: Take prep as directed by gastro office. May substitute with Trilyte/generic equivalent if needed. Imaging & Referrals:  None    ENDOSCOPIC RISK BENEFIT DISCUSSION: I described the procedure in great detail with the patient. I discussed the risks and benefits, including but not limited to: bleeding, perforation, infection, anesthesia complications, and even death. Patient will be NPO after midnight and will have a person physically present at time of pick-up to drive patient home. Patient verbalized understanding and agrees to proceed with procedure as planned. Noé Alonzo, DOROTHEA   10/19/2023        This note was partially prepared using [employer] voice recognition dictation software. As a result, errors may occur. When identified, these errors have been corrected.  While every attempt is made to correct errors during dictation, discrepancies may still exist.

## 2023-10-24 ENCOUNTER — PATIENT MESSAGE (OUTPATIENT)
Facility: CLINIC | Age: 63
End: 2023-10-24

## 2023-10-24 RX ORDER — OMEPRAZOLE 20 MG/1
20 CAPSULE, DELAYED RELEASE ORAL
Qty: 180 CAPSULE | Refills: 0 | Status: SHIPPED | OUTPATIENT
Start: 2023-10-24 | End: 2023-10-26 | Stop reason: ALTCHOICE

## 2023-10-24 NOTE — TELEPHONE ENCOUNTER
From: Sandra Alvarez  To: Ramos Wise  Sent: 10/24/2023 12:12 PM CDT  Subject: Rx - Omeprazole 20 mg    Hello, I had an appt with Marianne Townsend last week, Oct 19, 2023. We discussed the process for my future colonoscopy. And we discussed my acid reflux as well. Per our conversation she explained that one can be on this medication for life or now and then as needed. She suggested that I take this medication for a month and see how I am for the 2 weeks following. Then I can start up again or just continue to use daily. However, when I picked up the Rx, I was advised that the insurance will not cover for a 2 x a day dose and only offered once a day. My question is why is the Rx only issued for 10 days? As I stated I felt I would be using the medication for the long term. Can someone please respond back as soon as they can please.     Stew Smith

## 2023-10-25 NOTE — TELEPHONE ENCOUNTER
[facility]    Please see below message from patient. Insurance will only cover daily dosing. Can she do omeprazole 40mg once a day instead of 20mg BID?     Thank you

## 2023-10-26 RX ORDER — OMEPRAZOLE 40 MG/1
40 CAPSULE, DELAYED RELEASE ORAL DAILY
Qty: 90 CAPSULE | Refills: 4 | Status: SHIPPED | OUTPATIENT
Start: 2023-10-26

## 2023-11-30 ENCOUNTER — OFFICE VISIT (OUTPATIENT)
Dept: PODIATRY CLINIC | Facility: CLINIC | Age: 63
End: 2023-11-30

## 2023-11-30 DIAGNOSIS — R26.81 GAIT INSTABILITY: ICD-10-CM

## 2023-11-30 DIAGNOSIS — B35.1 ONYCHOMYCOSIS: ICD-10-CM

## 2023-11-30 DIAGNOSIS — M79.674 PAIN IN TOES OF BOTH FEET: Primary | ICD-10-CM

## 2023-11-30 DIAGNOSIS — B35.3 TINEA PEDIS OF BOTH FEET: ICD-10-CM

## 2023-11-30 DIAGNOSIS — M79.675 PAIN IN TOES OF BOTH FEET: Primary | ICD-10-CM

## 2023-11-30 PROCEDURE — 99213 OFFICE O/P EST LOW 20 MIN: CPT | Performed by: STUDENT IN AN ORGANIZED HEALTH CARE EDUCATION/TRAINING PROGRAM

## 2023-11-30 RX ORDER — CLOTRIMAZOLE AND BETAMETHASONE DIPROPIONATE 10; .64 MG/G; MG/G
1 CREAM TOPICAL 2 TIMES DAILY
Qty: 45 G | Refills: 3 | Status: SHIPPED | OUTPATIENT
Start: 2023-11-30

## 2023-11-30 NOTE — PROGRESS NOTES
[facility], [facility] Podiatry  Progress Note      Bull Zhang is a 66year old female. Chief Complaint   Patient presents with    Toenail Care     2 mo f/u - was seen by EM on 9/20/23 - has no c/o regarding her feet              HPI:     Patient is a pleasant 80-year-old female who presents to clinic today for bilateral foot evaluation. Patient is unable to reach her feet and trim her nails due to back problems and left hip problem. Allergies: Patient has no known allergies. Current Outpatient Medications   Medication Sig Dispense Refill    Omeprazole 40 MG Oral Capsule Delayed Release Take 1 capsule (40 mg total) by mouth daily. Take 1 capsule by mouth daily before breakfast. 90 capsule 4    Na Sulfate-K Sulfate-Mg Sulf (SUPREP BOWEL PREP KIT) 17.5-3.13-1.6 GM/177ML Oral Solution Take prep as directed by gastro office. May substitute with Trilyte/generic equivalent if needed. 1 each 0    telmisartan 80 MG Oral Tab Take 1 tablet (80 mg total) by mouth daily. For high blood pressure. 90 tablet 1    hydroCHLOROthiazide 12.5 MG Oral Cap Take 1 capsule (12.5 mg total) by mouth daily. For high blood pressure. 90 capsule 2    famotidine (PEPCID) 20 MG Oral Tab Take 1 tablet (20 mg total) by mouth 2 (two) times daily as needed for Heartburn.  60 tablet 0      Past Medical History:   Diagnosis Date    Anxiety state     Back problem     Constipation     Dermatological disorder     per [employer]:  \"removal of cyst under the chin\"    Dermatological disorder     per Next[employer]:  \"removal of cyst under left arm\"    Diverticular disease     Hemorrhoids     High blood pressure     Osteoarthritis     Pilonidal cyst     Unspecified essential hypertension       Past Surgical History:   Procedure Laterality Date    ELECTROCARDIOGRAM, COMPLETE  06-    Scanned to Media Tab - Date of Service 06-    OTHER SURGICAL HISTORY      laporoscopy    OTHER SURGICAL HISTORY      amniocentesis    SKIN SURGERY      excision of pilonidal cyst X2`    SKIN SURGERY      several cyst excision     TONSILLECTOMY      TOTAL HIP REPLACEMENT        Family History   Problem Relation Age of Onset    Heart Disease Father         CAD (cause of death)    Cancer Father         colon    Cancer Mother         skin    Other (Other) Mother         Aortic aneurysm (cause of death)    Cancer Brother       Social History     Socioeconomic History    Marital status:    Tobacco Use    Smoking status: Former     Packs/day: 1.00     Years: 17.00     Additional pack years: 0.00     Total pack years: 17.00     Types: Cigarettes     Quit date: 2006     Years since quittin.6    Smokeless tobacco: Never   Vaping Use    Vaping Use: Never used   Substance and Sexual Activity    Alcohol use: Yes     Alcohol/week: 4.0 standard drinks of alcohol     Types: 4 Cans of beer per week     Comment: occasionally    Drug use: No   Other Topics Concern    Currently spends a great deal of time in the sun No    Past Sunlamp Treatments for Acne No    History of tanning No    Hx of Spending [place] of Time in Sun No    Bad sunburns in the past No    Tanning Salons in the Past Yes     Comment: 20 years ago    Hx of Radiation Treatments No    Regular use of sun block No    Caffeine Concern Yes     Comment: Soda rarely    Exercise No           REVIEW OF SYSTEMS:     Denies nause, fever, chills  No calf pain  Denies chest pain or SOB      EXAM:   There were no vitals taken for this visit. GENERAL: well developed, well nourished, in no apparent distress  EXTREMITIES:   1. Integument: Normal skin temperature and turgor. [place] like plantar skin distribution. Toenails x10 are elongated, thickened and discolored with subungal derbi. 2. Vascular: Dorsalis pedis two out of four bilateral and posterior tibial pulses two out of   four bilateral, capillary refill normal.   3. Musculoskeletal: All muscle groups are graded 5 out of 5 in the foot and ankle.    4. Neurological: Normal sharp dull sensation; reflexes normal.             ASSESSMENT AND PLAN:   Diagnoses and all orders for this visit:    Pain in toes of both feet    Gait instability    Onychomycosis    Tinea pedis of both feet        Plan:       -Patient examined, chart history reviewed. -Discussed importance of proper pedal hygiene, regular foot checks, and tight glucose control.  -Sharply debrided nails x10 with a sterile nail nipper achieving a 20% reduction in thickness and length, without incident.   -Ambulate with supportive shoes and inserts and avoid walking barefoot.  -Educated patient on acute signs of infection advised patient to seek immediate medical attention if symptoms arise. RTC in 9-10 weeks    The patient indicates understanding of these issues and agrees to the plan.         Marilee Elizondo DPM

## 2023-12-01 ENCOUNTER — HOSPITAL ENCOUNTER (OUTPATIENT)
Dept: GENERAL RADIOLOGY | Age: 63
Discharge: HOME | End: 2023-12-01
Attending: FAMILY MEDICINE
Payer: COMMERCIAL

## 2023-12-01 ENCOUNTER — OFFICE VISIT (OUTPATIENT)
Dept: FAMILY MEDICINE CLINIC | Facility: CLINIC | Age: 63
End: 2023-12-01

## 2023-12-01 VITALS
HEART RATE: 93 BPM | WEIGHT: 235.5 LBS | SYSTOLIC BLOOD PRESSURE: 112 MMHG | BODY MASS INDEX: 36.96 KG/M2 | HEIGHT: 67 IN | DIASTOLIC BLOOD PRESSURE: 78 MMHG | TEMPERATURE: 97 F

## 2023-12-01 DIAGNOSIS — K21.9 GASTROESOPHAGEAL REFLUX DISEASE, UNSPECIFIED WHETHER ESOPHAGITIS PRESENT: ICD-10-CM

## 2023-12-01 DIAGNOSIS — R05.1 ACUTE COUGH: ICD-10-CM

## 2023-12-01 DIAGNOSIS — Z00.00 ADULT GENERAL MEDICAL EXAM: Primary | ICD-10-CM

## 2023-12-01 DIAGNOSIS — I10 ESSENTIAL HYPERTENSION, BENIGN: ICD-10-CM

## 2023-12-01 DIAGNOSIS — Z12.4 CERVICAL CANCER SCREENING: ICD-10-CM

## 2023-12-01 DIAGNOSIS — E78.2 MIXED HYPERLIPIDEMIA: ICD-10-CM

## 2023-12-01 DIAGNOSIS — Z12.31 VISIT FOR SCREENING MAMMOGRAM: ICD-10-CM

## 2023-12-01 DIAGNOSIS — Z12.11 SCREENING FOR COLON CANCER: ICD-10-CM

## 2023-12-01 PROCEDURE — 71046 X-RAY EXAM CHEST 2 VIEWS: CPT | Performed by: FAMILY MEDICINE

## 2023-12-01 RX ORDER — ALBUTEROL SULFATE 90 UG/1
2 AEROSOL, METERED RESPIRATORY (INHALATION) 3 TIMES DAILY PRN
Qty: 1 EACH | Refills: 0 | Status: SHIPPED | OUTPATIENT
Start: 2023-12-01

## 2023-12-01 RX ORDER — BENZONATATE 200 MG/1
200 CAPSULE ORAL 3 TIMES DAILY PRN
Qty: 30 CAPSULE | Refills: 0 | Status: SHIPPED | OUTPATIENT
Start: 2023-12-01 | End: 2023-12-11

## 2024-02-09 ENCOUNTER — PATIENT MESSAGE (OUTPATIENT)
Dept: FAMILY MEDICINE CLINIC | Facility: CLINIC | Age: 64
End: 2024-02-09

## 2024-02-09 NOTE — TELEPHONE ENCOUNTER
From: Vinita Booth  To: Chandrakant Kelley  Sent: 2024 8:35 AM CST  Subject: Prescription    Dr. Kelley,    I have reached out to you on my chart on my 's chart letting you know about his hospital status....  Eriberto Leobardo,  [birth year].    He is not doing well, and conversations of hospice has been provided. I am going through a devastating time right now in my life. Thank goodness for my daughter Viola and family. In the past I believe you or another provider has had me on a RX to help cope with depression. I am worried that the depression may happen to me as the days go on and I am beginning to feel the sadness taking over. I would appreciate your advise on this matter and if I can get something for this. At this time it will be difficult to actually see you in person. Please let me know.    Gunnar Booth

## 2024-02-19 ENCOUNTER — NURSE TRIAGE (OUTPATIENT)
Dept: FAMILY MEDICINE CLINIC | Facility: CLINIC | Age: 64
End: 2024-02-19

## 2024-02-19 NOTE — TELEPHONE ENCOUNTER
Action Requested: Summary for Provider     []  Critical Lab, Recommendations Needed  [] Need Additional Advice  []   FYI    []   Need Orders  [] Need Medications Sent to Pharmacy  []  Other     SUMMARY: Per protocol, patient should be seen in office within 3 days.     Future Appointments   Date Time Provider Department Center   2/20/2024  2:40 PM Darin Benites APRN [facility]   3/18/2024 11:15 AM MENDOZA, PROCEDURE [facility] None       Reason for call: Depression  Onset: Data Unavailable    Spoke to patient. Her  passed away on 2/9/24 and she is very depressed. She was tearful on call. She has been on medication in the past and thinks she needs to be on something now. She doesn't want to, \"spiral into a black hole.\" She feels safe. She has no suicidal thoughts.     Rn relayed this is all very normal during the grief process and it is ok to need some help. Offered appointment to discuss medications- short/long acting medication and is open to grief counseling or therapy.     Dr. Kelley, patient wanted to make you aware she was seeing Darin tomorrow. RN offered an appointment with you but she was unable to make that time. No action needed. Just FYI.     Reason for Disposition   Symptoms interfere with work or school    Protocols used: Depression-A-[place]

## 2024-02-19 NOTE — PROGRESS NOTES
Subjective:   Vinita Booth is a 63 year old female who presents for Anxiety (Patient states that she has been experiencing a lot of anxiety since her spouse passed on February 9, 24. Would like to discuss medication and possible therapy. )   Patient is a pleasant 63-year-old female with a past medical history consistent for anxiety, OA, hypertension, and hyperlipidemia.  Patient presents to office today to discuss options for treatment of depression.  Patient recently lost her  and is having a hard time.  Patient states  was in hospice and passed on 02/09/24. ED on January 25th, stomach cancer, stage 4 with mets to liver. Had lost a lot of blood, went into renal failure. They were together for 44 years,  33. They have one daughter Viola. Leaving for [facility] tonight with BF from [facility] present. Will be back next Tuesday,     Has taken paxil in the past. This works for her.    Went back to work today. Worked from home.           Past Medical History:   Diagnosis Date    Anxiety state     Back problem     Constipation     Dermatological disorder     per [employer]:  \"removal of cyst under the chin\"    Dermatological disorder     per Next[employer]:  \"removal of cyst under left arm\"    Diverticular disease     Hemorrhoids     High blood pressure     Osteoarthritis     Pilonidal cyst     Unspecified essential hypertension       Past Surgical History:   Procedure Laterality Date    Electrocardiogram, complete  06-    Scanned to Media Tab - Date of Service 06-    Other surgical history      laporoscopy    Other surgical history      amniocentesis    Skin surgery      excision of pilonidal cyst X2`    Skin surgery      several cyst excision     Tonsillectomy      Total hip replacement          History/Other:    Chief Complaint Reviewed and Verified  Nursing Notes Reviewed and   Verified  Tobacco Reviewed  Allergies Reviewed  Medications Reviewed    Problem List Reviewed  Medical History  Reviewed  Surgical History   Reviewed  Family History Reviewed  Social History Reviewed         Tobacco:  She smoked tobacco in the past but quit greater than 12 months ago.  Social History    Tobacco Use      Smoking status: Former        Packs/day: 1.00        Years: 17.00        Additional pack years: 0.00        Total pack years: 17.00        Types: Cigarettes        Quit date: 2006        Years since quittin.9      Smokeless tobacco: Never       Current Outpatient Medications   Medication Sig Dispense Refill    Omeprazole 40 MG Oral Capsule Delayed Release Take 1 capsule (40 mg total) by mouth daily. Take 1 capsule by mouth daily before breakfast. 90 capsule 4    telmisartan 80 MG Oral Tab Take 1 tablet (80 mg total) by mouth daily. For high blood pressure. 90 tablet 1    hydroCHLOROthiazide 12.5 MG Oral Cap Take 1 capsule (12.5 mg total) by mouth daily. For high blood pressure. 90 capsule 2    albuterol 108 (90 Base) MCG/ACT Inhalation Aero Soln Inhale 2 puffs into the lungs 3 (three) times daily as needed for Wheezing or Shortness of Breath. (Patient not taking: Reported on 2024) 1 each 0    clotrimazole-betamethasone 1-0.05 % External Cream Apply 1 Application topically 2 (two) times daily. (Patient not taking: Reported on 2024) 45 g 3    Na Sulfate-K Sulfate-Mg Sulf (SUPREP BOWEL PREP KIT) 17.5-3.13-1.6 GM/177ML Oral Solution Take prep as directed by gastro office. May substitute with Trilyte/generic equivalent if needed. (Patient not taking: Reported on 2024) 1 each 0         Review of Systems:  Review of Systems   Constitutional:  Positive for appetite change.   Psychiatric/Behavioral:  Positive for decreased concentration, dysphoric mood and sleep disturbance. Negative for hallucinations, self-injury and suicidal ideas. The patient is not nervous/anxious and is not hyperactive.          Objective:   /85   Pulse 91   Temp 98.2 °F (36.8 °C) (Temporal)   Ht 5' 7\" (1.702  m)   Wt 231 lb (104.8 kg)   BMI 36.18 kg/m²  Estimated body mass index is 36.18 kg/m² as calculated from the following:    Height as of this encounter: 5' 7\" (1.702 m).    Weight as of this encounter: 231 lb (104.8 kg).  Physical Exam  Vitals and nursing note reviewed.   Constitutional:       Appearance: Normal appearance. She is normal weight.   HENT:      Head: Normocephalic.      Right Ear: External ear normal.      Left Ear: External ear normal.   Neurological:      Mental Status: She is alert and oriented to person, place, and time.   Psychiatric:      Comments: Tearful  Sad  Anxious at times.           Assessment & Plan:   1. Depression, unspecified depression type (Primary)  Assessment & Plan:  Loss of   Does not want to go back to deep depression  Paxil has worked in past.  Start 20 mg paxil q am follow up 2 weeks for titration and check in  Atarax rescue at this time tid prn for anxiety.   consultation.  Daughter out of town, suggested support groups and grief counseling.  She has received calls from hospital, but does not want care there at this time.  referral in [facility] office.   2. Grief  Assessment & Plan:   consult  Start paxil, atarax as needed.   3. Anxiety  Assessment & Plan:  Loss of   Does not want to go back to deep depression  Paxil has worked in past.  Start 20 mg paxil q am follow up 2 weeks for titration and check in  Atarax rescue at this time tid prn.  4. Moderate episode of recurrent major depressive disorder (HCC)  Assessment & Plan:  Loss of   Does not want to go back to deep depression  Paxil has worked in past.  Start 20 mg paxil q am follow up 2 weeks for titration and check in  Atarax rescue at this time tid prn for anxiety.   consultation.  Daughter out of town, suggested support groups and grief counseling.  She has received calls from hospital, but does not want care there at this time.  referral in [facility] office.         No follow-ups on  file.    Darin Benites, ANDREINA, 2/19/2024, 2:58 PM

## 2024-02-20 ENCOUNTER — OFFICE VISIT (OUTPATIENT)
Dept: FAMILY MEDICINE CLINIC | Facility: CLINIC | Age: 64
End: 2024-02-20

## 2024-02-20 VITALS
HEART RATE: 91 BPM | HEIGHT: 67 IN | SYSTOLIC BLOOD PRESSURE: 126 MMHG | TEMPERATURE: 98 F | DIASTOLIC BLOOD PRESSURE: 85 MMHG | WEIGHT: 231 LBS | BODY MASS INDEX: 36.26 KG/M2

## 2024-02-20 DIAGNOSIS — F32.A DEPRESSION, UNSPECIFIED DEPRESSION TYPE: Primary | ICD-10-CM

## 2024-02-20 DIAGNOSIS — F33.1 MODERATE EPISODE OF RECURRENT MAJOR DEPRESSIVE DISORDER (HCC): ICD-10-CM

## 2024-02-20 DIAGNOSIS — F41.9 ANXIETY: ICD-10-CM

## 2024-02-20 DIAGNOSIS — F43.21 GRIEF: ICD-10-CM

## 2024-02-20 PROCEDURE — 3074F SYST BP LT 130 MM HG: CPT

## 2024-02-20 PROCEDURE — 3079F DIAST BP 80-89 MM HG: CPT

## 2024-02-20 PROCEDURE — 3008F BODY MASS INDEX DOCD: CPT

## 2024-02-20 PROCEDURE — 99213 OFFICE O/P EST LOW 20 MIN: CPT

## 2024-02-20 RX ORDER — PAROXETINE 10 MG/1
20 TABLET, FILM COATED ORAL EVERY MORNING
Qty: 60 TABLET | Refills: 1 | Status: SHIPPED | OUTPATIENT
Start: 2024-02-20

## 2024-02-20 RX ORDER — HYDROXYZINE HYDROCHLORIDE 25 MG/1
25 TABLET, FILM COATED ORAL EVERY 8 HOURS PRN
Qty: 30 TABLET | Refills: 0 | Status: SHIPPED | OUTPATIENT
Start: 2024-02-20 | End: 2024-02-26

## 2024-03-01 ENCOUNTER — OFFICE VISIT (OUTPATIENT)
Dept: PODIATRY CLINIC | Facility: CLINIC | Age: 64
End: 2024-03-01

## 2024-03-01 DIAGNOSIS — R26.81 GAIT INSTABILITY: Primary | ICD-10-CM

## 2024-03-01 DIAGNOSIS — B35.1 ONYCHOMYCOSIS: ICD-10-CM

## 2024-03-01 PROCEDURE — 99213 OFFICE O/P EST LOW 20 MIN: CPT | Performed by: STUDENT IN AN ORGANIZED HEALTH CARE EDUCATION/TRAINING PROGRAM

## 2024-03-01 NOTE — PROGRESS NOTES
[facility] Podiatry  Progress Note      Vinita Booth is a 63 year old female.   Chief Complaint   Patient presents with    Toenail Care      Pt here for toenail care - No pain or concerns               HPI:       Patient is a pleasant 63-year-old female who presents to clinic for bilateral foot evaluation. Pt admits to losing her  3 weeks ago and is currently grieving.   Allergies: Patient has no known allergies.    Current Outpatient Medications   Medication Sig Dispense Refill    ALPRAZolam (XANAX) 0.25 MG Oral Tab Take 1 tablet (0.25 mg total) by mouth 2 (two) times daily as needed for Anxiety. 16 tablet 0    PARoxetine (PAXIL) 10 MG Oral Tab Take 2 tablets (20 mg total) by mouth every morning. 60 tablet 1    albuterol 108 (90 Base) MCG/ACT Inhalation Aero Soln Inhale 2 puffs into the lungs 3 (three) times daily as needed for Wheezing or Shortness of Breath. (Patient not taking: Reported on 2/20/2024) 1 each 0    clotrimazole-betamethasone 1-0.05 % External Cream Apply 1 Application topically 2 (two) times daily. (Patient not taking: Reported on 2/20/2024) 45 g 3    Omeprazole 40 MG Oral Capsule Delayed Release Take 1 capsule (40 mg total) by mouth daily. Take 1 capsule by mouth daily before breakfast. 90 capsule 4    Na Sulfate-K Sulfate-Mg Sulf (SUPREP BOWEL PREP KIT) 17.5-3.13-1.6 GM/177ML Oral Solution Take prep as directed by gastro office. May substitute with Trilyte/generic equivalent if needed. (Patient not taking: Reported on 2/20/2024) 1 each 0    telmisartan 80 MG Oral Tab Take 1 tablet (80 mg total) by mouth daily. For high blood pressure. 90 tablet 1    hydroCHLOROthiazide 12.5 MG Oral Cap Take 1 capsule (12.5 mg total) by mouth daily. For high blood pressure. 90 capsule 2      Past Medical History:   Diagnosis Date    Anxiety state     Back problem     Constipation     Dermatological disorder     per [employer]:  \"removal of cyst under the chin\"    Dermatological disorder     per  NextGen:  \"removal of cyst under left arm\"    Diverticular disease     Hemorrhoids     High blood pressure     Osteoarthritis     Pilonidal cyst     Unspecified essential hypertension       Past Surgical History:   Procedure Laterality Date    ELECTROCARDIOGRAM, COMPLETE  2014    Scanned to Media Tab - Date of Service 2014    OTHER SURGICAL HISTORY      laporoscopy    OTHER SURGICAL HISTORY      amniocentesis    SKIN SURGERY      excision of pilonidal cyst X2`    SKIN SURGERY      several cyst excision     TONSILLECTOMY      TOTAL HIP REPLACEMENT        Family History   Problem Relation Age of Onset    Heart Disease Father         CAD (cause of death)    Cancer Father         colon    Cancer Mother         skin    Other (Other) Mother         Aortic aneurysm (cause of death)    Cancer Brother       Social History     Socioeconomic History    Marital status:    Tobacco Use    Smoking status: Former     Packs/day: 1.00     Years: 17.00     Additional pack years: 0.00     Total pack years: 17.00     Types: Cigarettes     Quit date: 2006     Years since quittin.9    Smokeless tobacco: Never   Vaping Use    Vaping Use: Never used   Substance and Sexual Activity    Alcohol use: Yes     Alcohol/week: 4.0 standard drinks of alcohol     Types: 4 Cans of beer per week     Comment: occasionally    Drug use: No   Other Topics Concern    Currently spends a great deal of time in the sun No    Past Sunlamp Treatments for Acne No    History of tanning No    Hx of Spending [place] of Time in Sun No    Bad sunburns in the past No    Tanning Salons in the Past Yes     Comment: 20 years ago    Hx of Radiation Treatments No    Regular use of sun block No    Caffeine Concern Yes     Comment: Soda rarely    Exercise No           REVIEW OF SYSTEMS:     Denies nause, fever, chills  No calf pain  Denies chest pain or SOB      EXAM:   There were no vitals taken for this visit.  GENERAL: well developed, well  nourished, in no apparent distress  EXTREMITIES:   1. Integument: Normal skin temperature and turgor. Toenails x10 are elongated, thickened and discolored with subungal derbi.     2. Vascular: Dorsalis pedis two out of four bilateral and posterior tibial pulses two out of   four bilateral, capillary refill normal.   3. Musculoskeletal: All muscle groups are graded 5 out of 5 in the foot and ankle.   4. Neurological: Normal sharp dull sensation; reflexes normal.             ASSESSMENT AND PLAN:   Diagnoses and all orders for this visit:    Gait instability    Onychomycosis        Plan:         -Patient examined, chart history reviewed.  -Discussed importance of proper pedal hygiene, regular foot checks, and tight glucose control.  -Sharply debrided nails x10 with a sterile nail nipper achieving a 20% reduction in thickness and length, without incident.   -Ambulate with supportive shoes and inserts and avoid walking barefoot.  -Educated patient on acute signs of infection advised patient to seek immediate medical attention if symptoms arise.    RTC in 3 months     The patient indicates understanding of these issues and agrees to the plan.        Roxann Huynh DPM

## 2024-03-13 ENCOUNTER — PATIENT MESSAGE (OUTPATIENT)
Dept: FAMILY MEDICINE CLINIC | Facility: CLINIC | Age: 64
End: 2024-03-13

## 2024-03-13 ENCOUNTER — TELEPHONE (OUTPATIENT)
Facility: CLINIC | Age: 64
End: 2024-03-13

## 2024-03-13 NOTE — TELEPHONE ENCOUNTER
Awaiting response from PPD.  I told her in MyChart message that I would let her know as soon as I get an update.

## 2024-03-13 NOTE — TELEPHONE ENCOUNTER
PPD Team:    Patient is scheduled for a colonoscopy and EGD with Dr. Carreno on Monday 3/18/24  She sent below Navita message, case still marked as Open.    Please let me know once we get response from insurance about PA.    Thank you    From: Vinita Booth  To: Tigist Martinez  Sent: 3/13/2024 12:03 PM CDT  Subject: Pre - Certification    Do I need a Pre- Certification  from Insurance?  My Colonoscopy Appt is Monday, March 18.    Gunnar Booth

## 2024-03-14 ENCOUNTER — PATIENT MESSAGE (OUTPATIENT)
Facility: CLINIC | Age: 64
End: 2024-03-14

## 2024-03-14 NOTE — TELEPHONE ENCOUNTER
From: Vinita Booth  To: Tigist Martinez  Sent: 3/14/2024 10:02 AM CDT  Subject: Prescription Meds    I did not get instructions on taking my meds during the Monday, March 18th procedure. Do I stop the day before ?    Please let me know.    Also, the Pre Cert has not been approved yet for the procedure.    Vinita Booth  [phone]

## 2024-03-14 NOTE — TELEPHONE ENCOUNTER
Patient returning call made pt aware requesting call back from RN. Patient calling to ask as well what medication to take and not take prior. Please call.

## 2024-03-14 NOTE — TELEPHONE ENCOUNTER
I called the patient.  I let her know I spoke to Fina with PPD and case pending-hopeful to have a response soon.  I reviewed all of her medications.  She is not on any blood thinners, DM medications or weight loss medications.  Reviewed if she is on any vitamins or supplements to hold those 7 days prior.  She voiced understanding.    I told her I would let her know about PA once I get any response.

## 2024-03-15 ENCOUNTER — PATIENT MESSAGE (OUTPATIENT)
Facility: CLINIC | Age: 64
End: 2024-03-15

## 2024-03-15 NOTE — TELEPHONE ENCOUNTER
Fina Morris2 minutes ago (2:20 PM)     MA  Just spoke with [employer] and pt's case has been approved.

## 2024-03-15 NOTE — TELEPHONE ENCOUNTER
Called [employer]aki at [phone], spoke with Margoth, case is pending review, clinicals have been received, she will escalate case for urgent nurse review.

## 2024-03-15 NOTE — TELEPHONE ENCOUNTER
Pt called again and is anxious and would like to know if this procedure will be approved.  Please call.

## 2024-03-15 NOTE — TELEPHONE ENCOUNTER
From: Vinita Booth  To: Tigist Martinez  Sent: 3/15/2024 1:51 PM CDT  Subject: Pre Cert ? !    Has my Pre Cert been approved for Monday's Procedure?

## 2024-03-15 NOTE — TELEPHONE ENCOUNTER
Please see telephone encounter.  Per Fina with PPD case is approved and I called the patient and let her know.

## 2024-03-18 ENCOUNTER — ANESTHESIA (OUTPATIENT)
Dept: ENDOSCOPY | Age: 64
End: 2024-03-18
Payer: COMMERCIAL

## 2024-03-18 ENCOUNTER — HOSPITAL ENCOUNTER (OUTPATIENT)
Age: 64
Setting detail: HOSPITAL OUTPATIENT SURGERY
Discharge: HOME | End: 2024-03-18
Attending: INTERNAL MEDICINE | Admitting: INTERNAL MEDICINE
Payer: COMMERCIAL

## 2024-03-18 ENCOUNTER — ANESTHESIA EVENT (OUTPATIENT)
Dept: ENDOSCOPY | Age: 64
End: 2024-03-18
Payer: COMMERCIAL

## 2024-03-18 VITALS
HEART RATE: 78 BPM | RESPIRATION RATE: 16 BRPM | DIASTOLIC BLOOD PRESSURE: 76 MMHG | WEIGHT: 220 LBS | BODY MASS INDEX: 34.53 KG/M2 | HEIGHT: 67 IN | SYSTOLIC BLOOD PRESSURE: 110 MMHG | OXYGEN SATURATION: 97 %

## 2024-03-18 DIAGNOSIS — Z87.19 HISTORY OF DIVERTICULITIS: ICD-10-CM

## 2024-03-18 DIAGNOSIS — K21.9 GASTROESOPHAGEAL REFLUX DISEASE, UNSPECIFIED WHETHER ESOPHAGITIS PRESENT: ICD-10-CM

## 2024-03-18 DIAGNOSIS — Z12.11 SCREEN FOR COLON CANCER: ICD-10-CM

## 2024-03-18 PROCEDURE — 88305 TISSUE EXAM BY PATHOLOGIST: CPT | Performed by: INTERNAL MEDICINE

## 2024-03-18 PROCEDURE — 88312 SPECIAL STAINS GROUP 1: CPT | Performed by: INTERNAL MEDICINE

## 2024-03-18 DEVICE — REPLAY HEMOSTASIS CLIP, 11MM SPAN
Type: IMPLANTABLE DEVICE | Status: FUNCTIONAL
Brand: REPLAY

## 2024-03-18 RX ORDER — EPHEDRINE SULFATE 50 MG/ML
INJECTION INTRAVENOUS AS NEEDED
Status: DISCONTINUED | OUTPATIENT
Start: 2024-03-18 | End: 2024-03-18 | Stop reason: SURG

## 2024-03-18 RX ORDER — NALOXONE HYDROCHLORIDE 0.4 MG/ML
0.08 INJECTION, SOLUTION INTRAMUSCULAR; INTRAVENOUS; SUBCUTANEOUS ONCE AS NEEDED
OUTPATIENT
Start: 2024-03-18 | End: 2024-03-18

## 2024-03-18 RX ORDER — SODIUM CHLORIDE, SODIUM LACTATE, POTASSIUM CHLORIDE, CALCIUM CHLORIDE 600; 310; 30; 20 MG/100ML; MG/100ML; MG/100ML; MG/100ML
INJECTION, SOLUTION INTRAVENOUS CONTINUOUS
Status: DISCONTINUED | OUTPATIENT
Start: 2024-03-18 | End: 2024-03-18

## 2024-03-18 RX ORDER — LIDOCAINE HYDROCHLORIDE 10 MG/ML
INJECTION, SOLUTION EPIDURAL; INFILTRATION; INTRACAUDAL; PERINEURAL AS NEEDED
Status: DISCONTINUED | OUTPATIENT
Start: 2024-03-18 | End: 2024-03-18 | Stop reason: SURG

## 2024-03-18 RX ORDER — SODIUM CHLORIDE, SODIUM LACTATE, POTASSIUM CHLORIDE, CALCIUM CHLORIDE 600; 310; 30; 20 MG/100ML; MG/100ML; MG/100ML; MG/100ML
INJECTION, SOLUTION INTRAVENOUS CONTINUOUS
OUTPATIENT
Start: 2024-03-18

## 2024-03-18 RX ADMIN — EPHEDRINE SULFATE 10 MG: 50 INJECTION INTRAVENOUS at 11:30:00

## 2024-03-18 RX ADMIN — EPHEDRINE SULFATE 10 MG: 50 INJECTION INTRAVENOUS at 11:18:00

## 2024-03-18 RX ADMIN — SODIUM CHLORIDE, SODIUM LACTATE, POTASSIUM CHLORIDE, CALCIUM CHLORIDE: 600; 310; 30; 20 INJECTION, SOLUTION INTRAVENOUS at 11:50:00

## 2024-03-18 RX ADMIN — LIDOCAINE HYDROCHLORIDE 25 MG: 10 INJECTION, SOLUTION EPIDURAL; INFILTRATION; INTRACAUDAL; PERINEURAL at 11:00:00

## 2024-03-18 NOTE — DISCHARGE INSTRUCTIONS
Home Care Instructions for Colonoscopy and/or Gastroscopy with Sedation    Diet:  - Resume your regular diet as tolerated unless otherwise instructed.  - Start with light meals to minimize bloating.  - Do not drink alcohol today.    Medication:  - If you have questions about resuming your normal medications, please contact your Primary Care Physician.    Activities:  - Take it easy today. Do not return to work today.  - Do not drive today.  - Do not operate any machinery today (including kitchen equipment).    Colonoscopy:  - You may notice some rectal \"spotting\" (a little blood on the toilet tissue) for a day or two after the exam. This is normal.  - If you experience any rectal bleeding (not spotting), persistent tenderness or sharp severe abdominal pains, oral temperature over 100 degrees Fahrenheit, light-headedness or dizziness, or any other problems, contact your doctor.    Gastroscopy:  - You may have a sore throat for 2-3 days following the exam. This is normal. Gargling with warm salt water (1/2 tsp salt to 1 glass warm water) or using throat lozenges will help.  - If you experience any sharp pain in your neck, abdomen or chest, vomiting of blood, oral temperature over 100 degrees Fahrenheit, light-headedness or dizziness, or any other problems, contact your doctor.    **If unable to reach your doctor, please go to the [facility] Emergency Room**    - Your referring physician will receive a full report of your examination.  - If you do not hear from your doctor's office within two weeks of your biopsy, please call them for your results.    You may be able to see your laboratory results in [employer] between 4 and 7 business days.  In some cases, your physician may not have viewed the results before they are released to [employer].  If you have questions regarding your results contact the physician who ordered the test/exam by phone or via [employer] by choosing \"Ask a Medical Question.\"

## 2024-03-18 NOTE — ANESTHESIA POSTPROCEDURE EVALUATION
Patient: Vinita Booth    Procedure Summary       Date: 03/18/24 Room / Location: [facility] ENDOSCOPY 01 / [facility] ENDO    Anesthesia Start: 1057 Anesthesia Stop: 1151    Procedures:       COLONOSCOPY      ESOPHAGOGASTRODUODENOSCOPY (EGD) Diagnosis:       Screen for colon cancer      History of diverticulitis      Gastroesophageal reflux disease, unspecified whether esophagitis present      (colon polyps, hemorrhoids, diverticulosis, hiatal hernia, irregular z line)    Surgeons: Darin Carreno MD Anesthesiologist: Belle Luu MD    Anesthesia Type: MAC ASA Status: 2            Anesthesia Type: MAC    Vitals Value Taken Time   /87 03/18/24 1150   Temp 09 03/18/24 1151   Pulse 104 03/18/24 1151   Resp 16 03/18/24 1151   SpO2 98 03/18/24 1151   Vitals shown include unfiled device data.    [facility] AN Post Evaluation:   Patient Evaluated in PACU  Patient Participation: complete - patient participated  Level of Consciousness: awake  Pain Management: adequate  Airway Patency:patent  Dental exam unchanged from preop  Yes    Cardiovascular Status: acceptable  Respiratory Status: acceptable  Postoperative Hydration acceptable      BELLE LUU MD  3/18/2024 11:51 AM

## 2024-03-18 NOTE — OPERATIVE REPORT
[facility] Endoscopy Report  Date of procedure-March 18, 2024    Preoperative Diagnosis:  -Colorectal cancer screening  -History diverticulitis  -GERD      Postoperative Diagnosis:  -Colon polyps x 5  -Diverticular disease  -Internal hemorrhoids  -Hiatal hernia 4 cm  -Irregular Z-line      Procedure:    Colonoscopy   Esophagogastroduodenoscopy       Surgeon:  Darin Carreno M.D.    Anesthesia:  MAC     Technique:  After informed consent, the patient was placed in the left lateral recumbent position.  Digital rectal examination revealed no palpable intraluminal abnormalities.  An Olympus variable stiffness 190 series HD colonoscope was inserted into the rectum and advanced under direct vision by following the lumen to the cecum.  The colon was examined upon withdrawal in the left lateral position.    Following colonoscopy, an Olympus adult HD gastroscope was inserted into the hypopharynx and advanced under direct vision into the esophagus, stomach and duodenum.  The endoscope was withdrawn to the stomach where retroflexion of the annulus, body, cardia and fundus was performed.  The instrument was straightened, insufflated air and fluid were suctioned and the endoscope was withdrawn.  The procedures were well tolerated without immediate complication.      Findings:  The preparation of the colon was good.  The terminal ileum was examined for 4 cm and visually normal.  The ileocecal valve was well preserved. The visualized colonic mucosa from the cecum to the anal verge was normal with an intact vascular pattern.    Colon polyps x 5 removed as follows;  -Proximal transverse x 1, serrated adenomatous appearing sessile 2 cm polyp which was removed by hot snare and 3 passes, this was lifted before snared with Eleview 2 mL injected underneath to lift the lesion.  Endoclips x 5 placed across the mucosal defect with excellent result.    -Rectosigmoid x 4, these had hyperplastic features and were all diminutive  and removed by cold forceps technique.  All polypectomy sites inspected and found to be free of bleeding and specimens retrieved and sent for analysis.    Diverticulosis located in the sigmoid colon and scattered rare diverticula were noted in the proximal colon/ascending region, no evidence of diverticulitis.    Small internal hemorrhoids noted on retroflexed view.    The esophagus showed an irregular Z-line at the GE junction, biopsies taken to rule out Warner's..  The GE junction and diaphragmatic impression were at 36 cm and 40 cm for 4 cm hiatal hernia, no Rashawn's erosions noted..  The stomach distended appropriately with insufflated air.  The mucosa of the stomach including cardia, fundus, gastric body and antrum were normal.  The duodenal bulb and post bulbar regions were normal.    Estimated blood loss-insignificant  Specimens-see above    Impression:  -Colon polyps x 5  -Diverticular disease  -Internal hemorrhoids  -Hiatal hernia 4 cm  -Irregular Z-line    Recommendations:  - Post polypectomy instructions given  - Repeat colonoscopy in 1 year to follow up polypectomy site transverse colon  - High fiber diet for diverticular disease  - Symptomatic treatment of hemorrhoids  - Antireflux dietary and behavioral changes for her hiatal hernia, PPI therapy.  - Follow-up on biopsy results of the GE junction to rule out Warner's          Darin Carreno MD  3/18/2024  11:47 AM

## 2024-03-18 NOTE — H&P
History & Physical Examination    Patient Name: Vinita Booth  MRN: [id number]  [facility]: [id number]  YOB: 1960    Diagnosis:   CRC screening  Diverticulitis history   GERD        Medications Prior to Admission   Medication Sig Dispense Refill Last Dose    [] ALPRAZolam (XANAX) 0.25 MG Oral Tab Take 1 tablet (0.25 mg total) by mouth 2 (two) times daily as needed for Anxiety. 16 tablet 0     PARoxetine (PAXIL) 10 MG Oral Tab Take 2 tablets (20 mg total) by mouth every morning. 60 tablet 1 3/18/2024    albuterol 108 (90 Base) MCG/ACT Inhalation Aero Soln Inhale 2 puffs into the lungs 3 (three) times daily as needed for Wheezing or Shortness of Breath. 1 each 0  at prn    Omeprazole 40 MG Oral Capsule Delayed Release Take 1 capsule (40 mg total) by mouth daily. Take 1 capsule by mouth daily before breakfast. 90 capsule 4 3/17/2024    telmisartan 80 MG Oral Tab Take 1 tablet (80 mg total) by mouth daily. For high blood pressure. 90 tablet 1 3/18/2024    hydroCHLOROthiazide 12.5 MG Oral Cap Take 1 capsule (12.5 mg total) by mouth daily. For high blood pressure. 90 capsule 2 3/18/2024    clotrimazole-betamethasone 1-0.05 % External Cream Apply 1 Application topically 2 (two) times daily. 45 g 3  at prn    Na Sulfate-K Sulfate-Mg Sulf (SUPREP BOWEL PREP KIT) 17.5-3.13-1.6 GM/177ML Oral Solution Take prep as directed by gastro office. May substitute with Trilyte/generic equivalent if needed. (Patient not taking: Reported on 2024) 1 each 0      Current Facility-Administered Medications   Medication Dose Route Frequency    lactated ringers infusion   Intravenous Continuous       Allergies: No Known Allergies    Past Medical History:   Diagnosis Date    Anxiety state     Back problem     Constipation     Dermatological disorder     per [employer]:  \"removal of cyst under the chin\"    Dermatological disorder     per [employer]:  \"removal of cyst under left arm\"    Diverticular disease     Hemorrhoids     High  blood pressure     Osteoarthritis     Pilonidal cyst     Unspecified essential hypertension      Past Surgical History:   Procedure Laterality Date    ELECTROCARDIOGRAM, COMPLETE  2014    Scanned to Media Tab - Date of Service 2014    OTHER SURGICAL HISTORY      laporoscopy    OTHER SURGICAL HISTORY      amniocentesis    SKIN SURGERY      excision of pilonidal cyst X2`    SKIN SURGERY      several cyst excision     TONSILLECTOMY      TOTAL HIP REPLACEMENT       Family History   Problem Relation Age of Onset    Heart Disease Father         CAD (cause of death)    Cancer Father         colon    Cancer Mother         skin    Other (Other) Mother         Aortic aneurysm (cause of death)    Cancer Brother      Social History     Tobacco Use    Smoking status: Former     Packs/day: 1.00     Years: 17.00     Additional pack years: 0.00     Total pack years: 17.00     Types: Cigarettes     Quit date: 2006     Years since quittin.9    Smokeless tobacco: Never   Substance Use Topics    Alcohol use: Yes     Alcohol/week: 4.0 standard drinks of alcohol     Types: 4 Cans of beer per week     Comment: occasionally       SYSTEM Check if Review is Normal Check if Physical Exam is Normal If not normal, please explain:   HEENT [x ] [ x]    NECK & BACK [x ] [x ]    HEART [x ] [ x]    LUNGS [x ] [ x]    ABDOMEN [x ] [x ]    UROGENITAL [ ] [ ]    EXTREMITIES [x ] [x ]    OTHER        [ x ] I have discussed the risks and benefits and alternatives with the patient/family.  They understand and agree to proceed with plan of care.  [ x ] I have reviewed the History and Physical done within the last 30 days.  Any changes noted above.    Darin Carreno MD  3/18/2024  10:42 AM

## 2024-03-19 ENCOUNTER — TELEPHONE (OUTPATIENT)
Facility: CLINIC | Age: 64
End: 2024-03-19

## 2024-03-19 NOTE — TELEPHONE ENCOUNTER
Patient contacted, she is doing great after colonoscopy and EGD.  Findings of colon polyps some of them quite large were reviewed, advised repeat colonoscopy in 1 year to follow-up short-term.  She agrees to this.    We discussed her upper endoscopy which did show sizable hiatal hernia, she is not really symptomatic from this.  There was possible changes of Warner's at the GE junction were no endoscopic evidence of Warner's tongues there was some irregularity at the Z-line.  Biopsies did show intestinal metaplasia and she will stay on omeprazole daily, we discussed Warner's esophagus, the risks preneoplastic state/esophageal cancer.  Repeat upper endoscopy in 1 years time as well with the colonoscopy.

## 2024-03-20 ENCOUNTER — TELEPHONE (OUTPATIENT)
Facility: CLINIC | Age: 64
End: 2024-03-20

## 2024-03-20 NOTE — TELEPHONE ENCOUNTER
----- Message from Darin Carreno MD sent at 3/19/2024  5:32 PM CDT -----  I wanted to get back to you with your colonoscopy and EGD results.      You had 5 colon polyps removed which were benign.  I would advise a repeat colonoscopy in one year to make sure no new polyps are forming.  You also have internal hemorrhoids and diverticulosis.     The upper endoscopy showed a hiatal hernia ( no eating 3 hours before bed) and samples taken from the esophagus were showed some changes of Barretts esophagus.  Please stay on an antacid ( like omeprazole) and we will repeat EGD in 1 year as well to check up on this.     Call with questions.

## 2024-03-20 NOTE — TELEPHONE ENCOUNTER
Seen by patient Vinita RAQUEL Booth on 3/19/2024  5:33 PM     Health maintenance updated. Last colonoscopy/egd done 3/18/24. 1 year recall placed into Pt Outreach, next due on 03/2025 per Dr. Carreno. Specialty comments updated.

## 2024-03-22 ENCOUNTER — TELEPHONE (OUTPATIENT)
Dept: FAMILY MEDICINE CLINIC | Facility: CLINIC | Age: 64
End: 2024-03-22

## 2024-04-19 ENCOUNTER — PATIENT MESSAGE (OUTPATIENT)
Dept: FAMILY MEDICINE CLINIC | Facility: CLINIC | Age: 64
End: 2024-04-19

## 2024-04-19 DIAGNOSIS — F41.9 ANXIETY: ICD-10-CM

## 2024-04-20 DIAGNOSIS — F32.A DEPRESSION, UNSPECIFIED DEPRESSION TYPE: ICD-10-CM

## 2024-04-20 DIAGNOSIS — F43.21 GRIEF: ICD-10-CM

## 2024-04-22 RX ORDER — PAROXETINE 10 MG/1
20 TABLET, FILM COATED ORAL EVERY EVENING
Qty: 180 TABLET | Refills: 3 | Status: SHIPPED | OUTPATIENT
Start: 2024-04-22

## 2024-04-22 NOTE — TELEPHONE ENCOUNTER
Refill passed per [facility] protocol.  Requested Prescriptions   Pending Prescriptions Disp Refills    PAROXETINE 10 MG Oral Tab [Pharmacy Med Name: PAROXETINE 10MG TABLETS] 60 tablet 1     Sig: TAKE 2 TABLETS(20 MG) BY MOUTH EVERY MORNING       Psychiatric Non-Scheduled (Anti-Anxiety) Passed - 4/20/2024  3:08 PM        Passed - In person appointment or virtual visit in the past 6 mos or appointment in next 3 mos     Recent [facility]its              [facility]     [facility]    1 month ago Gait instability    [facility]    Office Visit    2 months ago Depression, unspecified depression type    [facility], Darin Davies APRN    Office Visit    4 months ago Adult general medical exam    [facility]    Office Visit    4 months ago Pain in toes of both feet    [facility] Roxann Huynh DPM    Office Visit          Future Appointments         Provider Department Appt Notes    In 3 weeks Roxann Huynh DPM [facility] the same issue                    Passed - Depression Screening completed within the past 12 months           Recent Outpatient Visits              1 month ago Anxiety    [facility] Chandrakant Whitehead DO    Office Visit    1 month ago Gait instability    [facility] Roxann Huynh DPM    Office Visit    2 months ago Depression, unspecified depression type    [facility]    [facility] Visit    4 months ago Adult general medical exam    [facility]      Office Visit    4 months ago Pain in toes of both feet    [facility], [facility] Roxann Huynh DPM    Office Visit          Future Appointments         Provider Department Appt Notes    In 3 weeks Roxann Huynh DPM [facility], [facility] the same issue             There are no Wet Read(s) to document.

## 2024-04-22 NOTE — TELEPHONE ENCOUNTER
Please review. Protocol Failed; No Protocol    Recent fills: 2/26/2024  Last Rx written: 2/26/2024  LOV: 3/5/2024    Requested Prescriptions   Pending Prescriptions Disp Refills    ALPRAZOLAM 0.25 MG Oral Tab [Pharmacy Med Name: ALPRAZOLAM 0.25MG TABLETS] 16 tablet 0     Sig: TAKE 1 TABLET(0.25 MG) BY MOUTH TWICE DAILY AS NEEDED FOR ANXIETY       Controlled Substance Medication Failed - 4/19/2024 11:38 AM        Failed - This medication is a controlled substance - forward to provider to refill                 Recent Outpatient Visits              1 month ago Anxiety    [facility], Chandrakant Whitehead DO    Office Visit    1 month ago Gait instability    [facility], Roxann Cm DPM    Office Visit    2 months ago Depression, unspecified depression type    [facility], Darin Davies APRN    Office Visit    4 months ago Adult general medical exam    [facility], Chandrakant Whitehead DO    Office Visit    4 months ago [facility] toes [facility] both feet    [facility]    [facility] Visit

## 2024-04-23 RX ORDER — ALPRAZOLAM 0.25 MG/1
0.25 TABLET ORAL 2 TIMES DAILY
Qty: 16 TABLET | Refills: 0 | Status: SHIPPED | OUTPATIENT
Start: 2024-04-23

## 2024-04-26 DIAGNOSIS — I10 ESSENTIAL HYPERTENSION: ICD-10-CM

## 2024-04-28 RX ORDER — TELMISARTAN 80 MG/1
80 TABLET ORAL DAILY
Qty: 90 TABLET | Refills: 3 | Status: SHIPPED | OUTPATIENT
Start: 2024-04-28

## 2024-04-28 NOTE — TELEPHONE ENCOUNTER
Refill Passed per Protocol.     Requested Prescriptions   Pending Prescriptions Disp Refills    TELMISARTAN 80 MG Oral Tab [Pharmacy Med Name: TELMISARTAN 80MG TABLETS] 90 tablet 1     Sig: TAKE 1 TABLET(80 MG) BY MOUTH DAILY FOR HIGH BLOOD PRESSURE       Hypertension Medications Protocol Passed - 4/26/2024 12:08 PM        Passed - CMP or BMP in past 12 months        Passed - Last BP reading less than 140/90     BP Readings from Last 1 Encounters:   03/18/24 110/76               Passed - In person appointment or virtual visit in the past 12 mos or appointment in next 3 mos     Recent [facility]              [facility]     [facility]    1 month ago Gait instability    [facility]    Office Visit    2 months ago Depression, unspecified depression type    [facility]    Office Visit    4 months ago Adult general medical exam    [facility] Chandrakant Whitehead,     Office Visit    5 months ago Pain in toes of both feet    [facility] [place] Roxann Huynh DPM    Office Visit          Future Appointments         Provider Department Appt Notes    In 2 [facility]                    Passed - EGFRCR or GFRNAA > 50     GFR Evaluation  EGFRCR: 73 , resulted on 10/5/2023                Future Appointments         Provider Department Appt Notes    In 2 weeks Roxann Huynh DPM [facility] the same issue          Recent Outpatient Visits              1 month ago An[facility]    [facility]     Office Visit    1 month ago Gait instability     [facility], [facility], Roxann Cm DPM    Office Visit    2 months ago Depression, unspecified depression type    [facility], [facility], Darin Davies APRN    Office Visit    4 months ago Adult general medical exam    [facility], [address], Chandrakant Whitehead DO    Office Visit    5 months ago Pain in toes of both feet    [facility], [facility] [place] Roxann Huynh DPM    Office Visit

## 2024-05-17 ENCOUNTER — OFFICE VISIT (OUTPATIENT)
Dept: PODIATRY CLINIC | Facility: CLINIC | Age: 64
End: 2024-05-17

## 2024-05-17 DIAGNOSIS — R26.81 GAIT INSTABILITY: Primary | ICD-10-CM

## 2024-05-17 DIAGNOSIS — B35.3 TINEA PEDIS OF BOTH FEET: ICD-10-CM

## 2024-05-17 DIAGNOSIS — B35.1 ONYCHOMYCOSIS: ICD-10-CM

## 2024-05-17 PROCEDURE — 99213 OFFICE O/P EST LOW 20 MIN: CPT | Performed by: STUDENT IN AN ORGANIZED HEALTH CARE EDUCATION/TRAINING PROGRAM

## 2024-05-17 NOTE — PROGRESS NOTES
[facility] Podiatry  Progress Note      Vinita Booth is a 63 year old female.   Chief Complaint   Patient presents with    Toenail Care     2 MO f/u - Pt here for nail trim. No pain or concerns.              HPI:     Patient is a pleasant 63-year-old female with gait instability that presents to clinic for bilateral foot evaluation.  Denies any acute signs of infection.  Denies any pain.    Allergies: Patient has no known allergies.    Current Outpatient Medications   Medication Sig Dispense Refill    telmisartan 80 MG Oral Tab Take 1 tablet (80 mg total) by mouth daily. 90 tablet 3    hydroCHLOROthiazide 12.5 MG Oral Cap Take 1 capsule (12.5 mg total) by mouth daily. For high blood pressure. 90 capsule 3    ALPRAZolam 0.25 MG Oral Tab Take 1 tablet (0.25 mg total) by mouth 2 (two) times daily. 16 tablet 0    PARoxetine 10 MG Oral Tab Take 2 tablets (20 mg total) by mouth every evening. 180 tablet 3    albuterol 108 (90 Base) MCG/ACT Inhalation Aero Soln Inhale 2 puffs into the lungs 3 (three) times daily as needed for Wheezing or Shortness of Breath. 1 each 0    clotrimazole-betamethasone 1-0.05 % External Cream Apply 1 Application topically 2 (two) times daily. 45 g 3    Omeprazole 40 MG Oral Capsule Delayed Release Take 1 capsule (40 mg total) by mouth daily. Take 1 capsule by mouth daily before breakfast. 90 capsule 4      Past Medical History:    Anxiety state    Back problem    Constipation    Dermatological disorder    per [employer]:  \"removal of cyst under the chin\"    Dermatological disorder    per [employer]:  \"removal of cyst under left arm\"    Diverticular disease    Hemorrhoids    High blood pressure    Osteoarthritis    Pilonidal cyst    Unspecified essential hypertension      Past Surgical History:   Procedure Laterality Date    Colonoscopy N/A 03/18/2024    COLONOSCOPY with polypectomy, sclerotherapy, cautery and clipping x 5    Colonoscopy N/A 3/18/2024    Procedure: COLONOSCOPY;  Surgeon:  Darin Carreno MD;  Location: [facility] ENDO    Egd N/A 2024    ESOPHAGOGASTRODUODENOSCOPY (EGD) with biopsies    Electrocardiogram, complete  2014    Scanned to Media Tab - Date of Service 2014    Other surgical history      laporoscopy    Other surgical history      amniocentesis    Skin surgery      excision of pilonidal cyst X2`    Skin surgery      several cyst excision     Tonsillectomy      Total hip replacement        Family History   Problem Relation Age of Onset    Heart Disease Father         CAD (cause of death)    Cancer Father         colon    Cancer Mother         skin    Other (Other) Mother         Aortic aneurysm (cause of death)    Cancer Brother       Social History     Socioeconomic History    Marital status:    Tobacco Use    Smoking status: Former     Current packs/day: 0.00     Average packs/day: 1 pack/day for 17.0 years (17.0 ttl pk-yrs)     Types: Cigarettes     Start date: 1989     Quit date: 2006     Years since quittin.1    Smokeless tobacco: Never   Vaping Use    Vaping status: Never Used   Substance and Sexual Activity    Alcohol use: Yes     Alcohol/week: 4.0 standard drinks of alcohol     Types: 4 Cans of beer per week     Comment: occasionally    Drug use: No   Other Topics Concern    Currently spends a great deal of time in the sun No    Past Sunlamp Treatments for Acne No    History of tanning No    Hx of Spending Great Deal of Time in Sun No    Bad sunburns in the past No    Tanning Salons in the Past Yes     Comment: 20 years ago    Hx of Radiation Treatments No    Regular use of sun block No    Caffeine Concern Yes     Comment: Soda rarely    Exercise No           REVIEW OF SYSTEMS:     Denies nause, fever, chills  No calf pain  Denies chest pain or SOB      EXAM:   There were no vitals taken for this visit.  GENERAL: well developed, well nourished, in no apparent distress  EXTREMITIES:   1. Integument: Normal skin temperature and turgor.  Toenails x10 are elongated, thickened and discolored with subungal derbi.     2. Vascular: Dorsalis pedis two out of four bilateral and posterior tibial pulses two out of   four bilateral, capillary refill normal.   3. Musculoskeletal: All muscle groups are graded 5 out of 5 in the foot and ankle.   4. Neurological: Normal sharp dull sensation; reflexes normal.             ASSESSMENT AND PLAN:   Diagnoses and all orders for this visit:    Gait instability    Onychomycosis    Tinea pedis of both feet        Plan:       -Patient examined, chart history reviewed.  -Discussed importance of proper pedal hygiene, regular foot checks, and tight glucose control.  -Sharply debrided nails x10 with a sterile nail nipper achieving a 20% reduction in thickness and length, without incident.   -Ambulate with supportive shoes and inserts and avoid walking barefoot.  -Educated patient on acute signs of infection advised patient to seek immediate medical attention if symptoms arise.    RTC in 3 months     The patient indicates understanding of these issues and agrees to the plan.        Roxann Huynh DPM

## 2024-06-06 ENCOUNTER — TELEPHONE (OUTPATIENT)
Dept: FAMILY MEDICINE CLINIC | Facility: CLINIC | Age: 64
End: 2024-06-06

## 2024-07-17 ENCOUNTER — TELEPHONE (OUTPATIENT)
Dept: FAMILY MEDICINE CLINIC | Facility: CLINIC | Age: 64
End: 2024-07-17

## 2024-07-26 ENCOUNTER — OFFICE VISIT (OUTPATIENT)
Dept: PODIATRY CLINIC | Facility: CLINIC | Age: 64
End: 2024-07-26

## 2024-07-26 DIAGNOSIS — M79.674 PAIN IN TOES OF BOTH FEET: ICD-10-CM

## 2024-07-26 DIAGNOSIS — B35.1 ONYCHOMYCOSIS: ICD-10-CM

## 2024-07-26 DIAGNOSIS — M79.675 PAIN IN TOES OF BOTH FEET: ICD-10-CM

## 2024-07-26 DIAGNOSIS — R26.81 GAIT INSTABILITY: Primary | ICD-10-CM

## 2024-07-26 PROCEDURE — 99213 OFFICE O/P EST LOW 20 MIN: CPT | Performed by: STUDENT IN AN ORGANIZED HEALTH CARE EDUCATION/TRAINING PROGRAM

## 2024-07-26 NOTE — PROGRESS NOTES
[facility] Podiatry  Progress Note      Vinita Booth is a 64 year old female.   Chief Complaint   Patient presents with    Toenail Care     Pt here for nail trim and foot check. Denies any pain or concerns             HPI:     Patient is a pleasant 63-year-old female with gait instability that presents to clinic for bilateral foot evaluation.  Denies any acute signs of infection.  Denies any pain.    Allergies: Patient has no known allergies.    Current Outpatient Medications   Medication Sig Dispense Refill    telmisartan 80 MG Oral Tab Take 1 tablet (80 mg total) by mouth daily. 90 tablet 3    hydroCHLOROthiazide 12.5 MG Oral Cap Take 1 capsule (12.5 mg total) by mouth daily. For high blood pressure. 90 capsule 3    ALPRAZolam 0.25 MG Oral Tab Take 1 tablet (0.25 mg total) by mouth 2 (two) times daily. 16 tablet 0    PARoxetine 10 MG Oral Tab Take 2 tablets (20 mg total) by mouth every evening. 180 tablet 3    albuterol 108 (90 Base) MCG/ACT Inhalation Aero Soln Inhale 2 puffs into the lungs 3 (three) times daily as needed for Wheezing or Shortness of Breath. 1 each 0    clotrimazole-betamethasone 1-0.05 % External Cream Apply 1 Application topically 2 (two) times daily. 45 g 3    Omeprazole 40 MG Oral Capsule Delayed Release Take 1 capsule (40 mg total) by mouth daily. Take 1 capsule by mouth daily before breakfast. 90 capsule 4      Past Medical History:    Anxiety state    Back problem    Constipation    Dermatological disorder    per [employer]:  \"removal of cyst under the chin\"    Dermatological disorder    per [employer]:  \"removal of cyst under left arm\"    Diverticular disease    Hemorrhoids    High blood pressure    Osteoarthritis    Pilonidal cyst    Unspecified essential hypertension      Past Surgical History:   Procedure Laterality Date    Colonoscopy N/A 03/18/2024    COLONOSCOPY with polypectomy, sclerotherapy, cautery and clipping x 5    Colonoscopy N/A 3/18/2024    Procedure: COLONOSCOPY;   Surgeon: Darin Carreno MD;  Location: [facility] ENDO    Egd N/A 2024    ESOPHAGOGASTRODUODENOSCOPY (EGD) with biopsies    Electrocardiogram, complete  2014    Scanned to Media Tab - Date of Service 2014    Other surgical history      laporoscopy    Other surgical history      amniocentesis    Skin surgery      excision of pilonidal cyst X2`    Skin surgery      several cyst excision     Tonsillectomy      Total hip replacement        Family History   Problem Relation Age of Onset    Heart Disease Father         CAD (cause of death)    Cancer Father         colon    Cancer Mother         skin    Other (Other) Mother         Aortic aneurysm (cause of death)    Cancer Brother       Social History     Socioeconomic History    Marital status:    Tobacco Use    Smoking status: Former     Current packs/day: 0.00     Average packs/day: 1 pack/day for 17.0 years (17.0 ttl pk-yrs)     Types: Cigarettes     Start date: 1989     Quit date: 2006     Years since quittin.3    Smokeless tobacco: Never   Vaping Use    Vaping status: Never Used   Substance and Sexual Activity    Alcohol use: Yes     Alcohol/week: 4.0 standard drinks of alcohol     Types: 4 Cans of beer per week     Comment: occasionally    Drug use: No   Other Topics Concern    Currently spends a great deal of time in the sun No    Past Sunlamp Treatments for Acne No    History of tanning No    Hx of Spending Great Deal of Time in Sun No    Bad sunburns in the past No    Tanning Salons in the Past Yes     Comment: 20 years ago    Hx of Radiation Treatments No    Regular use of sun block No    Caffeine Concern Yes     Comment: Soda rarely    Exercise No           REVIEW OF SYSTEMS:     Denies nause, fever, chills  No calf pain  Denies chest pain or SOB      EXAM:   There were no vitals taken for this visit.  GENERAL: well developed, well nourished, in no apparent distress  EXTREMITIES:   1. Integument: Normal skin temperature and  turgor. Toenails x10 are elongated, thickened and discolored with subungal derbi.     2. Vascular: Dorsalis pedis two out of four bilateral and posterior tibial pulses two out of   four bilateral, capillary refill normal.   3. Musculoskeletal: All muscle groups are graded 5 out of 5 in the foot and ankle.   4. Neurological: Normal sharp dull sensation; reflexes normal.             ASSESSMENT AND PLAN:   Diagnoses and all orders for this visit:    Gait instability    Onychomycosis    Pain in toes of both feet          Plan:       -Patient examined, chart history reviewed.  -Discussed importance of proper pedal hygiene, regular foot checks, and tight glucose control.  -Sharply debrided nails x10 with a sterile nail nipper achieving a 20% reduction in thickness and length, without incident.   -Ambulate with supportive shoes and inserts and avoid walking barefoot.  -Educated patient on acute signs of infection advised patient to seek immediate medical attention if symptoms arise.    RTC in 3 months     The patient indicates understanding of these issues and agrees to the plan.        Roxann Huynh DPM

## 2024-11-15 ENCOUNTER — TELEPHONE (OUTPATIENT)
Facility: CLINIC | Age: 64
End: 2024-11-15

## 2024-11-15 DIAGNOSIS — K44.9 HIATAL HERNIA: ICD-10-CM

## 2024-11-15 DIAGNOSIS — Z12.11 COLON CANCER SCREENING: Primary | ICD-10-CM

## 2024-11-15 DIAGNOSIS — K21.9 GASTROESOPHAGEAL REFLUX DISEASE, UNSPECIFIED WHETHER ESOPHAGITIS PRESENT: ICD-10-CM

## 2024-11-15 NOTE — TELEPHONE ENCOUNTER
Dr. Carreno    Colon and EGD recall.     Reviewed allergies pharmacy, medications, medical/surgical history on file, and GI symptoms.     Please provide orders if ok to schedule directly.     Last Procedure, Date, MD:  colonoscopy and EGD Dr. Carreno 3/18/2024  Last Diagnosis:  colon polyps x5, diverticular disease, internal hemorrhoids, hiatal hernia 4 cm, Irregular Z-line  Recalled (mth/yrs): 1 year recall for colonoscopy and EGD  Sedation Used Previously:  MAC  Last Prep Used (if known):  Suprep  Quality Of Prep (if known): good  Anticoagulants: no  Diabetic Medication (Includes Insulin): no  Weight loss Medication:  no  Iron/Herbal/Multivitamin Supplements: no  Marijuana/Vaping/CBD: no  Height/Weight: 5'7\" 220 lbs   BMI: 34.45  Hx of Cardiac &/or CVA Issues (MI/Stroke): no  Devices Pacemaker/Defibrillator/Stent(s): no  Respiratory Issues/Oxygen Use/GEOFFREY/COPD:  no  Issues w/ Anesthesia: no    Symptoms (Y/N): no  Symptoms Details: n/a    Special Comments/Notes: n/a    Thank you!

## 2024-11-15 NOTE — TELEPHONE ENCOUNTER
Darin Carreno MD   Physician  Gastroenterology     Operative Report     Signed     Date of Service: 3/18/2024 11:02 AM  Case Time: Procedures: Surgeons:   3/18/2024 11:02 AM COLONOSCOPY   ESOPHAGOGASTRODUODENOSCOPY (EGD)    Darin Carreno MD               Signed         [facility] Endoscopy Report  Date of procedure-March 18, 2024     Preoperative Diagnosis:  -Colorectal cancer screening  -History diverticulitis  -GERD        Postoperative Diagnosis:  -Colon polyps x 5  -Diverticular disease  -Internal hemorrhoids  -Hiatal hernia 4 cm  -Irregular Z-line        Procedure:    Colonoscopy   Esophagogastroduodenoscopy         Surgeon:  Darin Carreno M.D.     Anesthesia:  MAC      Technique:  After informed consent, the patient was placed in the left lateral recumbent position.  Digital rectal examination revealed no palpable intraluminal abnormalities.  An Olympus variable stiffness 190 series HD colonoscope was inserted into the rectum and advanced under direct vision by following the lumen to the cecum.  The colon was examined upon withdrawal in the left lateral position.     Following colonoscopy, an Olympus adult HD gastroscope was inserted into the hypopharynx and advanced under direct vision into the esophagus, stomach and duodenum.  The endoscope was withdrawn to the stomach where retroflexion of the annulus, body, cardia and fundus was performed.  The instrument was straightened, insufflated air and fluid were suctioned and the endoscope was withdrawn.  The procedures were well tolerated without immediate complication.        Findings:  The preparation of the colon was good.  The terminal ileum was examined for 4 cm and visually normal.  The ileocecal valve was well preserved. The visualized colonic mucosa from the cecum to the anal verge was normal with an intact vascular pattern.     Colon polyps x 5 removed as follows;  -Proximal transverse x 1, serrated adenomatous appearing sessile 2  cm polyp which was removed by hot snare and 3 passes, this was lifted before snared with Eleview 2 mL injected underneath to lift the lesion.  Endoclips x 5 placed across the mucosal defect with excellent result.    -Rectosigmoid x 4, these had hyperplastic features and were all diminutive and removed by cold forceps technique.  All polypectomy sites inspected and found to be free of bleeding and specimens retrieved and sent for analysis.     Diverticulosis located in the sigmoid colon and scattered rare diverticula were noted in the proximal colon/ascending region, no evidence of diverticulitis.     Small internal hemorrhoids noted on retroflexed view.     The esophagus showed an irregular Z-line at the GE junction, biopsies taken to rule out Warner's..  The GE junction and diaphragmatic impression were at 36 cm and 40 cm for 4 cm hiatal hernia, no Rashawn's erosions noted..  The stomach distended appropriately with insufflated air.  The mucosa of the stomach including cardia, fundus, gastric body and antrum were normal.  The duodenal bulb and post bulbar regions were normal.     Estimated blood loss-insignificant  Specimens-see above     Impression:  -Colon polyps x 5  -Diverticular disease  -Internal hemorrhoids  -Hiatal hernia 4 cm  -Irregular Z-line     Recommendations:  - Post polypectomy instructions given  - Repeat colonoscopy in 1 year to follow up polypectomy site transverse colon  - High fiber diet for diverticular disease  - Symptomatic treatment of hemorrhoids  - Antireflux dietary and behavioral changes for her hiatal hernia, PPI therapy.  - Follow-up on biopsy results of the GE junction to rule out Warner's              Darin Carreno MD  3/18/2024  11:47 AM                 Darin Carreno MD  3/19/2024  5:32 PM CDT       I wanted to get back to you with your colonoscopy and EGD results.       You had 5 colon polyps removed which were benign.  I would advise a repeat colonoscopy in one year to  make sure no new polyps are forming.  You also have internal hemorrhoids and diverticulosis.     The upper endoscopy showed a hiatal hernia ( no eating 3 hours before bed) and samples taken from the esophagus were showed some changes of Barretts esophagus.  Please stay on an antacid ( like omeprazole) and we will repeat EGD in 1 year as well to check up on this.     Call with questions.             Contains abnormal data Specimen to Pathology Tissue: [id number]  Order: [id number]   Collected 3/18/2024 11:35 AM       Status: Final result       Visible to patient: Yes (seen)       Dx: Screen for colon cancer; History of d...    2 Result Notes       1 Patient Communication       2 Follow-up Encounters        Component  Ref Range & Units      Case Report     Surgical Pathology                                Case: [id number]                                     Authorizing Provider:  Darin Carreno MD       Collected:           03/18/2024 11:35 AM             Ordering Location:     [facility] Endoscopy   Received:            03/18/2024 02:09 PM             Pathologist:           Dada Cedeno MD                                                             Specimens:   A) - Recto sigmoid, polyp x 4                                                                          B) - Colon transverse, polyp x 1                                                                      C) - Esophagus distal, biopsies                                                                Final Diagnosis:        A.  Rectosigmoid colon polyps (4); polypectomy:  Fragment of tubular adenoma, fragments of sessile serrated adenoma, and fragments of hyperplastic polyp (1.0 cm in maximum dimension in aggregate).  No evidence of severe dysplasia/carcinoma in situ or infiltrating carcinoma identified     B.  Transverse colon polyp; polypectomy:  Fragments of sessile serrated adenoma (1.3 cm in maximum dimension in aggregate).  No evidence of severe  dysplasia/carcinoma in situ or infiltrating carcinoma identified.     C.  Distal esophagus; biopsy:  Fragments of predominantly gastric-type mucosa demonstrating mild chronic gastritis and focally prominent areas of incomplete and focal complete intestinal metaplasia (see comment).  Adjacent areas of squamous esophageal mucosa showing features compatible with mild chronic reflux esophagitis.  No evidence of viral inclusions, epithelioid granulomas, markedly increased intraepithelial eosinophils (<1 eosinophil per high power field),  epithelial dysplasia, or malignancy identified.   No evidence of tissue-invasive fungal organisms, parasitic organisms, or bacterial organisms consistent with Helicobacter species seen on Giemsa stain (with appropriate control reactivity).     Comment:  Differential considerations for the findings of intestinal metaplasia in association with gastric-type mucosa as seen in the current distal esophageal biopsy, specimen C (2 slides, 6 levels) may include Warner's esophagus vs. intestinal metaplasia in association with chronic gastritis of the gastric cardia.  Considering the very close association between areas of intestinal metaplasia and adjacent squamous esophageal mucosa, Warner's esophagus is favored.  Nevertheless, clinical correlation and correlation with endoscopic findings are recommended to further characterize this histologic finding.        Electronically signed by Dada Cedeno MD on 3/19/2024 at  1:27 PM        Clinical Information      Z12.11 Screen For Colon Cancer.  Z87.19 History Of Diverticulitis.  K21.9 Gastroesophageal Reflux Disease, Unspecified Whether Esophagitis Present.  Colon polyps, diverticulosis, hemorrhoids, irregular Z-line     Gross Description      Specimen A is submitted in formalin labeled “Booth, rectosigmoid polyps x4” and consists of multiple fragments of pink-tan soft tissue measuring in aggregate 1.0 x 0.5 x 0.1 cm. The specimen is submitted  entirely in cassette A1.      Specimen B is submitted in formalin labeled “Booth, transverse colon polyp x1” and consists of multiple fragments of pink-tan soft tissue measuring in aggregate 1.3 x 0.7 x 0.2 cm. The specimen is submitted entirely in cassette B1.      Specimen C is submitted in formalin labeled “Booth, distal esophagus biopsies” and consists of three fragments of pink-tan soft tissue measuring in aggregate 0.4 x 0.3 x 0.1 cm. The specimen is submitted entirely in cassette C1.  (jq)     Dada Cedeno M.D./[facility]     Interpretation     Abnormal Abnormal      Electronically signed by Dada Cedeno MD on 3/19/2024 at  1:27 PM     Resulting Agency [facility] (Formerly [facility], later [facility])               Specimen Collected: 03/18/24 11:35 AM Last Resulted: 03/19/24  1:27 PM

## 2024-11-18 RX ORDER — SODIUM, POTASSIUM,MAG SULFATES 17.5-3.13G
SOLUTION, RECONSTITUTED, ORAL ORAL
Qty: 1 EACH | Refills: 0 | Status: SHIPPED | OUTPATIENT
Start: 2024-11-18

## 2024-11-19 NOTE — TELEPHONE ENCOUNTER
Called patient - left voicemail to call office back to discuss scheduling options for a colonoscopy and EGD.    Please transfer call to GI schedulers.

## 2024-11-21 NOTE — TELEPHONE ENCOUNTER
Scheduled for:  Colonoscopy 14759; EGD 98297  Provider Name:  Dr. Carreno  Date:  4/4/2025  Location:   [facility]  Sedation:  MAC  Time:  9:45 AM - Patient is aware NE will call the day before with arrival time.  Prep:  Suprep  Meds/Allergies Reconciled?:  Physician reviewed   Diagnosis with codes:  Colon cancer screening Z12.11; Hiatal hernia K44.9; GERD K21.9  Was patient informed to call insurance with codes (Y/N):  Yes, I confirmed [employer] PPO insurance with the patient.   Referral sent?:  Referral was sent at the time of electronic surgical scheduling.   EM or [facility] notified?:  I sent an electronic request to Endo Scheduling and received a confirmation today.   Medication Orders:  N/A  Misc Orders:  N/A     Further instructions given by staff:  I discussed the prep instructions with the patient which she verbally understood and is aware that I will send the instructions today.

## 2024-12-20 ENCOUNTER — OFFICE VISIT (OUTPATIENT)
Dept: PODIATRY CLINIC | Facility: CLINIC | Age: 64
End: 2024-12-20

## 2024-12-20 DIAGNOSIS — B35.1 ONYCHOMYCOSIS: ICD-10-CM

## 2024-12-20 DIAGNOSIS — R26.81 GAIT INSTABILITY: Primary | ICD-10-CM

## 2024-12-20 DIAGNOSIS — M79.675 PAIN IN TOES OF BOTH FEET: ICD-10-CM

## 2024-12-20 DIAGNOSIS — M79.674 PAIN IN TOES OF BOTH FEET: ICD-10-CM

## 2024-12-20 PROCEDURE — 99213 OFFICE O/P EST LOW 20 MIN: CPT | Performed by: STUDENT IN AN ORGANIZED HEALTH CARE EDUCATION/TRAINING PROGRAM

## 2024-12-20 NOTE — PROGRESS NOTES
[facility] Podiatry  Progress Note      Vinita Booth is a 64 year old female.   Chief Complaint   Patient presents with    Toenail Care     Nail trim and foot check f/u- LOV w/ PCP Dr. Kelley on 3/05/2024- denies pain              HPI:     Patient is a pleasant 64 -year-old female with gait instability that presents to clinic for bilateral foot evaluation.  Denies any acute signs of infection.  Denies any pain.    Allergies: Patient has no known allergies.    Current Outpatient Medications   Medication Sig Dispense Refill    Na Sulfate-K Sulfate-Mg Sulf (SUPREP BOWEL PREP KIT) 17.5-3.13-1.6 GM/177ML Oral Solution Take as directed 1 each 0    telmisartan 80 MG Oral Tab Take 1 tablet (80 mg total) by mouth daily. 90 tablet 3    hydroCHLOROthiazide 12.5 MG Oral Cap Take 1 capsule (12.5 mg total) by mouth daily. For high blood pressure. 90 capsule 3    ALPRAZolam 0.25 MG Oral Tab Take 1 tablet (0.25 mg total) by mouth 2 (two) times daily. (Patient not taking: Reported on 11/15/2024) 16 tablet 0    PARoxetine 10 MG Oral Tab Take 2 tablets (20 mg total) by mouth every evening. 180 tablet 3    albuterol 108 (90 Base) MCG/ACT Inhalation Aero Soln Inhale 2 puffs into the lungs 3 (three) times daily as needed for Wheezing or Shortness of Breath. 1 each 0    clotrimazole-betamethasone 1-0.05 % External Cream Apply 1 Application topically 2 (two) times daily. (Patient not taking: Reported on 11/15/2024) 45 g 3    Omeprazole 40 MG Oral Capsule Delayed Release Take 1 capsule (40 mg total) by mouth daily. Take 1 capsule by mouth daily before breakfast. 90 capsule 4      Past Medical History:    Anxiety state    Back problem    Constipation    Dermatological disorder    per [employer]:  \"removal of cyst under the chin\"    Dermatological disorder    per [employer]:  \"removal of cyst under left arm\"    Diverticular disease    Hemorrhoids    High blood pressure    Osteoarthritis    Pilonidal cyst    Unspecified essential  hypertension      Past Surgical History:   Procedure Laterality Date    Colonoscopy N/A 2024    COLONOSCOPY with polypectomy, sclerotherapy, cautery and clipping x 5    Colonoscopy N/A 3/18/2024    Procedure: COLONOSCOPY;  Surgeon: Darin Carreno MD;  Location: [facility] ENDO    Egd N/A 2024    ESOPHAGOGASTRODUODENOSCOPY (EGD) with biopsies    Electrocardiogram, complete  2014    Scanned to Media Tab - Date of Service 2014    Other surgical history      laporoscopy    Other surgical history      amniocentesis    Skin surgery      excision of pilonidal cyst X2`    Skin surgery      several cyst excision     Tonsillectomy      Total hip replacement        Family History   Problem Relation Age of Onset    Heart Disease Father         CAD (cause of death)    Cancer Father         colon    Cancer Mother         skin    Other (Other) Mother         Aortic aneurysm (cause of death)    Cancer Brother       Social History     Socioeconomic History    Marital status:    Tobacco Use    Smoking status: Former     Current packs/day: 0.00     Average packs/day: 1 pack/day for 17.0 years (17.0 ttl pk-yrs)     Types: Cigarettes     Start date: 1989     Quit date: 2006     Years since quittin.7    Smokeless tobacco: Never   Vaping Use    Vaping status: Never Used   Substance and Sexual Activity    Alcohol use: Yes     Alcohol/week: 4.0 standard drinks of alcohol     Types: 4 Cans of beer per week     Comment: occasionally    Drug use: No   Other Topics Concern    Currently spends a great deal of time in the sun No    Past Sunlamp Treatments for Acne No    History of tanning No    Hx of Spending Great Deal of Time in Sun No    Bad sunburns in the past No    Tanning Salons in the Past Yes     Comment: 20 years ago    Hx of Radiation Treatments No    Regular use of sun block No    Caffeine Concern Yes     Comment: Soda rarely    Exercise No           REVIEW OF SYSTEMS:     Denies nause, fever,  chills  No calf pain  Denies chest pain or SOB      EXAM:   There were no vitals taken for this visit.  GENERAL: well developed, well nourished, in no apparent distress  EXTREMITIES:   1. Integument: Normal skin temperature and turgor. Toenails x10 are elongated, thickened and discolored with subungal derbi.     2. Vascular: Dorsalis pedis two out of four bilateral and posterior tibial pulses two out of   four bilateral, capillary refill normal.   3. Musculoskeletal: All muscle groups are graded 5 out of 5 in the foot and ankle.   4. Neurological: Normal sharp dull sensation; reflexes normal.             ASSESSMENT AND PLAN:   Diagnoses and all orders for this visit:    Gait instability    Onychomycosis    Pain in toes of both feet            Plan:       -Patient examined, chart history reviewed.  -Discussed importance of proper pedal hygiene, regular foot checks, and tight glucose control.  -Sharply debrided nails x10 with a sterile nail nipper achieving a 20% reduction in thickness and length, without incident.   -Ambulate with supportive shoes and inserts and avoid walking barefoot.  -Educated patient on acute signs of infection advised patient to seek immediate medical attention if symptoms arise.    RTC in 3 months     The patient indicates understanding of these issues and agrees to the plan.        Roxann Huynh DPM

## 2025-01-27 RX ORDER — OMEPRAZOLE 40 MG/1
40 CAPSULE, DELAYED RELEASE ORAL DAILY
Qty: 90 CAPSULE | Refills: 4 | Status: SHIPPED | OUTPATIENT
Start: 2025-01-27

## 2025-01-27 NOTE — TELEPHONE ENCOUNTER
Requested Prescriptions     Pending Prescriptions Disp Refills    OMEPRAZOLE 40 MG Oral Capsule Delayed Release [Pharmacy Med Name: OMEPRAZOLE 40MG CAPSULES] 90 capsule 4     Sig: TAKE 1 CAPSULE(40 MG) BY MOUTH DAILY BEFORE BREAKFAST       LOV    10/19/2023  Last procedure 3/18/2024  CLN/EGD scheduled on4/4/2025      LR     10/26/2023      [place]

## 2025-01-31 DIAGNOSIS — F43.21 GRIEF: ICD-10-CM

## 2025-01-31 DIAGNOSIS — F32.A DEPRESSION, UNSPECIFIED DEPRESSION TYPE: ICD-10-CM

## 2025-01-31 DIAGNOSIS — I10 ESSENTIAL HYPERTENSION: ICD-10-CM

## 2025-02-04 NOTE — TELEPHONE ENCOUNTER
Please review. Protocol Failed; No Protocol    Future Appointments   Date Time Provider Department Center   3/21/2025 12:40 PM Roxann Huynh DPM [facility]   4/4/2025  9:45 AM GEORGE MENDOZA [facility] None     [facility] sent to patient to schedule an office visit with Provider and/or  Routed to Patient  for assistance with appointment.     Requested Prescriptions   Pending Prescriptions Disp Refills    PAROXETINE 10 MG Oral Tab [Pharmacy Med Name: PAROXETINE 10MG TABLETS] 180 tablet 3     Sig: TAKE 2 TABLETS(20 MG) BY MOUTH EVERY EVENING       Psychiatric Non-Scheduled (Anti-Anxiety) Failed - 2/4/2025 10:30 AM        Failed - In person appointment or virtual visit in the past 6 mos or appointment in next 3 mos     Recent Outpatient Visits              1 month ago Gait instability    [facility] Roxann Cm DPM    Office Visit    6 months ago Gait instability    [facility] Roxann Cm DPM    Office Visit    8 months ago Gait instability    [facility] Roxann Cm DPM    Office Visit    11 months ago Anxiety    [facility], Chandrakant Whitehead DO    Office Visit    11 months ago Gait instability    [facility] Roxann Cm DPM    Office Visit          Future Appointments         Provider Department Appt Notes    In 1 [facility]    In 1 month GEORGE MENDOZA [facility] CLN/SHASHANK w/MAC @ [facility]                    Passed - Depression Screening completed within the past 12 months        Passed - Medication is active on med list               Future Appointments         Provider Department Appt Notes     In 1 month [facility]    In 1 month MENDOZA, PROCEDURE [facility] CLN/SHASHANK w/MAC @ [facility]          Recent Outpatient Visits              1 month ago Gait instability    [facility], Roxann Cm DPM    Office Visit    6 months ago Gait instability    [facility], Roxann Cm DPM    Office Visit    8 months ago Gait instability    [facility], Roxann Cm DPM    Office Visit    11 months ago Anxiety    [facility], Chandrakant Whitehead DO    Office Visit    11 months ago Gait instability    [facility], Roxann Cm DPM    Office Visit

## 2025-02-04 NOTE — TELEPHONE ENCOUNTER
Please review. Protocol Failed; No Protocol    Requested Prescriptions   Pending Prescriptions Disp Refills    TELMISARTAN 80 MG Oral Tab [Pharmacy Med Name: TELMISARTAN 80MG TABLETS] 90 tablet 3     Sig: TAKE 1 TABLET(80 MG) BY MOUTH DAILY       Hypertension Medications Protocol Failed - 2/4/2025 10:32 AM        Failed - CMP or BMP in past 12 months        Failed - EGFRCR or GFRNAA > 50     GFR Evaluation            Passed - Last BP reading less than 140/90     BP Readings from Last 1 Encounters:   03/18/24 110/76               Passed - In person appointment or virtual visit in the past 12 mos or appointment in next 3 mos     Recent Outpatient Visits              1 month ago Gait instability    [facility], Roxann Cm DPM    Office Visit    6 months ago Gait instability    [facility], Roxann Cm DPM    Office Visit    8 months ago Gait instability    [facility], Roxann Cm DPM    Office Visit    11 months ago Anxiety    [facility], Chandrakant Whitehead DO    Office Visit    11 months ago Gait instability    [facility], Roxann Cm, DPM    Office Visit          Future Appointments         Provider Department Appt Notes    In 1 month [facility]    In 1 month GEORGE MENDOZA [facility] [place] CLN/LEDAD w/MAC @ [place]                    Passed - Medication is active on med list               Future Appointments         Provider Department Appt Notes    In 1 month Roxann Huynh DPM [facility] same as usual    In 1 month GEORGE MENDOZA [facility] [place] CLN/EGD w/MAC @ [place]           Recent Outpatient Visits              1 month ago Gait instability    [facility], Roxann Cm DPM    Office Visit    6 months ago Gait instability    [facility], Roxann Cm DPM    Office Visit    8 months ago Gait instability    [facility], Roxann Cm DPM    Office Visit    11 months ago Anxiety    [facility] [address], Chandrakant Whitehead DO    Office Visit    11 months ago Gait instability    [facility], Roxann Cm DPM    Office Visit

## 2025-02-05 RX ORDER — TELMISARTAN 80 MG/1
80 TABLET ORAL DAILY
Qty: 90 TABLET | Refills: 0 | Status: SHIPPED | OUTPATIENT
Start: 2025-02-05 | End: 2025-05-02

## 2025-02-05 RX ORDER — PAROXETINE 10 MG/1
20 TABLET, FILM COATED ORAL EVERY EVENING
Qty: 180 TABLET | Refills: 0 | Status: SHIPPED | OUTPATIENT
Start: 2025-02-05

## 2025-02-26 ENCOUNTER — OFFICE VISIT (OUTPATIENT)
Dept: FAMILY MEDICINE CLINIC | Facility: CLINIC | Age: 65
End: 2025-02-26
Payer: COMMERCIAL

## 2025-02-26 VITALS
WEIGHT: 248 LBS | BODY MASS INDEX: 38.92 KG/M2 | SYSTOLIC BLOOD PRESSURE: 132 MMHG | DIASTOLIC BLOOD PRESSURE: 76 MMHG | HEIGHT: 67 IN | HEART RATE: 94 BPM

## 2025-02-26 DIAGNOSIS — L02.91 ABSCESS: Primary | ICD-10-CM

## 2025-02-26 PROCEDURE — 3008F BODY MASS INDEX DOCD: CPT | Performed by: PHYSICIAN ASSISTANT

## 2025-02-26 PROCEDURE — 99213 OFFICE O/P EST LOW 20 MIN: CPT | Performed by: PHYSICIAN ASSISTANT

## 2025-02-26 PROCEDURE — 3078F DIAST BP <80 MM HG: CPT | Performed by: PHYSICIAN ASSISTANT

## 2025-02-26 PROCEDURE — 3075F SYST BP GE 130 - 139MM HG: CPT | Performed by: PHYSICIAN ASSISTANT

## 2025-02-26 RX ORDER — MUPIROCIN 20 MG/G
OINTMENT TOPICAL
Qty: 30 G | Refills: 0 | Status: SHIPPED | OUTPATIENT
Start: 2025-02-26

## 2025-02-26 RX ORDER — CEPHALEXIN 500 MG/1
500 CAPSULE ORAL 4 TIMES DAILY
Qty: 40 CAPSULE | Refills: 0 | Status: SHIPPED | OUTPATIENT
Start: 2025-02-26 | End: 2025-03-08

## 2025-02-26 NOTE — PROGRESS NOTES
HPI:     HPI  A 64-year-old woman has had a bump on her left elbow since yesterday. The bump is red, swollen, painful, and has some drainage.  She denies fever, elbow injury, or weakness.    Medications:     Current Outpatient Medications   Medication Sig Dispense Refill    mupirocin 2 % External Ointment Apply to affect lesions tid 30 g 0    cephALEXin 500 MG Oral Cap Take 1 capsule (500 mg total) by mouth 4 (four) times daily for 10 days. 40 capsule 0    PARoxetine 10 MG Oral Tab Take 2 tablets (20 mg total) by mouth every evening. 180 tablet 0    telmisartan 80 MG Oral Tab Take 1 tablet (80 mg total) by mouth daily. 90 tablet 0    OMEPRAZOLE 40 MG Oral Capsule Delayed Release TAKE 1 CAPSULE(40 MG) BY MOUTH DAILY BEFORE BREAKFAST 90 capsule 4    Na Sulfate-K Sulfate-Mg Sulf (SUPREP BOWEL PREP KIT) 17.5-3.13-1.6 GM/177ML Oral Solution Take as directed 1 each 0    hydroCHLOROthiazide 12.5 MG Oral Cap Take 1 capsule (12.5 mg total) by mouth daily. For high blood pressure. 90 capsule 3    ALPRAZolam 0.25 MG Oral Tab Take 1 tablet (0.25 mg total) by mouth 2 (two) times daily. 16 tablet 0    albuterol 108 (90 Base) MCG/ACT Inhalation Aero Soln Inhale 2 puffs into the lungs 3 (three) times daily as needed for Wheezing or Shortness of Breath. 1 each 0    clotrimazole-betamethasone 1-0.05 % External Cream Apply 1 Application topically 2 (two) times daily. 45 g 3       Allergies:   Allergies[1]    History:     Health Maintenance   Topic Date Due    Mammogram  Never done    Pap Smear  Never done    Pneumococcal Vaccine: 50+ Years (1 of 1 - PCV) Never done    Zoster Vaccines (1 of 2) Never done    DTaP,Tdap,and Td Vaccines (2 - Td or Tdap) 05/29/2024    COVID-19 Vaccine (5 - 2024-25 season) 09/01/2024    Influenza Vaccine (1) 10/01/2024    Annual Physical  12/01/2024    Annual Depression Screening  01/01/2025    Colorectal Cancer Screening  03/18/2025    Meningococcal B Vaccine  Aged Out       No LMP recorded. Patient is  postmenopausal.   Past Medical History:     Past Medical History:    Anxiety state    Back problem    Constipation    Dermatological disorder    per [employer]:  \"removal of cyst under the chin\"    Dermatological disorder    per Next[employer]:  \"removal of cyst under left arm\"    Diverticular disease    Hemorrhoids    High blood pressure    Osteoarthritis    Pilonidal cyst    Unspecified essential hypertension       Past Surgical History:     Past Surgical History:   Procedure Laterality Date    Colonoscopy N/A 2024    COLONOSCOPY with polypectomy, sclerotherapy, cautery and clipping x 5    Colonoscopy N/A 3/18/2024    Procedure: COLONOSCOPY;  Surgeon: Darin Carreno MD;  Location: [facility] ENDO    Egd N/A 2024    ESOPHAGOGASTRODUODENOSCOPY (EGD) with biopsies    Electrocardiogram, complete  2014    Scanned to Media Tab - Date of Service 2014    Other surgical history      laporoscopy    Other surgical history      amniocentesis    Skin surgery      excision of pilonidal cyst X2`    Skin surgery      several cyst excision     Tonsillectomy      Total hip replacement         Family History:     Family History   Problem Relation Age of Onset    Heart Disease Father         CAD (cause of death)    Cancer Father         colon    Cancer Mother         skin    Other (Other) Mother         Aortic aneurysm (cause of death)    Cancer Brother        Social History:     Social History     Socioeconomic History    Marital status:      Spouse name: Not on file    Number of children: Not on file    Years of education: Not on file    Highest education level: Not on file   Occupational History    Not on file   Tobacco Use    Smoking status: Former     Current packs/day: 0.00     Average packs/day: 1 pack/day for 17.0 years (17.0 ttl pk-yrs)     Types: Cigarettes     Start date: 1989     Quit date: 2006     Years since quittin.9    Smokeless tobacco: Never   Vaping Use    Vaping status: Never Used    Substance and Sexual Activity    Alcohol use: Yes     Alcohol/week: 4.0 standard drinks of alcohol     Types: 4 Cans of beer per week     Comment: occasionally    Drug use: No    Sexual activity: Not on file   Other Topics Concern    Left Handed Not Asked    Right Handed Not Asked    Currently spends a great deal of time in the sun No    Past Sunlamp Treatments for Acne No    History of tanning No    Hx of Spending Great Deal of Time in Sun No    Bad sunburns in the past No    Tanning Salons in the Past Yes     Comment: 20 years ago    Hx of Radiation Treatments No    Regular use of sun block No     Service Not Asked    Blood Transfusions Not Asked    Caffeine Concern Yes     Comment: Soda rarely    Occupational Exposure Not Asked    Hobby Hazards Not Asked    Sleep Concern Not Asked    Stress Concern Not Asked    Weight Concern Not Asked    Special Diet Not Asked    Back Care Not Asked    Exercise No    Bike Helmet Not Asked    Seat Belt Not Asked    Self-Exams Not Asked   Social History Narrative    The patient does not use an assistive device..      The patient does live in a home with stairs.     Social Drivers of Health     Food Insecurity: Not on file   Transportation Needs: Not on file   Stress: Not on file   Housing Stability: Not on file       Review of Systems:   Review of Systems   + a bump on her left elbow.    Vitals:    02/26/25 1328 02/26/25 1340   BP: 157/90 132/76   Pulse: 94    Weight: 248 lb (112.5 kg)    Height: 5' 7\" (1.702 m)      Body mass index is 38.84 kg/m².    Physical Exam:   Physical Exam  Vitals reviewed.   Constitutional:       General: She is not in acute distress.     Appearance: She is well-developed.   Eyes:      General:         Right eye: No discharge.   Cardiovascular:      Heart sounds: S1 normal and S2 normal. No murmur heard.  Skin:     Findings: Lesion present.   Neurological:      Mental Status: She is alert and oriented to person, place, and time.   Psychiatric:          Behavior: Behavior is cooperative.      There is an abscess 5 cm x 3 cm on her left elbow with some drainage. Mild tenderness to palpation.    Assessment and Plan::     Assessment & Plan  Abscess    Orders:    mupirocin 2 % External Ointment; Apply to affect lesions tid    cephALEXin 500 MG Oral Cap; Take 1 capsule (500 mg total) by mouth 4 (four) times daily for 10 days.     Advise the patient to apply warm compresses 10-15 minutes BID.    Discussed plan of care with pt and pt is in agreement.All questions answered. Pt to call with questions or concerns.    RTC in 2-3 days if worsen.       [1] No Known Allergies

## 2025-03-21 ENCOUNTER — OFFICE VISIT (OUTPATIENT)
Dept: PODIATRY CLINIC | Facility: CLINIC | Age: 65
End: 2025-03-21

## 2025-03-21 DIAGNOSIS — B35.1 ONYCHOMYCOSIS: ICD-10-CM

## 2025-03-21 DIAGNOSIS — R26.81 GAIT INSTABILITY: Primary | ICD-10-CM

## 2025-03-21 PROCEDURE — 99213 OFFICE O/P EST LOW 20 MIN: CPT | Performed by: STUDENT IN AN ORGANIZED HEALTH CARE EDUCATION/TRAINING PROGRAM

## 2025-03-21 NOTE — PROGRESS NOTES
[facility] Podiatry  Progress Note      Vinita Booth is a 64 year old female.   Chief Complaint   Patient presents with    Toenail Care     F/u toenail care. LOV w/ PCP Dr. Kelley was on 3/05/2024, denies pain.             HPI:     Patient is a pleasant 64 -year-old female with gait instability that presents to clinic for bilateral foot evaluation.  Denies any acute signs of infection.  Denies any pain.    Allergies: Patient has no known allergies.    Current Outpatient Medications   Medication Sig Dispense Refill    mupirocin 2 % External Ointment Apply to affect lesions tid 30 g 0    PARoxetine 10 MG Oral Tab Take 2 tablets (20 mg total) by mouth every evening. 180 tablet 0    telmisartan 80 MG Oral Tab Take 1 tablet (80 mg total) by mouth daily. 90 tablet 0    OMEPRAZOLE 40 MG Oral Capsule Delayed Release TAKE 1 CAPSULE(40 MG) BY MOUTH DAILY BEFORE BREAKFAST 90 capsule 4    Na Sulfate-K Sulfate-Mg Sulf (SUPREP BOWEL PREP KIT) 17.5-3.13-1.6 GM/177ML Oral Solution Take as directed 1 each 0    hydroCHLOROthiazide 12.5 MG Oral Cap Take 1 capsule (12.5 mg total) by mouth daily. For high blood pressure. 90 capsule 3    ALPRAZolam 0.25 MG Oral Tab Take 1 tablet (0.25 mg total) by mouth 2 (two) times daily. 16 tablet 0    albuterol 108 (90 Base) MCG/ACT Inhalation Aero Soln Inhale 2 puffs into the lungs 3 (three) times daily as needed for Wheezing or Shortness of Breath. 1 each 0    clotrimazole-betamethasone 1-0.05 % External Cream Apply 1 Application topically 2 (two) times daily. 45 g 3      Past Medical History:    Anxiety state    Back problem    Constipation    Dermatological disorder    per [employer]:  \"removal of cyst under the chin\"    Dermatological disorder    per Next[employer]:  \"removal of cyst under left arm\"    Diverticular disease    Hemorrhoids    High blood pressure    Osteoarthritis    Pilonidal cyst    Unspecified essential hypertension      Past Surgical History:   Procedure Laterality Date     Colonoscopy N/A 2024    COLONOSCOPY with polypectomy, sclerotherapy, cautery and clipping x 5    Colonoscopy N/A 3/18/2024    Procedure: COLONOSCOPY;  Surgeon: Darin Carreno MD;  Location: [facility] ENDO    Egd N/A 2024    ESOPHAGOGASTRODUODENOSCOPY (EGD) with biopsies    Electrocardiogram, complete  2014    Scanned to Media Tab - Date of Service 2014    Other surgical history      laporoscopy    Other surgical history      amniocentesis    Skin surgery      excision of pilonidal cyst X2`    Skin surgery      several cyst excision     Tonsillectomy      Total hip replacement        Family History   Problem Relation Age of Onset    Heart Disease Father         CAD (cause of death)    Cancer Father         colon    Cancer Mother         skin    Other (Other) Mother         Aortic aneurysm (cause of death)    Cancer Brother       Social History     Socioeconomic History    Marital status:    Tobacco Use    Smoking status: Former     Current packs/day: 0.00     Average packs/day: 1 pack/day for 17.0 years (17.0 ttl pk-yrs)     Types: Cigarettes     Start date: 1989     Quit date: 2006     Years since quittin.9    Smokeless tobacco: Never   Vaping Use    Vaping status: Never Used   Substance and Sexual Activity    Alcohol use: Yes     Alcohol/week: 4.0 standard drinks of alcohol     Types: 4 Cans of beer per week     Comment: occasionally    Drug use: No   Other Topics Concern    Currently spends a great deal of time in the sun No    Past Sunlamp Treatments for Acne No    History of tanning No    Hx of Spending Great Deal of Time in Sun No    Bad sunburns in the past No    Tanning Salons in the Past Yes     Comment: 20 years ago    Hx of Radiation Treatments No    Regular use of sun block No    Caffeine Concern Yes     Comment: Soda rarely    Exercise No           REVIEW OF SYSTEMS:     Denies nause, fever, chills  No calf pain  Denies chest pain or SOB      EXAM:   There were  no vitals taken for this visit.  GENERAL: well developed, well nourished, in no apparent distress  EXTREMITIES:   1. Integument: Normal skin temperature and turgor. Toenails x10 are elongated, thickened and discolored with subungal derbi.     2. Vascular: Dorsalis pedis two out of four bilateral and posterior tibial pulses two out of   four bilateral, capillary refill normal.   3. Musculoskeletal: All muscle groups are graded 5 out of 5 in the foot and ankle.   4. Neurological: Normal sharp dull sensation; reflexes normal.             ASSESSMENT AND PLAN:   There are no diagnoses linked to this encounter.          Plan:       -Patient examined, chart history reviewed.  -Discussed importance of proper pedal hygiene, regular foot checks, and tight glucose control.  -Sharply debrided nails x10 with a sterile nail nipper achieving a 20% reduction in thickness and length, without incident.   -Ambulate with supportive shoes and inserts and avoid walking barefoot.  -Educated patient on acute signs of infection advised patient to seek immediate medical attention if symptoms arise.    RTC in 3 months     The patient indicates understanding of these issues and agrees to the plan.        Roxann Huynh DPM

## 2025-03-28 ENCOUNTER — TELEPHONE (OUTPATIENT)
Facility: CLINIC | Age: 65
End: 2025-03-28

## 2025-03-28 NOTE — TELEPHONE ENCOUNTER
Patient calling regards if authorization is needed for procedure. Please call and able to leave voicemail.

## 2025-03-28 NOTE — TELEPHONE ENCOUNTER
PPD Team    Any updates on the status of the prior approval that was submitted for her colonoscopy and EGD?    Thank you

## 2025-04-01 ENCOUNTER — TELEPHONE (OUTPATIENT)
Facility: CLINIC | Age: 65
End: 2025-04-01

## 2025-04-01 NOTE — TELEPHONE ENCOUNTER
Patient contacted and information below given to patient.  Patient advised to still contact her insurance and ask if she has any out of pocket costs or a deductible to meet.  Patient voiced understanding.        Per Referral -  CPT code 51782 and 47946 has been approved  Authorization: [id number]  Valid from: 4/4/25----7/4/25

## 2025-04-01 NOTE — TELEPHONE ENCOUNTER
Patient would like to know if prior auth was given from [employer], for her colonoscopy and endoscopy procedures scheduled for this Friday?

## 2025-04-04 ENCOUNTER — HOSPITAL ENCOUNTER (OUTPATIENT)
Age: 65
Setting detail: HOSPITAL OUTPATIENT SURGERY
Discharge: HOME | End: 2025-04-04
Attending: INTERNAL MEDICINE | Admitting: INTERNAL MEDICINE
Payer: COMMERCIAL

## 2025-04-04 ENCOUNTER — ANESTHESIA EVENT (OUTPATIENT)
Dept: ENDOSCOPY | Age: 65
End: 2025-04-04
Payer: COMMERCIAL

## 2025-04-04 ENCOUNTER — ANESTHESIA (OUTPATIENT)
Dept: ENDOSCOPY | Age: 65
End: 2025-04-04
Payer: COMMERCIAL

## 2025-04-04 VITALS
DIASTOLIC BLOOD PRESSURE: 85 MMHG | HEIGHT: 67 IN | BODY MASS INDEX: 38.92 KG/M2 | RESPIRATION RATE: 16 BRPM | OXYGEN SATURATION: 98 % | SYSTOLIC BLOOD PRESSURE: 131 MMHG | WEIGHT: 248 LBS | HEART RATE: 63 BPM

## 2025-04-04 DIAGNOSIS — Z12.11 COLON CANCER SCREENING: ICD-10-CM

## 2025-04-04 DIAGNOSIS — K21.9 GASTROESOPHAGEAL REFLUX DISEASE, UNSPECIFIED WHETHER ESOPHAGITIS PRESENT: ICD-10-CM

## 2025-04-04 DIAGNOSIS — K44.9 HIATAL HERNIA: ICD-10-CM

## 2025-04-04 PROCEDURE — 88312 SPECIAL STAINS GROUP 1: CPT | Performed by: INTERNAL MEDICINE

## 2025-04-04 PROCEDURE — 88305 TISSUE EXAM BY PATHOLOGIST: CPT | Performed by: INTERNAL MEDICINE

## 2025-04-04 PROCEDURE — 45385 COLONOSCOPY W/LESION REMOVAL: CPT | Performed by: INTERNAL MEDICINE

## 2025-04-04 PROCEDURE — 43239 EGD BIOPSY SINGLE/MULTIPLE: CPT | Performed by: INTERNAL MEDICINE

## 2025-04-04 PROCEDURE — 99070 SPECIAL SUPPLIES PHYS/QHP: CPT | Performed by: INTERNAL MEDICINE

## 2025-04-04 RX ORDER — LIDOCAINE HYDROCHLORIDE 10 MG/ML
INJECTION, SOLUTION EPIDURAL; INFILTRATION; INTRACAUDAL; PERINEURAL AS NEEDED
Status: DISCONTINUED | OUTPATIENT
Start: 2025-04-04 | End: 2025-04-04 | Stop reason: SURG

## 2025-04-04 RX ORDER — SODIUM CHLORIDE, SODIUM LACTATE, POTASSIUM CHLORIDE, CALCIUM CHLORIDE 600; 310; 30; 20 MG/100ML; MG/100ML; MG/100ML; MG/100ML
INJECTION, SOLUTION INTRAVENOUS CONTINUOUS
Status: DISCONTINUED | OUTPATIENT
Start: 2025-04-04 | End: 2025-04-04

## 2025-04-04 RX ADMIN — SODIUM CHLORIDE, SODIUM LACTATE, POTASSIUM CHLORIDE, CALCIUM CHLORIDE: 600; 310; 30; 20 INJECTION, SOLUTION INTRAVENOUS at 09:52:00

## 2025-04-04 RX ADMIN — LIDOCAINE HYDROCHLORIDE 50 MG: 10 INJECTION, SOLUTION EPIDURAL; INFILTRATION; INTRACAUDAL; PERINEURAL at 09:52:00

## 2025-04-04 RX ADMIN — LIDOCAINE HYDROCHLORIDE 50 MG: 10 INJECTION, SOLUTION EPIDURAL; INFILTRATION; INTRACAUDAL; PERINEURAL at 10:19:00

## 2025-04-04 NOTE — OPERATIVE REPORT
[facility] Endoscopy Report  Date of procedure-April 4, 2025    Preoperative Diagnosis:  -Colon cancer screening  - History colon polyps    Postoperative Diagnosis:  - Colon polyps x 3  - Diverticulosis  - Internal hemorrhoids  - Hiatal hernia  - History of Warner's    Procedure:    Colonoscopy   Esophagogastroduodenoscopy     Surgeon:  Darin Carreno M.D.    Anesthesia:  MAC     Technique:  After informed consent, the patient was placed in the left lateral recumbent position.  Digital rectal examination revealed no palpable intraluminal abnormalities.  An Olympus variable stiffness 190 series HD colonoscope was inserted into the rectum and advanced under direct vision by following the lumen to the cecum.  The colon was examined upon withdrawal in the left lateral position.    Following colonoscopy, an Olympus adult HD gastroscope was inserted into the hypopharynx and advanced under direct vision into the esophagus, stomach and duodenum.  The endoscope was withdrawn to the stomach where retroflexion of the annulus, body, cardia and fundus was performed.  The instrument was straightened, insufflated air and fluid were suctioned and the endoscope was withdrawn.  The procedures were well tolerated without immediate complication.      Findings:  The preparation of the colon was good.  The terminal ileum was examined for 4 cm and visually normal.  The ileocecal valve was well preserved. The visualized colonic mucosa from the cecum to the anal verge was normal with an intact vascular pattern.    Colon polyps x 3 removed as follows;  - Transverse x 1, this is at the area of the prior large polyp, small hyperplastic appearing polypoid tissue removed by cold snare technique.  -Sigmoid x 2, both were 3 to 4 mm in size and cold snare removed.  All polypectomy sites inspected and found to be free of bleeding specimens retrieved and sent for analysis.    Diverticulosis located in the sigmoid/descending colon, no  diverticulitis.      Small internal hemorrhoids noted on retroflexed view.    The esophagus showed an irregular z- line, biopsies taken.  The GE junction and diaphragmatic impression were at 36 cm to 40 cm for a 4 cm hiatal hernia.  The stomach distended appropriately with insufflated air.  The mucosa of the stomach including cardia, fundus, gastric body and antrum were normal.  The duodenal bulb and post bulbar regions were normal.    Estimated blood loss-insignificant  Specimens-see above  Impression:  - Colon polyps x 3  - Diverticulosis  - Internal hemorrhoids  - Hiatal hernia  - History of Warner's    Recommendations:  - Post polypectomy instructions given  - Repeat colonoscopy in 3 years  - High fiber diet for diverticular disease  - Symptomatic treatment of hemorrhoids  - Continue on PPI and follow up on biopsies           Darin Carreno MD  4/4/2025  2:11 PM

## 2025-04-04 NOTE — DISCHARGE INSTRUCTIONS
Home Care Instructions for Colonoscopy and/or Gastroscopy with Sedation    Diet:  - Resume your regular diet as tolerated unless otherwise instructed.  - Start with light meals to minimize bloating.  - Do not drink alcohol today.    Medication:  - If you have questions about resuming your normal medications, please contact your Primary Care Physician.    Activities:  - Take it easy today. Do not return to work today.  - Do not drive today.  - Do not operate any machinery today (including kitchen equipment).    Colonoscopy:  - You may notice some rectal \"spotting\" (a little blood on the toilet tissue) for a day or two after the exam. This is normal.  - If you experience any rectal bleeding (not spotting), persistent tenderness or sharp severe abdominal pains, oral temperature over 100 degrees Fahrenheit, light-headedness or dizziness, or any other problems, contact your doctor.    Gastroscopy:  - You may have a sore throat for 2-3 days following the exam. This is normal. Gargling with warm salt water (1/2 tsp salt to 1 glass warm water) or using throat lozenges will help.  - If you experience any sharp pain in your neck, abdomen or chest, vomiting of blood, oral temperature over 100 degrees Fahrenheit, light-headedness or dizziness, or any other problems, contact your doctor.    **If unable to reach your doctor, please go to the [facility] Emergency Room**    - Your referring physician will receive a full report of your examination.  - If you do not hear from your doctor's office within two weeks of your biopsy, please call them for your results.    You may be able to see your laboratory results in [employer] between 4 and 7 business days.  In some cases, your physician may not have viewed the results before they are released to Status[employer].  If you have questions regarding your results contact the physician who ordered the test/exam by phone or via Status[employer] by choosing \"Ask a Medical Question.\"

## 2025-04-04 NOTE — ANESTHESIA POSTPROCEDURE EVALUATION
Patient: Vinita Booth    Procedure Summary       Date: 04/04/25 Room / Location: [facility] ENDOSCOPY 01 / [facility] ENDO    Anesthesia Start: 0952 Anesthesia Stop: 1028    Procedures:       COLONOSCOPY with Polypectomy      ESOPHAGOGASTRODUODENOSCOPY (EGD) with biopsy Diagnosis:       Colon cancer screening      Hiatal hernia      Gastroesophageal reflux disease, unspecified whether esophagitis present      (polyps, Hemorrhoids Hiatal Hernia, Irregular Z Line)    Surgeons: Darin Carreno MD Anesthesiologist: Chirag Jonas MD    Anesthesia Type: MAC ASA Status: 2            Anesthesia Type: MAC    Vitals Value Taken Time   /76 04/04/25 1028   Temp  04/04/25 1028   Pulse 84 04/04/25 1028   Resp 14 04/04/25 1028   SpO2 97 04/04/25 1028       [facility] AN Post Evaluation:   Patient Evaluated in PACU  Patient Participation: complete - patient participated  Level of Consciousness: awake  Pain Management: adequate  Airway Patency:patent  Yes    Nausea/Vomiting: none  Cardiovascular Status: acceptable  Respiratory Status: acceptable and room air  Postoperative Hydration acceptable      Chirag Jonas MD  4/4/2025 10:28 AM

## 2025-04-04 NOTE — H&P
History & Physical Examination    Patient Name: Vinita Booth  MRN: [id number]  CSN: [id number]  YOB: 1960    Diagnosis:   Colon cancer screening Z12.11; Hiatal hernia K44.9; GERD K21.9       Prescriptions Prior to Admission[1]  Current Facility-Administered Medications   Medication Dose Route Frequency    lactated ringers infusion   Intravenous Continuous       Allergies: Allergies[2]    Past Medical History:    Anxiety state    Back problem    Constipation    Dermatological disorder    per [employer]:  \"removal of cyst under the chin\"    Dermatological disorder    per Next[employer]:  \"removal of cyst under left arm\"    Diverticular disease    Hemorrhoids    High blood pressure    Osteoarthritis    Pilonidal cyst    Unspecified essential hypertension     Past Surgical History:   Procedure Laterality Date    Colonoscopy N/A 2024    COLONOSCOPY with polypectomy, sclerotherapy, cautery and clipping x 5    Colonoscopy N/A 3/18/2024    Procedure: COLONOSCOPY;  Surgeon: Darin Carreno MD;  Location: [facility]    Egd N/A 2024    ESOPHAGOGASTRODUODENOSCOPY (EGD) with biopsies    Electrocardiogram, complete  2014    Scanned to Media Tab - Date of Service 2014    Other surgical history      laporoscopy    Other surgical history      amniocentesis    Skin surgery      excision of pilonidal cyst X2`    Skin surgery      several cyst excision     Tonsillectomy      Total hip replacement       Family History   Problem Relation Age of Onset    Heart Disease Father         CAD (cause of death)    Cancer Father         colon    Cancer Mother         skin    Other (Other) Mother         Aortic aneurysm (cause of death)    Cancer Brother      Social History     Tobacco Use    Smoking status: Former     Current packs/day: 0.00     Average packs/day: 1 pack/day for 17.0 years (17.0 ttl pk-yrs)     Types: Cigarettes     Start date: 1989     Quit date: 2006     Years since quittin.0     Smokeless tobacco: Never   Substance Use Topics    Alcohol use: Yes     Alcohol/week: 4.0 standard drinks of alcohol     Types: 4 Cans of beer per week     Comment: occasionally       SYSTEM Check if Review is Normal Check if Physical Exam is Normal If not normal, please explain:   HEENT [x ] [ x]    NECK & BACK [x ] [x ]    HEART [x ] [ x]    LUNGS [x ] [ x]    ABDOMEN [x ] [x ]    UROGENITAL [ ] [ ]    EXTREMITIES [x ] [x ]    OTHER        [ x ] I have discussed the risks and benefits and alternatives with the patient/family.  They understand and agree to proceed with plan of care.  [ x ] I have reviewed the History and Physical done within the last 30 days.  Any changes noted above.    Darin Carreno MD  2025  9:46 AM         [1]   Medications Prior to Admission   Medication Sig Dispense Refill Last Dose/Taking    mupirocin 2 % External Ointment Apply to affect lesions tid 30 g 0 4/3/2025    PARoxetine 10 MG Oral Tab Take 2 tablets (20 mg total) by mouth every evening. 180 tablet 0 4/3/2025    telmisartan 80 MG Oral Tab Take 1 tablet (80 mg total) by mouth daily. 90 tablet 0 2025 Morning    OMEPRAZOLE 40 MG Oral Capsule Delayed Release TAKE 1 CAPSULE(40 MG) BY MOUTH DAILY BEFORE BREAKFAST 90 capsule 4 4/3/2025    Na Sulfate-K Sulfate-Mg Sulf (SUPREP BOWEL PREP KIT) 17.5-3.13-1.6 GM/177ML Oral Solution Take as directed 1 each 0 4/3/2025    hydroCHLOROthiazide 12.5 MG Oral Cap Take 1 capsule (12.5 mg total) by mouth daily. For high blood pressure. 90 capsule 3 4/3/2025    ALPRAZolam 0.25 MG Oral Tab Take 1 tablet (0.25 mg total) by mouth 2 (two) times daily. 16 tablet 0 3/31/2025    albuterol 108 (90 Base) MCG/ACT Inhalation Aero Soln Inhale 2 puffs into the lungs 3 (three) times daily as needed for Wheezing or Shortness of Breath. 1 each 0 3/31/2025    clotrimazole-betamethasone 1-0.05 % External Cream Apply 1 Application topically 2 (two) times daily. 45 g 3 4/3/2025    [] cephALEXin 500 MG Oral  Cap Take 1 capsule (500 mg total) by mouth 4 (four) times daily for 10 days. 40 capsule 0    [2] No Known Allergies

## 2025-04-04 NOTE — ANESTHESIA PREPROCEDURE EVALUATION
Anesthesia PreOp Note    HPI:     Vinita Booth is a 64 year old female who presents for preoperative consultation requested by: Darin Carreno MD    Date of Surgery: 4/4/2025    Procedure(s):  COLONOSCOPY/ ESOPHAGOGASTRODUODENOSCOPY (EGD)  ESOPHAGOGASTRODUODENOSCOPY (EGD)  Indication: Colon cancer screening/ Hiatal hernia/ Gastroesophageal reflux disease, unspecified whether esophagitis present    Relevant Problems   No relevant active problems       NPO:  Last Liquid Consumption Date: 04/04/25  Last Liquid Consumption Time: 0600  Last Solid Consumption Date: 04/03/25  Last Solid Consumption Time: 0900  Last Liquid Consumption Date: 04/04/25          History Review:  Patient Active Problem List    Diagnosis Date Noted    Grief 02/20/2024    Arthritis, lumbar spine 07/28/2023    Other idiopathic scoliosis, lumbosacral region 07/28/2023    Gastroesophageal reflux disease 07/28/2023    Diverticulitis 07/28/2023    Adolescent idiopathic scoliosis of thoracolumbar region 11/20/2018    Lateral femoral cutaneous entrapment syndrome, unspecified laterality 11/20/2018    History of left hip replacement 11/13/2018    Depression 11/13/2018    Anxiety 11/13/2018    Bilateral thigh pain 11/13/2018    Herniation of nucleus pulposus, lumbar 11/13/2018    Foraminal stenosis of lumbar region 11/13/2018    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    Prosthetic hip implant failure, initial encounter 08/04/2018    Postoperative seroma of musculoskeletal structure after musculoskeletal procedure 07/03/2018    Hip pain 07/03/2018    Preop testing 06/19/2018    Primary osteoarthritis of left hip 06/19/2018    Family history of colon cancer 04/23/2018    Left hip pain 10/10/2017    Spinal stenosis 03/10/2017    Lumbago 03/10/2017    Mixed hyperlipidemia 06/12/2015    Essential hypertension, benign 05/18/2015       Past Medical History:    Anxiety state    Back problem    Constipation    Dermatological disorder    per [employer]:   \"removal of cyst under the chin\"    Dermatological disorder    per Next[employer]:  \"removal of cyst under left arm\"    Diverticular disease    Hemorrhoids    High blood pressure    Osteoarthritis    Pilonidal cyst    Unspecified essential hypertension       Past Surgical History:   Procedure Laterality Date    Colonoscopy N/A 2024    COLONOSCOPY with polypectomy, sclerotherapy, cautery and clipping x 5    Colonoscopy N/A 3/18/2024    Procedure: COLONOSCOPY;  Surgeon: Darin Carreno MD;  Location: [facility] END[facility]    Egd N/A 2024    ESOPHAGOGASTRODUODENOSCOPY (EGD) with biopsies    Electrocardiogram, complete  2014    Scanned to Media Tab - Date of Service 2014    Other surgical history      laporoscopy    Other surgical history      amniocentesis    Skin surgery      excision of pilonidal cyst X2`    Skin surgery      several cyst excision     Tonsillectomy      Total hip replacement         Prescriptions Prior to Admission[1]  Current Medications and Prescriptions Ordered in Epic[2]    Allergies[3]    Family History   Problem Relation Age of Onset    Heart Disease Father         CAD (cause of death)    Cancer Father         colon    Cancer Mother         skin    Other (Other) Mother         Aortic aneurysm (cause of death)    Cancer Brother      Social History     Socioeconomic History    Marital status:    Tobacco Use    Smoking status: Former     Current packs/day: 0.00     Average packs/day: 1 pack/day for 17.0 years (17.0 ttl pk-yrs)     Types: Cigarettes     Start date: 1989     Quit date: 2006     Years since quittin.0    Smokeless tobacco: Never   Vaping Use    Vaping status: Never Used   Substance and Sexual Activity    Alcohol use: Yes     Alcohol/week: 4.0 standard drinks of alcohol     Types: 4 Cans of beer per week     Comment: occasionally    Drug use: No   Other Topics Concern    Currently spends a great deal of time in the sun No    Past Sunlamp Treatments for Acne  No    History of tanning No    Hx of Spending Great Deal of Time in Sun No    Bad sunburns in the past No    Tanning Salons in the Past Yes     Comment: 20 years ago    Hx of Radiation Treatments No    Regular use of sun block No    Caffeine Concern Yes     Comment: Soda rarely    Exercise No       Available pre-op labs reviewed.             Vital Signs:  Body mass index is 38.84 kg/m².   height is 1.702 m (5' 7\") and weight is 112.5 kg (248 lb). Her blood pressure is 147/83 and her pulse is 74. Her respiration is 17 and oxygen saturation is 96%.   Vitals:    03/28/25 1249 04/04/25 0900   BP:  147/83   Pulse:  74   Resp:  17   SpO2:  96%   Weight: 112.5 kg (248 lb)    Height: 1.702 m (5' 7\")         Anesthesia Evaluation     Patient summary reviewed and Nursing notes reviewed    No history of anesthetic complications   Airway   Mallampati: III  TM distance: >3 FB  Neck ROM: full  Dental - Dentition appears grossly intact     Pulmonary - negative ROS and normal exam   Cardiovascular - normal exam  (+) hypertension    Neuro/Psych    (+)  neuromuscular disease, anxiety/panic attacks,  depression      GI/Hepatic/Renal    (+) GERD, bowel prep    Endo/Other - negative ROS   Abdominal                  Anesthesia Plan:   ASA:  2  Plan:   MAC  Informed Consent Plan and Risks Discussed With:  Patient      I have informed Vinita RAQUEL Booth and/or legal guardian or family member of the nature of the anesthetic plan, benefits, risks including possible dental damage if relevant, major complications, and any alternative forms of anesthetic management.   All of the patient's questions were answered to the best of my ability. The patient desires the anesthetic management as planned.  Chirag Jonas MD  4/4/2025 9:17 AM  Present on Admission:  **None**           [1]   Medications Prior to Admission   Medication Sig Dispense Refill Last Dose/Taking    mupirocin 2 % External Ointment Apply to affect lesions tid 30 g 0 4/3/2025     PARoxetine 10 MG Oral Tab Take 2 tablets (20 mg total) by mouth every evening. 180 tablet 0 4/3/2025    telmisartan 80 MG Oral Tab Take 1 tablet (80 mg total) by mouth daily. 90 tablet 0 2025 Morning    OMEPRAZOLE 40 MG Oral Capsule Delayed Release TAKE 1 CAPSULE(40 MG) BY MOUTH DAILY BEFORE BREAKFAST 90 capsule 4 4/3/2025    Na Sulfate-K Sulfate-Mg Sulf (SUPREP BOWEL PREP KIT) 17.5-3.13-1.6 GM/177ML Oral Solution Take as directed 1 each 0 4/3/2025    hydroCHLOROthiazide 12.5 MG Oral Cap Take 1 capsule (12.5 mg total) by mouth daily. For high blood pressure. 90 capsule 3 4/3/2025    ALPRAZolam 0.25 MG Oral Tab Take 1 tablet (0.25 mg total) by mouth 2 (two) times daily. 16 tablet 0 3/31/2025    albuterol 108 (90 Base) MCG/ACT Inhalation Aero Soln Inhale 2 puffs into the lungs 3 (three) times daily as needed for Wheezing or Shortness of Breath. 1 each 0 3/31/2025    clotrimazole-betamethasone 1-0.05 % External Cream Apply 1 Application topically 2 (two) times daily. 45 g 3 4/3/2025    [] cephALEXin 500 MG Oral Cap Take 1 capsule (500 mg total) by mouth 4 (four) times daily for 10 days. 40 capsule 0    [2]   Current Facility-Administered Medications Ordered in Epic   Medication Dose Route Frequency Provider Last Rate Last Admin    lactated ringers infusion   Intravenous Continuous Darin Carreno MD         No current [facility]-ordered outpatient medications on file.   [3] No Known Allergies

## 2025-04-08 ENCOUNTER — TELEPHONE (OUTPATIENT)
Facility: CLINIC | Age: 65
End: 2025-04-08

## 2025-04-08 NOTE — TELEPHONE ENCOUNTER
Patient contacted,date of birth verified and results  from Dr. Carreno given.    Health maintenance and specialty comments updated.  3 year colonoscopy/EGD recall placed in patient outreach.Next due on 04/04/2028 per Dr. Carreno.

## 2025-04-08 NOTE — TELEPHONE ENCOUNTER
----- Message from Darin Carreno sent at 4/8/2025  2:52 PM CDT -----  I wanted to get back to you with your colonoscopy and EGD results.      You had 3 colon polyps removed which were benign.  I would advise a repeat colonoscopy in 3 years to make sure no new polyps are forming.  You also have internal hemorrhoids and diverticulosis.      The upper endoscopy showed a hiatal hernia, samples taken from the esophagus did show the Barretts esophagus ( no dysplasia noted).  Repeat EGD in 3 years.

## 2025-05-02 ENCOUNTER — OFFICE VISIT (OUTPATIENT)
Dept: FAMILY MEDICINE CLINIC | Facility: CLINIC | Age: 65
End: 2025-05-02

## 2025-05-02 VITALS
DIASTOLIC BLOOD PRESSURE: 82 MMHG | HEART RATE: 81 BPM | SYSTOLIC BLOOD PRESSURE: 118 MMHG | TEMPERATURE: 99 F | WEIGHT: 246.81 LBS | HEIGHT: 67 IN | BODY MASS INDEX: 38.74 KG/M2

## 2025-05-02 DIAGNOSIS — F43.21 GRIEF: ICD-10-CM

## 2025-05-02 DIAGNOSIS — M48.07 SPINAL STENOSIS OF LUMBOSACRAL REGION: Primary | ICD-10-CM

## 2025-05-02 DIAGNOSIS — M47.816 ARTHRITIS, LUMBAR SPINE: ICD-10-CM

## 2025-05-02 DIAGNOSIS — Z12.31 VISIT FOR SCREENING MAMMOGRAM: ICD-10-CM

## 2025-05-02 DIAGNOSIS — I10 ESSENTIAL HYPERTENSION: ICD-10-CM

## 2025-05-02 DIAGNOSIS — E78.2 MIXED HYPERLIPIDEMIA: ICD-10-CM

## 2025-05-02 DIAGNOSIS — K63.5 POLYP OF COLON, UNSPECIFIED PART OF COLON, UNSPECIFIED TYPE: ICD-10-CM

## 2025-05-02 DIAGNOSIS — Z12.4 CERVICAL CANCER SCREENING: ICD-10-CM

## 2025-05-02 DIAGNOSIS — F32.A DEPRESSION, UNSPECIFIED DEPRESSION TYPE: ICD-10-CM

## 2025-05-02 PROCEDURE — G2211 COMPLEX E/M VISIT ADD ON: HCPCS | Performed by: FAMILY MEDICINE

## 2025-05-02 PROCEDURE — 99214 OFFICE O/P EST MOD 30 MIN: CPT | Performed by: FAMILY MEDICINE

## 2025-05-02 PROCEDURE — 3074F SYST BP LT 130 MM HG: CPT | Performed by: FAMILY MEDICINE

## 2025-05-02 PROCEDURE — 3079F DIAST BP 80-89 MM HG: CPT | Performed by: FAMILY MEDICINE

## 2025-05-02 PROCEDURE — 3008F BODY MASS INDEX DOCD: CPT | Performed by: FAMILY MEDICINE

## 2025-05-02 RX ORDER — HYDROCHLOROTHIAZIDE 12.5 MG/1
12.5 CAPSULE ORAL DAILY
Qty: 90 CAPSULE | Refills: 3 | Status: SHIPPED | OUTPATIENT
Start: 2025-05-02

## 2025-05-02 RX ORDER — PAROXETINE 20 MG/1
20 TABLET, FILM COATED ORAL DAILY
Qty: 90 TABLET | Refills: 3 | Status: SHIPPED | OUTPATIENT
Start: 2025-05-02 | End: 2026-04-27

## 2025-05-02 RX ORDER — TELMISARTAN 80 MG/1
80 TABLET ORAL DAILY
Qty: 90 TABLET | Refills: 3 | Status: SHIPPED | OUTPATIENT
Start: 2025-05-02

## 2025-05-02 RX ORDER — PAROXETINE 10 MG/1
20 TABLET, FILM COATED ORAL EVERY EVENING
Qty: 180 TABLET | Refills: 0 | Status: CANCELLED | OUTPATIENT
Start: 2025-05-02

## 2025-05-02 NOTE — PROGRESS NOTES
Patient ID: Vinita Booth is a 64 year old female.         The following individual(s) verbally consented to be recorded using ambient AI listening technology and understand that they can each withdraw their consent to this listening technology at any point by asking the clinician to turn off or pause the recording:    Patient name: Vinita Booth  Additional names:           HPI  Chief Complaint   Patient presents with    Medication Request       History of Present Illness  Vinita Booth is a 64 year old female who presents for a follow-up visit.    She is currently on telmisartan 80 mg and hydrochlorothiazide 12.5 mg for hypertension, with her blood pressure well-controlled at 118/82 mmHg. She is satisfied with her current medication regimen and plans to continue with these medications.    She is taking paroxetine for depression, initially prescribed following the death of her  last year. She is not currently experiencing depression. She was initially prescribed 10 mg but has been taking two tablets to make up 20 mg daily.    She experiences joint pain, particularly in her spine, which she attributes to arthritis. She has had both hips replaced and uses a cane primarily due to spinal issues. She has scoliosis and spinal stenosis, which she describes as 'so much arthritis' affecting her lumbar spine. Reports joint pain in the spine and shoulders. No unintended weight loss, blood in stool, or black tarry stools. Reports hemorrhoids.    She recently underwent a colonoscopy, which revealed more polyps. She expresses concern due to a family history of stomach cancer, as her father  from the disease.    She mentions a recent infection on her elbow for which she received antibiotics. She also notes that her blood pressure was high during a recent visit to a podiatrist, but it has since normalized.    Wt Readings from Last 6 Encounters:   25 246 lb 12.8 oz (111.9 kg)   25 248 lb (112.5 kg)    02/26/25 248 lb (112.5 kg)   03/09/24 220 lb (99.8 kg)   03/05/24 231 lb (104.8 kg)   02/20/24 231 lb (104.8 kg)       BMI Readings from Last 6 Encounters:   05/02/25 38.65 kg/m²   03/28/25 38.84 kg/m²   02/26/25 38.84 kg/m²   03/09/24 34.46 kg/m²   03/05/24 36.18 kg/m²   02/20/24 36.18 kg/m²       BP Readings from Last 6 Encounters:   05/02/25 118/82   04/04/25 131/85   02/26/25 132/76   03/18/24 110/76   03/05/24 130/84   02/20/24 126/85       Results  DIAGNOSTIC  Colonoscopy: Multiple polyps detected    Review of Systems  No exertional cardiac chest pain or shortness of breath unless stated in HPI which would take precedence.  See HPI for further review of systems.        Medical History:      Past Medical History:    Anxiety state    Back problem    Constipation    Dermatological disorder    per [employer]:  \"removal of cyst under the chin\"    Dermatological disorder    per NextGen:  \"removal of cyst under left arm\"    Diverticular disease    Hemorrhoids    High blood pressure    Osteoarthritis    Pilonidal cyst    Unspecified essential hypertension       Past Surgical History:   Procedure Laterality Date    Colonoscopy N/A 03/18/2024    COLONOSCOPY with polypectomy, sclerotherapy, cautery and clipping x 5    Colonoscopy N/A 3/18/2024    Procedure: COLONOSCOPY;  Surgeon: Darin Carreno MD;  [facility]: [facility] ENDO    Colonoscopy N/A 4/4/2025    Procedure: COLONOSCOPY with Polypectomy;  Surgeon: Darin Carreno MD;  Location: [facility] ENDO    Egd N/A 03/18/2024    ESOPHAGOGASTRODUODENOSCOPY (EGD) with biopsies    Electrocardiogram, complete  06/16/2014    Scanned to Media Tab - Date of Service 06-    Other surgical history      laporoscopy    Other surgical history      amniocentesis    Skin surgery      excision of pilonidal cyst X2`    Skin surgery      several cyst excision     Tonsillectomy      Total hip replacement            Current Outpatient Medications   Medication Sig Dispense Refill    PARoxetine  10 MG Oral Tab Take 2 tablets (20 mg total) by mouth every evening. 180 tablet 0    telmisartan 80 MG Oral Tab Take 1 tablet (80 mg total) by mouth daily. 90 tablet 0    OMEPRAZOLE 40 MG Oral Capsule Delayed Release TAKE 1 CAPSULE(40 MG) BY MOUTH DAILY BEFORE BREAKFAST 90 capsule 4    hydroCHLOROthiazide 12.5 MG Oral Cap Take 1 capsule (12.5 mg total) by mouth daily. For high blood pressure. 90 capsule 3    ALPRAZolam 0.25 MG Oral Tab Take 1 tablet (0.25 mg total) by mouth 2 (two) times daily. 16 tablet 0    albuterol 108 (90 Base) MCG/ACT Inhalation Aero Soln Inhale 2 puffs into the lungs 3 (three) times daily as needed for Wheezing or Shortness of Breath. 1 each 0    mupirocin 2 % External Ointment Apply to affect lesions tid (Patient not taking: Reported on 5/2/2025) 30 g 0    clotrimazole-betamethasone 1-0.05 % External Cream Apply 1 Application topically 2 (two) times daily. (Patient not taking: Reported on 5/2/2025) 45 g 3     Allergies:No Known Allergies     Physical Exam:       Physical Exam  Blood pressure 118/82, pulse 81, temperature 98.7 °F (37.1 °C), temperature source Tympanic, height 5' 7\" (1.702 m), weight 246 lb 12.8 oz (111.9 kg), not currently breastfeeding.       Physical Exam   Constitutional: Patient is oriented to person, place, and time. Patient appears well-developed and well-nourished. No distress.  Antalgic gait and uses a cane.  Has a hard time getting up from the chair to go up onto the table but is able to do so.  HENT:   Head: Normocephalic and atraumatic.   Neck: Normal range of motion. Neck supple. No thyromegaly present.   Lymphadenopathy:     Has no cervical adenopathy.   Cardiovascular: Normal rate, regular rhythm and no murmur heard.  Pulmonary/Chest: Effort normal and breath sounds normal. No respiratory distress.   Neurological: Patient is alert and oriented to person, place, and time.   Skin: Skin is warm and dry.  No pallor or diaphoresis.  Psychiatric: Patient has a normal  mood and affect.   Legs: No pitting edema.    Nursing note and vitals reviewed.      Physical Exam  VITALS: BP- 118/82  HEENT: Normal oropharynx, moist mucous membranes.  NECK: Supple, no cervical lymphadenopathy, no nuchal rigidity.  CHEST: Clear to auscultation bilaterally.  CARDIOVASCULAR: Normal heart rate and rhythm.      Assessment/Plan:        Diagnoses and all orders for this visit:    Spinal stenosis of lumbosacral region  -     C-Reactive Protein; Future  -     Sed Rate, Westergren (Automated); Future  -     Connective Tissue Disease (IVANA) Screen, Reflex Specific Antibody; Future  -     Rheumatoid Arthritis Factor; Future  -     HLA B 27 Disease Association [E]; Future  We will order some inflammatory tests but I wonder if much of this is osteoarthritis.  Essential hypertension  -     telmisartan 80 MG Oral Tab; Take 1 tablet (80 mg total) by mouth daily.  -     hydroCHLOROthiazide 12.5 MG Oral Cap; Take 1 capsule (12.5 mg total) by mouth daily. For high blood pressure.  -     CBC With Differential With Platelet; Future  -     Comp Metabolic Panel (14); Future  Beautifully controlled.  Continue present management  Depression, unspecified depression type  -     PARoxetine 20 MG Oral Tab; Take 1 tablet (20 mg total) by mouth daily.  Paxil seems to be working very nicely.  Grief  -     PARoxetine 20 MG Oral Tab; Take 1 tablet (20 mg total) by mouth daily.  As above  Arthritis, lumbar spine  -     C-Reactive Protein; Future  -     Sed Rate, Westergren (Automated); Future  -     Connective Tissue Disease (IVANA) Screen, Reflex Specific Antibody; Future  -     Rheumatoid Arthritis Factor; Future  -     HLA B 27 Disease Association [E]; Future    Visit for screening mammogram  -     [facility] AMY 2D+3D SCREENING BILAT (CPT=77067/65742); Future    Cervical cancer screening  -     OBG - INTERNAL    Mixed hyperlipidemia  -     Lipid Panel; Future  -     Assay, Thyroid Stim Hormone; Future    Polyp of colon, unspecified  part of colon, unspecified type  She already knows she needs to go back in 3 years for a colonoscopy      Referrals (if applicable)  Orders Placed This Encounter   Procedures    OBG - INTERNAL     Referral Priority:   Routine     Referral Type:   OFFICE VISIT     Referred to Provider:   Joan Naqvi MD     Requested Specialty:   OBSTETRICS & GYNECOLOGY     Number of Visits Requested:   3         Follow up if symptoms persist.  Take medicine (if given) as prescribed.  Approach to treatment discussed and patient/family member understands and agrees to plan.     No follow-ups on file.      Assessment & Plan  Spinal stenosis  Chronic condition due to arthritis causing narrowing of the spinal canal, leading to pressure on the spinal cord and nerves. Symptoms include back pain and use of a cane for mobility.    Osteoarthritis of lumbar spine  Chronic joint pain, particularly in the spine, likely due to age-related osteoarthritis. Symptoms include pain in the spine and shoulder. She uses a cane for mobility due to spinal issues.    Hypertension  Blood pressure is well-controlled at 118/82 mmHg. Current medication regimen is effective.  - Continue telmisartan 80 mg daily.  - Refill hydrochlorothiazide 12.5 mg daily.    Depression  Currently taking paroxetine 10 mg twice daily. No current symptoms of depression. Medication was initially prescribed following the death of her  last year. Transitioning to a single 20 mg dose for convenience.  - Increase paroxetine to 20 mg once daily.    Colon polyps  Recent colonoscopy revealed more polyps, but follow-up is recommended in three years. No current symptoms suggestive of malignancy. Discussed signs to monitor for potential complications.  - Monitor for symptoms such as unintended weight loss, blood in stool, or black tarry stools.    General Health Maintenance  Routine health maintenance discussed, including kidney function monitoring due to medications affecting  renal function.  - Order kidney function test, including potassium and sodium levels. She to complete test fasting, preferably on a Saturday.    Chandrakant Kelley DO  5/2/2025

## 2025-06-04 ENCOUNTER — PATIENT MESSAGE (OUTPATIENT)
Dept: FAMILY MEDICINE CLINIC | Facility: CLINIC | Age: 65
End: 2025-06-04

## 2025-06-05 NOTE — TELEPHONE ENCOUNTER
Left message to either call back or check [employer].    Dr. Carreno had recommended a high fiber diet.  According to [employer] about seeds/nuts:  \"Seeds and nuts -- Patients with diverticular disease have historically been advised to avoid whole pieces of fiber (such as seeds, corn, and nuts) because of concern that these foods could cause an episode of diverticulitis. However, this belief is completely unproven. We do not suggest that patients with diverticulosis avoid seeds, corn, or nuts.\"

## 2025-06-12 ENCOUNTER — TELEPHONE (OUTPATIENT)
Facility: CLINIC | Age: 65
End: 2025-06-12

## 2025-06-12 NOTE — TELEPHONE ENCOUNTER
Call transferred from front office to RN line because patient has questions.  Patient just wanted to know if she could have specifically strawberries, watermelon, and kiwi.  I let her know it is ok to eat these fruits.  She had no other questions.

## 2025-08-01 ENCOUNTER — OFFICE VISIT (OUTPATIENT)
Dept: PODIATRY CLINIC | Facility: CLINIC | Age: 65
End: 2025-08-01

## 2025-08-01 DIAGNOSIS — B35.1 ONYCHOMYCOSIS: ICD-10-CM

## 2025-08-01 DIAGNOSIS — L90.9 FAT PAD ATROPHY OF FOOT: ICD-10-CM

## 2025-08-01 DIAGNOSIS — L60.0 INGROWN TOENAIL: ICD-10-CM

## 2025-08-01 DIAGNOSIS — M79.674 PAIN IN TOES OF BOTH FEET: ICD-10-CM

## 2025-08-01 DIAGNOSIS — L84 CALLUS OF FOOT: ICD-10-CM

## 2025-08-01 DIAGNOSIS — R26.81 GAIT INSTABILITY: Primary | ICD-10-CM

## 2025-08-01 DIAGNOSIS — M79.675 PAIN IN TOES OF BOTH FEET: ICD-10-CM

## 2025-08-01 PROCEDURE — 99214 OFFICE O/P EST MOD 30 MIN: CPT | Performed by: STUDENT IN AN ORGANIZED HEALTH CARE EDUCATION/TRAINING PROGRAM

## (undated) DEVICE — PICO SINGLE USE NEGATIVE PRESSURE WOUND THERAPY SYSTEM 10CM X 30CM 4IN. X 12IN.: Brand: PICO

## (undated) DEVICE — NEEDLE HPO 18GA 1.5IN ECLPS

## (undated) DEVICE — SYRINGE MNJCT 35ML LF STRL LL

## (undated) DEVICE — STERILE LATEX POWDER-FREE SURGICAL GLOVESWITH NITRILE COATING: Brand: PROTEXIS

## (undated) DEVICE — SUCTION CANISTER, 3000CC,SAFELINER: Brand: DEROYAL

## (undated) DEVICE — MEDI-VAC NON-CONDUCTIVE SUCTION TUBING: Brand: CARDINAL HEALTH

## (undated) DEVICE — GLOVE SRG BIOGEL 8.0

## (undated) DEVICE — Device

## (undated) DEVICE — BATTERY

## (undated) DEVICE — PEN: MARKING STD PT 100/CS: Brand: MEDICAL ACTION INDUSTRIES

## (undated) DEVICE — ANTERIOR HIP: Brand: MEDLINE INDUSTRIES, INC.

## (undated) DEVICE — NET SPECIMEN RETRIEVAL BLUE

## (undated) DEVICE — BLADE SAW SAGITTAL 19.5

## (undated) DEVICE — TRAY FOLEY BDX 16F STATLOCK

## (undated) DEVICE — SOL  .9 1000ML BTL

## (undated) DEVICE — KIT DRN 1/8IN PVC 3 SPRG EVAC

## (undated) DEVICE — CONTAINER SPEC STR 4OZ GRY LID

## (undated) DEVICE — DRESSING SILVERLON ISLAND 11IN

## (undated) DEVICE — LASSO POLYPECTOMY SNARE: Brand: LASSO

## (undated) DEVICE — AGENT SUBMUCOSAL LIFTING 10ML INJ POLYPR

## (undated) DEVICE — SUTURE ETHIBOND 2 V-37

## (undated) DEVICE — SUTURE PDS II 2-0 CP

## (undated) DEVICE — SOL  .9 1000ML BAG

## (undated) DEVICE — NEEDLE INJ 25GA CATH 230CM CHN 2.8MM ACUJECT

## (undated) DEVICE — GAUZE SPONGES,12 PLY: Brand: CURITY

## (undated) DEVICE — SINGLE-USE POLYPECTOMY SNARE: Brand: CAPTIFLEX

## (undated) DEVICE — Device: Brand: JELCO

## (undated) DEVICE — STERILE POLYISOPRENE POWDER-FREE SURGICAL GLOVES: Brand: PROTEXIS

## (undated) DEVICE — POSITIONER OR KT PT CR

## (undated) DEVICE — SUTURE MONOCRYL 2-0 SH

## (undated) DEVICE — SNARE OPTMZ PLPCTM TRP

## (undated) DEVICE — KIT ENDO ORCAPOD 160/180/190

## (undated) DEVICE — GOWN SURG AERO BLUE PERF XLG

## (undated) DEVICE — DRAPE SHEET LG

## (undated) DEVICE — BACTISURE WOUND LAVAGE

## (undated) DEVICE — PHOTONSABER Y METAL TIP

## (undated) DEVICE — CONMED SCOPE SAVER BITE BLOCK, 20X27 MM: Brand: SCOPE SAVER

## (undated) DEVICE — V2 SPECIMEN COLLECTION MANIFOLD KIT: Brand: NEPTUNE

## (undated) DEVICE — GIJAW SINGLE-USE BIOPSY FORCEPS WITH NEEDLE: Brand: GIJAW

## (undated) DEVICE — REM POLYHESIVE ADULT PATIENT RETURN ELECTRODE: Brand: VALLEYLAB

## (undated) DEVICE — TRAP POLYP W/ 2 SPEC TY CLR MAGNIFYING WIND

## (undated) DEVICE — PERINEAL POST PAD 1

## (undated) DEVICE — HEXAGONAL CAPTIVATOR STIFF 13M

## (undated) DEVICE — SPONGE LAP 18X18 XRAY STRL

## (undated) DEVICE — WEBRIL COTTON UNDERCAST PADDING: Brand: WEBRIL

## (undated) DEVICE — SUTURE VICRYL 2-0 CT-1

## (undated) DEVICE — YANKAUER SUCTION INSTRUMENT NO CONTROL VENT, BULB TIP, CLEAR: Brand: YANKAUER

## (undated) DEVICE — 3M™ STERI-DRAPE™ U-DRAPE 1015: Brand: STERI-DRAPE™

## (undated) DEVICE — SOL  .9 3000ML

## (undated) DEVICE — MEDI-VAC NON-CONDUCTIVE SUCTION TUBING 6MM X 1.8M (6FT.) L: Brand: CARDINAL HEALTH

## (undated) DEVICE — 450 ML BOTTLE OF 0.05% CHLORHEXIDINE GLUCONATE IN 99.95% STERILE WATER FOR IRRIGATION, USP AND APPLICATOR.: Brand: IRRISEPT ANTIMICROBIAL WOUND LAVAGE

## (undated) DEVICE — 60 ML SYRINGE REGULAR TIP: Brand: MONOJECT

## (undated) DEVICE — KIT CLEAN ENDOKIT 1.1OZ GOWNX2

## (undated) DEVICE — T5 HOOD WITH PEEL AWAY FACE SHIELD

## (undated) DEVICE — YANKAUER,BULB TIP,W/O VENT,RIGID,STERILE: Brand: MEDLINE

## (undated) DEVICE — Device: Brand: DUAL NARE NASAL CANNULAE FEMALE LUER CON 7FT O2 TUBE

## (undated) DEVICE — PROXIMATE SKIN STAPLERS (35 WIDE) CONTAINS 35 STAINLESS STEEL STAPLES (FIXED HEAD): Brand: PROXIMATE

## (undated) DEVICE — FEMORAL CANAL BRUSH, IRRIGATION/SUCTION

## (undated) DEVICE — SUTURE ETHILON 3-0 669H

## (undated) DEVICE — SUTURE PROLENE 1 CTX

## (undated) DEVICE — BIPOLAR SEALER 23-112-1 AQM 6.0: Brand: AQUAMANTYS™

## (undated) DEVICE — BREAKAWAY NOZZLE: Brand: REVOLUTION

## (undated) DEVICE — 2T11 #2 PDO 36 X 36: Brand: 2T11 #2 PDO 36 X 36

## (undated) DEVICE — CHLORAPREP 26ML APPLICATOR

## (undated) NOTE — LETTER
10/15/2019          To Whom It May Concern:    Lex Metzger is currently under my medical care and was seen in the office today. She may return to work on 10/15/19. If you require additional information please contact our office.         Sincerely,

## (undated) NOTE — LETTER
Dear Dr. Felix Him    This letter is to inform you that Kwadwo Angel has been attending Physical Therapy with me. See below for my most recent plan of care.     Diagnosis: Presence of left artificial hip joint (P56.743)    Date of surgery: 8/7/2018 Plan: Continue PT. Patient has one more visit on current referral and to see MD on Monday.        Charges: EX 3       Total Timed Treatment: 45 min  Total Treatment Time: 45 min

## (undated) NOTE — LETTER
Eunice Velasquez may benefit from continued PT to progress her rehab and improve overall mobility. She will need a new HMO referral for 12 more PT visits to continue her rehab without interruption. See below for my most recent plan of care. Thank you. neutral  Strength is grossly 3+/5 into all planes of L hip motion. Grossly 4/5 throughout L LE  Gait: walks independently without a device. Uses cane for community ambulation or during prolonged walking. Demonstrates slight decrease in push off on L LE.  Go

## (undated) NOTE — Clinical Note
Dear Dr. Porsche East    This letter is to inform you that Cindy Black has been attending Physical Therapy with me. See below for my most recent plan of care.     Patient Name: Cindy Black, : 6/15/1960, MRN: [id number]   Date:  2017  Referri

## (undated) NOTE — MR AVS SNAPSHOT
[address]  [employer]  [phone]               Thank you for choosing us for your health care visit with Yovani White PT.   We are glad to serve you and happy to provide you Call the [employer] for assistance with your inactive [employer] account    If you have questions, you can call [phone] to talk to our [facility] Staff. Remember, [employer] is NOT to be used for urgent needs. For medical emergencies, dial 911.     Vi

## (undated) NOTE — LETTER
Patient Name: Marley Gaffney  YOB: 1960          MRN number:  [id number]  Date:  9/6/2018  Referring Physician: Dr. David Lyles EVALUATION:   Referring Physician: Dr. Todd De Leon  Diagnosis: Presence of left artificial hip joint (B76.027 Strength/MMT: L hip strength is grossly 3/5. L knee strength is grossly 3+/5. Sensation: WNL throughout    Posture: Stands with hip and knee in slight flexion. Leans backward when sitting.     Balance: WFL    Gait: Walks independently with cane or hong via fax as soon as possible to [phone].  If you have any questions, please contact me at Dept: [phone]    Sincerely,  Electronically signed by therapist: Sirisha Coley PT    Physician's certification required: Yes  I certify the need for these s

## (undated) NOTE — LETTER
Dear Dr. Kailash Rodriguez    This letter is to inform you that Saleem Romero has been attending Physical Therapy with me. See below for my most recent plan of care.         Physical Therapy Progress Report    Patient Name: Samuel Mullen, MRN: [id number] Gait: walks independently without a device. Uses cane for community ambulation or during prolonged walking. Demonstrates slight decrease in push off on L LE. Goes up and down stairs reciprocally but needs rail support.       Goals     • Therapy Goals      1

## (undated) NOTE — MR AVS SNAPSHOT
[address]  [place]  [phone]               Thank you for choosing us for your health care visit with Primo Ortega PT.   We are glad to serve you and happy to provide you Call the [employer] for assistance with your inactive [employer] account    If you have questions, you can call [phone] to talk to our [facility] Staff. Remember, [employer] is NOT to be used for urgent needs. For medical emergencies, dial 911.     Vi

## (undated) NOTE — LETTER
Patient's current referral expires on 12/31. May benefit from continued PT to progress therapeutic exercises and return normal walking. Please extend referral for 8 more PT visits until 2019 so patient can continue with PT without interruption.     Attached during prolonged walking. Demonstrates trunk flexed posture when walking. Goes up and down stairs reciprocally but needs rail support. Goals     • Therapy Goals      1. Patient will be independent with HEP and its progression.   2. Increase L LE ROM to

## (undated) NOTE — IP AVS SNAPSHOT
[facility]            (For Outpatient Use Only) Initial Admit Date: 7/3/2018   Inpt/Obs Admit Date: Inpt: 7/3/18 / Obs: N/A   Discharge Date:    Saleem Pouch:  [de-identified]   MRN: [de-identified]   CSN: [id number]        ENCOUNTER  Patient Class: Subscriber ID:  Pt Rel to Subscriber:    Hospital Account Financial Class: [facility]    July 7, 2018

## (undated) NOTE — LETTER
To whom it may concern,     This letter is to inform you that Vazquez Meneses has been attending Physical Therapy with me. Patient has utilized 11 of 12 visits on current referral requesting 10 more visits. See below for my most recent plan of care. patient response to today's treatment. Plan: Continue PT. Patient has one more visit on current referral and to see MD on Monday.        Charges: EX 3       Total Timed Treatment: 45 min  Total Treatment Time: 45 min

## (undated) NOTE — MR AVS SNAPSHOT
[address]  [place]  [phone]               Thank you for choosing us for your health care visit with Yovani White PT.   We are glad to serve you and happy to provide you Nate Physical Therapy Visit By Therapist with Joe Castillo, [address] [facility] ([facility])    [address]   [phone]           Please arrive at your scheduled appoint

## (undated) NOTE — MR AVS SNAPSHOT
[address]  [facility] [address] [address]  [phone]  [phone]               Thank you for choosing us for your health care visit with Ezio Lomeli MD.  We are glad to serve you and happy to provide you wi

## (undated) NOTE — LETTER
[facility]  Administration of Anesthesia  CHARLA Vinita Booth agree to be cared for by a physician anesthesiologist alone and/or with a nurse anesthetist, who is specially trained to monitor me and give me medicine to put me to sleep or keep me comfortable during my procedure    I understand that my anesthesiologist and/or anesthetist is not an employee or agent of [facility] or [employer]. He or she works for [facility]    As the patient asking for anesthesia services, I agree to:  Allow the anesthesiologist (anesthesia doctor) to give me medicine and do additional procedures as necessary. Some examples are: Starting or using an “IV” to give me medicine, fluids or blood during my procedure, and having a breathing tube placed to help me breathe when I’m asleep (intubation). In the event that my heart stops working properly, I understand that my anesthesiologist will make every effort to sustain my life, unless otherwise directed by [facility] Do Not Resuscitate documents.  Tell my anesthesia doctor before my procedure:  If I am pregnant.  The last time that I ate or drank.  iii. All of the medicines I take (including prescriptions, herbal supplements, and pills I can buy without a prescription (including street drugs/illegal medications). Failure to inform my anesthesiologist about these medicines may increase my risk of anesthetic complications.  iv.If I am allergic to anything or have had a reaction to anesthesia before.  I understand how the anesthesia medicine will help me (benefits).  I understand that with any type of anesthesia medicine there are risks:  The most common risks are: nausea, vomiting, sore throat, muscle soreness, damage to my eyes, mouth, or teeth (from breathing tube placement).  Rare risks include: remembering what happened during my procedure, allergic reactions to medications, injury to my airway, heart, lungs, vision, nerves, or  muscles and in extremely rare instances death.  My doctor has explained to me other choices available to me for my care (alternatives).  Pregnant Patients (“epidural”):  I understand that the risks of having an epidural (medicine given into my back to help control pain during labor), include itching, low blood pressure, difficulty urinating, headache or slowing of the baby’s heart. Very rare risks include infection, bleeding, seizure, irregular heart rhythms and nerve injury.  Regional Anesthesia (“spinal”, “epidural”, & “nerve blocks”):  I understand that rare but potential complications include headache, bleeding, infection, seizure, irregular heart rhythms, and nerve injury.    _____________________________________________________________________________  Patient (or Representative) Signature/Relationship to Patient  Date   Time    _____________________________________________________________________________   Name (if used)    Language/Organization   Time    _____________________________________________________________________________  Nurse Anesthetist Signature     Date   Time  _____________________________________________________________________________  Anesthesiologist Signature     Date   Time  I have discussed the procedure and information above with the patient (or patient’s representative) and answered their questions. The patient or their representative has agreed to have anesthesia services.    _____________________________________________________________________________  Witness        Date   Time  I have verified that the signature is that of the patient or patient’s representative, and that it was signed before the procedure  Patient Name: Vinita Booth     : 6/15/1960                 Printed: 3/15/2024 at 6:46 AM    Medical Record #: [id number]                                            Page 1 of 1  ----------ANESTHESIA CONSENT----------

## (undated) NOTE — IP AVS SNAPSHOT
Patient Demographics     Address  [address] Phone  [phone] [facility])  [phone] (Work)  [phone] [facility]) *Preferred* E-mail Address  [email or web address]      Emergency Contact(s)     Name Relation Home Work Mobile Take 1 tablet by mouth once daily. Chandrakant Kelley, DO         Montelukast Sodium 10 MG Tabs  Commonly known as:  SINGULAIR  Next dose due:  TONIGHT      Take 1 tablet (10 mg total) by mouth nightly.    Chandrakant Kelley DO         Ondansetron HCl 4 mg tablet [id number] ceFAZolin sodium (ANCEF/KEFZOL) 2 GM/20ML premix IV syringe 2 g 07/06/18 1242 Given      [id number] ceFAZolin sodium (ANCEF/KEFZOL) 2 GM/20ML premix IV syringe 2 g 07/06/18 2033 Given      [id number] ceFAZolin sodium (ANCEF/KEFZOL) 2 GM/20ML premi Type Source Collected On   Blood — 07/07/18 0848            Testing Performed By     Lab - Abbreviation Name Director Address Valid Date Range    [address] [facility] [place] [facility] Yg Flavors. Bharath Lara M.D. [facility] [address] [place] [id number] 04/29/14 1547 - • Dermatological disorder     per [employer]:  \"removal of cyst under left arm\"   • High blood pressure    • Osteoarthritis    • Pilonidal cyst    • Unspecified essential hypertension[VT. 1]      Past Surgical History:  06-: ELECTROCARDIOGRAM, COMPLET Spacer/Aero Chamber Mouthpiece Does not apply Misc Use with HFA inhaler as directed Disp: 1 each Rfl: 0     No results found for: PEPPER, EUGENE, ISABEL, ZEB, IMAN, [address] [facility], [facility] [place], [place] Left hip wound staples intact, serous drainage from the superior aspect of the wound, with tenderness to palpation. Neurovascularly intact. Some hip flexor weakness noted.      Plan is to take back to the OR for left hip incision and drainage, possible po Diagnosis Date   • Anxiety state    • Back problem    • Dermatological disorder     per [employer]:  \"removal of cyst under the chin\"   • Dermatological disorder     per [employer]:  \"removal of cyst under left arm\"   • High blood pressure    • Osteoarthriti •  HYDROmorphone HCl (DILAUDID) 1 MG/ML injection 0.6 mg, 0.6 mg, Intravenous, Q5 Min PRN  •  Atropine Sulfate 0.1 MG/ML injection 0.5 mg, 0.5 mg, Intravenous, PRN  •  ondansetron HCl (ZOFRAN) injection 4 mg, 4 mg, Intravenous, Once PRN  •  Prochlorperazin Integument: No lesions. No erythema. Laboratory Data:    No results for input(s): RBC, HGB, HCT, MCV, MCH, MCHC, RDW, NEPRELIM, WBC, PLT, NEUT, LYMPH, MON, EOS, NRBC in the last 168 hours.   No results for input(s): GLU, BUN, CREATSERUM, GFRAA, GFRNAA, C D/C Summary     No notes of this type exist for this encounter.          Physical Therapy Notes (last 72 hours) (Notes from 7/4/2018 12:30 PM through 7/7/2018 12:30 PM)      Physical Therapy Note signed by Katiuska Ludwig PTA at 7/6/2018  3:25 PM  V How much difficulty does the patient currently have. .. [CK.1]  -   Turning over in bed (including adjusting bedclothes, sheets and blankets)?: A Little   -   Sitting down on and standing up from a chair with arms (e.g., wheelchair, bedside commode, etc.): A Physical Therapy Note signed by Denise Reyes PTA at 7/5/2018  4:15 PM  Version 1 of 1    Author:  Denise Reyes PTA Service:  Rehab Author Type:  Physical Therapist    Filed:  7/5/2018  4:15 PM Date of Service:  7/5/2018  4:04 PM Status:  Gautam Ramires wheelchair, bedside commode, etc.): A Lot   -   Moving from lying on back to sitting on the side of the bed?: A Lot   How much help from another person does the patient currently need. ..   -   Moving to and from a bed to a chair (including a wheelchair)?: Room Number: 425/425-A  Evaluation Date: 7/4/2018  Type of Evaluation: Initial  Physician Order: PT Eval and Treat    Presenting Problem: post-op seroma of musculoskeletal structure s/ L TRAVIS with persistent drainage and increased pain  Reason for Therapy: Weight Bearing Restriction: L lower extremity           L Lower Extremity: Partial Weight Bearing (30%)    PAIN ASSESSMENT  Rating: 10  Location: L hip  Management Techniques:  Activity promotion;Relaxation;Repositioning    COGNITION  Pt is alert and orient Patient End of Session: Up in chair;Needs met;Call light within reach;RN aware of session/findings; All patient questions and concerns addressed; Alarm set    ASSESSMENT   Patient is a 66year old female admitted on 7/3/2018 for post op seroma of musculoskel Patient will negotiate 6 stairs, 1 rail, min A   Goal #5    Patient verbalizes and/or demonstrates all precautions and safety concerns independently   Goal #6        Goal Comments: Goals established on 7/4/2018[AN.1]     Attribution Bone    AN. 1 - Ltanya Liner, with Zahra Mcdaniel from social work regarding patient status and recommendation.      DISCHARGE RECOMMENDATIONS  OT Discharge Recommendations: Sub-acute rehabilitation  OT Device Recommendations: Reacher;Sock aid;Long-handled shoehorn;Long-handled sponge;Raised t Education Provided: Educated patient in role of OT and POC  Patient End of Session: Up in chair;Needs met;Call light within reach;RN aware of session/findings; All patient questions and concerns addressed    OT Goals:      Patient will complete LE dressing Pt tolerated standing at sink to wash hair in sink with assist from staff, 5 min. Pt was able to wash front of body with set up and sponge wash, and staff assisted with back. Increased time for task.  Min assist for LE dressing with increased time with inst -   Eating meals?: None    AM-PAC Score:  Score: 21  Approx Degree of Impairment: 32.79%  Standardized Score (AM-PAC Scale): 44.27  CMS Modifier (G-Code): CJ    FUNCTIONAL TRANSFER ASSESSMENT  Supine to Sit : Moderate assistance  Sit to Stand: Minimum assi Therapist    Filed:  7/4/2018  2:29 PM Date of Service:  7/4/2018  2:15 PM Status:  Signed    :  Dilip Mcmahan OT (Occupational Therapist)       OCCUPATIONAL THERAPY EVALUATION - INPATIENT      Room Number: 425/425-A  Evaluation Date: 7/4/2018  Type o simplification techniques;ADL training;IADL training;Functional transfer training;UE strengthening/ROM; Endurance training;Patient/Family education;Patient/Family training;Neuromuscluar reeducation; Compensatory technique education       OCCUPATIONAL THERAPY WEIGHT BEARING RESTRICTION  Weight Bearing Restriction: L lower extremity           L Lower Extremity: Partial Weight Bearing (30%)    PAIN ASSESSMENT             ACTIVITY TOLERANCE  Poor d/t anxious behavior and muscle spasm    COGNITION  Able to redirect Patient will complete LE dressing with MOD I   Comment:     Patient will complete toilet transfer with MOD I   Comment:     Patient will complete self care task at sink level with MOD I    Comment:    Patient will independently recall global prec   Commen

## (undated) NOTE — MR AVS SNAPSHOT
[address]  [address] [facility]  [phone]               Thank you for choosing us for your health care visit with Nico Herr DO.   We are glad to serve you and happy to provide you with this summary If you have questions, you can call [phone] to talk to our [facility] Staff. Remember, [employer] is NOT to be used for urgent needs. For medical emergencies, dial 911. Visit [email or web address] to learn more.            Visit [facility]

## (undated) NOTE — MR AVS SNAPSHOT
[address]  [address] [facility]  [phone]               Thank you for choosing us for your health care visit with Trip Ignacio DO.   We are glad to serve you and happy to provide you with this summary If you are confident that your benefit plan will not require a referral or authorization, such as [place] Medicaid, please feel free to schedule your appointment immediately.  However, if you are unsure about the requirements for authorization, please wait  Physician goals: Pain relief, Increased Function, Activities of daily living and Education   Frequency: 2-3 times per week. ...... [place] Duration: 4-6 wks. ...... [place] Number of visits: 9      Referral Information     Referral Order Referred to [address]

## (undated) NOTE — LETTER
[facility]  [address], [place], [place]  Authorization for Surgical Operation and Procedure                                                                                           I hereby authorize Darin Carreno MD, my physician and his/her assistants (if applicable), which may include medical students, residents, and/or fellows, to perform the following surgical operation/ procedure and administer such anesthesia as may be determined necessary by my physician: Operation/Procedure name (s) COLONOSCOPY/ ESOPHAGOGASTRODUODENOSCOPY (EGD) on Vinita GARCÍA Booth   2.   I recognize that during the surgical operation/procedure, unforeseen conditions may necessitate additional or different procedures than those listed above.  I, therefore, further authorize and request that the above-named surgeon, assistants, or designees perform such procedures as are, in their judgment, necessary and desirable.    3.   My surgeon/physician has discussed prior to my surgery the potential benefits, risks and side effects of this procedure; the likelihood of achieving goals; and potential problems that might occur during recuperation.  They also discussed reasonable alternatives to the procedure, including risks, benefits, and side effects related to the alternatives and risks related to not receiving this procedure.  I have had all my questions answered and I acknowledge that no guarantee has been made as to the result that may be obtained.    4.   Should the need arise during my operation/procedure, which includes change of level of care prior to discharge, I also consent to the administration of blood and/or blood products.  Further, I understand that despite careful testing and screening of blood or blood products by collecting agencies, I may still be subject to ill effects as a result of receiving a blood transfusion and/or blood products.  The following are some, but not all, of the potential risks that can  occur: fever and allergic reactions, hemolytic reactions, transmission of diseases such as Hepatitis, AIDS and Cytomegalovirus (CMV) and fluid overload.  In the event that I wish to have an autologous transfusion of my own blood, or a directed donor transfusion, I will discuss this with my physician.  Check only if Refusing Blood or Blood Products  I understand refusal of blood or blood products as deemed necessary by my physician may have serious consequences to my condition to include possible death. I hereby assume responsibility for my refusal and release the hospital, its personnel, and my physicians from any responsibility for the consequences of my refusal.    o  Refuse   5.   I authorize the use of any specimen, organs, tissues, body parts or foreign objects that may be removed from my body during the operation/procedure for diagnosis, research or teaching purposes and their subsequent disposal by hospital authorities.  I also authorize the release of specimen test results and/or written reports to my treating physician on the hospital medical staff or other referring or consulting physicians involved in my care, at the discretion of the Pathologist or my treating physician.    6.   I consent to the photographing or videotaping of the operations or procedures to be performed, including appropriate portions of my body for medical, scientific, or educational purposes, provided my identity is not revealed by the pictures or by descriptive texts accompanying them.  If the procedure has been photographed/videotaped, the surgeon will obtain the original picture, image, videotape or CD.  The hospital will not be responsible for storage, release or maintenance of the picture, image, tape or CD.    7.   I consent to the presence of a  or observers in the operating room as deemed necessary by my physician or their designees.    8.   I recognize that in the event my procedure results in extended  X-Ray/fluoroscopy time, I may develop a skin reaction.    9. If I have a Do Not Attempt Resuscitation (DNAR) order in place, that status will be suspended while in the operating room, procedural suite, and during the recovery period unless otherwise explicitly stated by me (or a person authorized to consent on my behalf). The surgeon or my attending physician will determine when the applicable recovery period ends for purposes of reinstating the DNAR order.  10. Patients having a sterilization procedure: I understand that if the procedure is successful the results will be permanent and it will therefore be impossible for me to inseminate, conceive, or bear children.  I also understand that the procedure is intended to result in sterility, although the result has not been guaranteed.   11. I acknowledge that my physician has explained sedation/analgesia administration to me including the risk and benefits I consent to the administration of sedation/analgesia as may be necessary or desirable in the judgment of my physician.    I CERTIFY THAT I HAVE READ AND FULLY UNDERSTAND THE ABOVE CONSENT TO OPERATION and/or OTHER PROCEDURE.     _________________________________________ _________________________________     ___________________________________  Signature of Patient     Signature of Responsible Person                   Printed Name of Responsible Person                              _________________________________________ ______________________________        ___________________________________  Signature of Witness         Date  Time         Relationship to Patient    STATEMENT OF PHYSICIAN My signature below affirms that prior to the time of the procedure; I have explained to the patient and/or his/her legal representative, the risks and benefits involved in the proposed treatment and any reasonable alternative to the proposed treatment. I have also explained the risks and benefits involved in refusal of the  proposed treatment and alternatives to the proposed treatment and have answered the patient's questions. If I have a significant financial interest in a co-management agreement or a significant financial interest in any product or implant, or other significant relationship used in this procedure/surgery, I have disclosed this and had a discussion with my patient.     _______________________________________________________________ _____________________________  (Signature of Physician)                                                                                         (Date)                                   (Time)  Patient Name: Vinita Booth    : 6/15/1960   Printed: 3/15/2024      Medical Record #: [id number]                                              Page 1 of 1

## (undated) NOTE — LETTER
[facility] SURGERY SCHEDULING FORM   [address] [facility]   [phone] (scheduling phone) [phone] (scheduling fax)     PATIENT INFORMATION   Last Name:      Michael Rojas      First Name:    EMCOCALISTA Allergies: Patient has no known allergies.          Completed by:   Rebeca Capellan      Date:   2/4/2019

## (undated) NOTE — MR AVS SNAPSHOT
[address]  [phone]  [phone]               Thank you for choosing us for your health care visit with [facility], PT.   We are glad to serve you and happy to provide you w Please arrive at your scheduled appointment time. Wear comfortable, loose fitting clothing.             Jan 31, 2017  1:30 PM   [facility] Visit By Therapist with Franklin Jaffe, [address] [facility] ([address]

## (undated) NOTE — LETTER
[facility] SURGERY SCHEDULING FORM   [address] [facility]   [phone] (scheduling phone) [phone] (scheduling fax)     PATIENT INFORMATION   Patient Name:    Jerod Rocha   :    6/15/1960   G Completed by:    Rashad Taveras      Date:    12/4/2017    [facility] will follow its Pre-Admission Assessment and Screening Policy for pre-admission testing.   Physicians that require   additional testing must order this directly and have results faxed to [facility] a

## (undated) NOTE — LETTER
[address], [facility]   Date:   10/28/2021     Name:   Francisco Chang    YOB: 1960   MRN:   [id number]       WHERE IS YOUR PAIN NOW?   Alexander the areas on your body where you feel the desc

## (undated) NOTE — LETTER
[facility]  Administration of Anesthesia  CHARLA Vinita Booth agree to be cared for by a physician anesthesiologist alone and/or with a nurse anesthetist, who is specially trained to monitor me and give me medicine to put me to sleep or keep me comfortable during my procedure    I understand that my anesthesiologist and/or anesthetist is not an employee or agent of [facility] or [employer]. He or she works for [facility]    As the patient asking for anesthesia services, I agree to:  Allow the anesthesiologist (anesthesia doctor) to give me medicine and do additional procedures as necessary. Some examples are: Starting or using an “IV” to give me medicine, fluids or blood during my procedure, and having a breathing tube placed to help me breathe when I’m asleep (intubation). In the event that my heart stops working properly, I understand that my anesthesiologist will make every effort to sustain my life, unless otherwise directed by [facility] Do Not Resuscitate documents.  Tell my anesthesia doctor before my procedure:  If I am pregnant.  The last time that I ate or drank.  iii. All of the medicines I take (including prescriptions, herbal supplements, and pills I can buy without a prescription (including street drugs/illegal medications). Failure to inform my anesthesiologist about these medicines may increase my risk of anesthetic complications.  iv.If I am allergic to anything or have had a reaction to anesthesia before.  I understand how the anesthesia medicine will help me (benefits).  I understand that with any type of anesthesia medicine there are risks:  The most common risks are: nausea, vomiting, sore throat, muscle soreness, damage to my eyes, mouth, or teeth (from breathing tube placement).  Rare risks include: remembering what happened during my procedure, allergic reactions to medications, injury to my airway, heart, lungs, vision, nerves, or  muscles and in extremely rare instances death.  My doctor has explained to me other choices available to me for my care (alternatives).  Pregnant Patients (“epidural”):  I understand that the risks of having an epidural (medicine given into my back to help control pain during labor), include itching, low blood pressure, difficulty urinating, headache or slowing of the baby’s heart. Very rare risks include infection, bleeding, seizure, irregular heart rhythms and nerve injury.  Regional Anesthesia (“spinal”, “epidural”, & “nerve blocks”):  I understand that rare but potential complications include headache, bleeding, infection, seizure, irregular heart rhythms, and nerve injury.    _____________________________________________________________________________  Patient (or Representative) Signature/Relationship to Patient  Date   Time    _____________________________________________________________________________   Name (if used)    Language/Organization   Time    _____________________________________________________________________________  Nurse Anesthetist Signature     Date   Time  _____________________________________________________________________________  Anesthesiologist Signature     Date   Time  I have discussed the procedure and information above with the patient (or patient’s representative) and answered their questions. The patient or their representative has agreed to have anesthesia services.    _____________________________________________________________________________  Witness        Date   Time  I have verified that the signature is that of the patient or patient’s representative, and that it was signed before the procedure  Patient Name: Vinita Booth     : 6/15/1960                 Printed: 4/3/2025 at 7:15 AM    Medical Record #: [id number]                                            Page 1 of 1  ----------ANESTHESIA CONSENT----------

## (undated) NOTE — LETTER
April 23, 2018         Dnaiele Sunshine, DO  [address]  [facility]      Patient: Nico Cohen   YOB: 1960   Date of Visit: 4/23/2018       Dear Dr. Mar Morris,    I saw your patient, Nico Cohen, on 4/23/2018

## (undated) NOTE — MR AVS SNAPSHOT
[address]  [address] [phone]               Thank you for choosing us for your health care visit with Skippy CottonwoodDO ginger.   We are glad to serve you and happy to provide you with this summary insurance company's guidelines for prior authorization for this test.  You may be held responsible for payment in full if proper authorization is not acquired.   Please contact the Patient Business Office at [phone] if you have any questions related to Support Staff. Remember, [employer] is NOT to be used for urgent needs. For medical emergencies, dial 911. Visit [email or web address] to learn more.            Visit [facility] online at  [email or web address]

## (undated) NOTE — MR AVS SNAPSHOT
[address] [facility] [address] [address]  [phone]  [phone]               Thank you for choosing us for your health care visit with Juana Cole PTA.   We are glad to serve you and happy to provide you with [facility] Visit By Therapist with Dia Burris, [address] [facility] ([facility])    [address]   [phone]           Please arrive at your scheduled appoint

## (undated) NOTE — MR AVS SNAPSHOT
[address] [facility] [address] [address]  [phone]  [phone]               Thank you for choosing us for your health care visit with JULIANNE Martin.   We are glad to serve you and happy to provide you wi Make half your plate fruits and vegetables Highly refined, white starches including white bread, rice and pasta   Eat plenty of protein, keep the fat content low Sugars:  sodas and sports drinks, candies and desserts   Eat plenty of low-fat dairy products

## (undated) NOTE — MR AVS SNAPSHOT
[address] [facility] [address] [address]  [phone]  [phone]               Thank you for choosing us for your health care visit with Gwendolyn Muniz PTA.   We are glad to serve you and happy to provide you with [facility] Visit By Therapist with Pelon Escalante, [address] [facility] ([facility])    [address]   [phone]           Please arrive at your scheduled appoint

## (undated) NOTE — LETTER
[facility]  [address], [place], [place]    Authorization for Surgical Operation and Procedure                               I hereby authorize Darin Carreno MD, my physician and his/her assistants (if applicable), which may include medical students, residents, and/or fellows, to perform the following surgical operation/ procedure and administer such anesthesia as may be determined necessary by my physician: Operation/Procedure name (s) COLONOSCOPY/ ESOPHAGOGASTRODUODENOSCOPY (EGD) on Vinita Booth   2.   I recognize that during the surgical operation/procedure, unforeseen conditions may necessitate additional or different procedures than those listed above.  I, therefore, further authorize and request that the above-named surgeon, assistants, or designees perform such procedures as are, in their judgment, necessary and desirable.    3.   My surgeon/physician has discussed prior to my surgery the potential benefits, risks and side effects of this procedure; the likelihood of achieving goals; and potential problems that might occur during recuperation.  They also discussed reasonable alternatives to the procedure, including risks, benefits, and side effects related to the alternatives and risks related to not receiving this procedure.  I have had all my questions answered and I acknowledge that no guarantee has been made as to the result that may be obtained.    4.   Should the need arise during my operation/procedure, which includes change of level of care prior to discharge, I also consent to the administration of blood and/or blood products.  Further, I understand that despite careful testing and screening of blood or blood products by collecting agencies, I may still be subject to ill effects as a result of receiving a blood transfusion and/or blood products.  The following are some, but not all, of the potential risks that can occur: fever and allergic reactions, hemolytic reactions,  transmission of diseases such as Hepatitis, AIDS and Cytomegalovirus (CMV) and fluid overload.  In the event that I wish to have an autologous transfusion of my own blood, or a directed donor transfusion, I will discuss this with my physician.  Check only if Refusing Blood or Blood Products  I understand refusal of blood or blood products as deemed necessary by my physician may have serious consequences to my condition to include possible death. I hereby assume responsibility for my refusal and release the hospital, its personnel, and my physicians from any responsibility for the consequences of my refusal.    o  Refuse   5.   I authorize the use of any specimen, organs, tissues, body parts or foreign objects that may be removed from my body during the operation/procedure for diagnosis, research or teaching purposes and their subsequent disposal by hospital authorities.  I also authorize the release of specimen test results and/or written reports to my treating physician on the hospital medical staff or other referring or consulting physicians involved in my care, at the discretion of the Pathologist or my treating physician.    6.   I consent to the photographing or videotaping of the operations or procedures to be performed, including appropriate portions of my body for medical, scientific, or educational purposes, provided my identity is not revealed by the pictures or by descriptive texts accompanying them.  If the procedure has been photographed/videotaped, the surgeon will obtain the original picture, image, videotape or CD.  The hospital will not be responsible for storage, release or maintenance of the picture, image, tape or CD.    7.   I consent to the presence of a  or observers in the operating room as deemed necessary by my physician or their designees.    8.   I recognize that in the event my procedure results in extended X-Ray/fluoroscopy time, I may develop a skin reaction.    9. If  I have a Do Not Attempt Resuscitation (DNAR) order in place, that status will be suspended while in the operating room, procedural suite, and during the recovery period unless otherwise explicitly stated by me (or a person authorized to consent on my behalf). The surgeon or my attending physician will determine when the applicable recovery period ends for purposes of reinstating the DNAR order.  10. Patients having a sterilization procedure: I understand that if the procedure is successful the results will be permanent and it will therefore be impossible for me to inseminate, conceive, or bear children.  I also understand that the procedure is intended to result in sterility, although the result has not been guaranteed.   11. I acknowledge that my physician has explained sedation/analgesia administration to me including the risk and benefits I consent to the administration of sedation/analgesia as may be necessary or desirable in the judgment of my physician.    I CERTIFY THAT I HAVE READ AND FULLY UNDERSTAND THE ABOVE CONSENT TO OPERATION and/or OTHER PROCEDURE.     ____________________________________  _________________________________        ______________________________  Signature of Patient    Signature of Responsible Person                Printed Name of Responsible Person                                      ____________________________________  _____________________________                ________________________________  Signature of Witness        Date  Time         Relationship to Patient    STATEMENT OF PHYSICIAN My signature below affirms that prior to the time of the procedure; I have explained to the patient and/or his/her legal representative, the risks and benefits involved in the proposed treatment and any reasonable alternative to the proposed treatment. I have also explained the risks and benefits involved in refusal of the proposed treatment and alternatives to the proposed treatment and have  answered the patient's questions. If I have a significant financial interest in a co-management agreement or a significant financial interest in any product or implant, or other significant relationship used in this procedure/surgery, I have disclosed this and had a discussion with my patient.     _____________________________________________________              _____________________________  (Signature of Physician)                                                                                         (Date)                                   (Time)  Patient Name: Vinita RAQUEL Booth      : 6/15/1960      Printed: 4/3/2025     Medical Record #: [id number]                                      Page 1 of 1

## (undated) NOTE — Clinical Note
4/19/2017          To Whom It May Concern:    Daniele Frias is currently under my medical care and was at the office today. Please excuse Shashihina Jane for arriving late to work. She may return to work on 4/19/2017. Activity is restricted as follows: none. If you require additional information please contact our office.         Sincerely,    Eliza Tuttle, DO

## (undated) NOTE — MR AVS SNAPSHOT
[address]  [place]  [phone]               Thank you for choosing us for your health care visit with Shila Slater PT.   We are glad to serve you and happy to provide you [address] [facility] ([address] [place] [address])    [address] [address]   [phone]           Please arrive at your scheduled appointment time. Wear comfortable, loose fitting clothing.             Jan 26, 201

## (undated) NOTE — LETTER
Riley Schultz may benefit from continued PT to progress her rehab and improve overall mobility. She will need a new O referral for 12 more PT visits to continue her rehab without interruption. See below for my most recent plan of care. Thank you. neutral  Strength is grossly 3+/5 into all planes of L hip motion. Grossly 4/5 throughout L LE  Gait: walks independently without a device. Uses cane for community ambulation or during prolonged walking. Demonstrates slight decrease in push off on L LE.  Go